# Patient Record
Sex: FEMALE | Race: BLACK OR AFRICAN AMERICAN | Employment: FULL TIME | URBAN - METROPOLITAN AREA
[De-identification: names, ages, dates, MRNs, and addresses within clinical notes are randomized per-mention and may not be internally consistent; named-entity substitution may affect disease eponyms.]

---

## 2017-06-15 ENCOUNTER — OFFICE VISIT (OUTPATIENT)
Dept: OBGYN CLINIC | Facility: CLINIC | Age: 27
End: 2017-06-15

## 2017-06-15 VITALS
BODY MASS INDEX: 42.65 KG/M2 | WEIGHT: 249.81 LBS | DIASTOLIC BLOOD PRESSURE: 69 MMHG | HEART RATE: 79 BPM | SYSTOLIC BLOOD PRESSURE: 104 MMHG | HEIGHT: 64 IN

## 2017-06-15 DIAGNOSIS — Z01.419 ENCOUNTER FOR GYNECOLOGICAL EXAMINATION WITHOUT ABNORMAL FINDING: Primary | ICD-10-CM

## 2017-06-15 DIAGNOSIS — N89.8 VAGINAL ITCHING: ICD-10-CM

## 2017-06-15 PROCEDURE — 99395 PREV VISIT EST AGE 18-39: CPT | Performed by: OBSTETRICS & GYNECOLOGY

## 2017-06-15 PROCEDURE — 99212 OFFICE O/P EST SF 10 MIN: CPT | Performed by: OBSTETRICS & GYNECOLOGY

## 2017-06-15 NOTE — PROGRESS NOTES
David Haas is a 32year old female  Patient's last menstrual period was 2017 (exact date).   Patient presents with:  Gyn Exam: annual exam   Gyn Problem: vaginal irritation & vaginal itching x7 days    patient is here today for her annual e Comment 2007  at [address], [address] [facility] [address]; W/BIOPSY(S), CERVIX      Comment colpo - 2013         SOCIAL HISTORY:    Social History   Marital Status: Single  Spouse Name: N/A    Years of Education: N/A  Number of Child discharge, nipple retraction or skin changes  Respiratory:  lungs clear to auscultation bilaterally  Cardiovascular: regular rate and rhythm, no significant murmur  Abdomen:  soft, nontender, nondistended, no masses  Skin/Hair: no unusual rashes or bruises

## 2017-07-12 ENCOUNTER — OFFICE VISIT (OUTPATIENT)
Dept: INTERNAL MEDICINE CLINIC | Facility: CLINIC | Age: 27
End: 2017-07-12

## 2017-07-12 VITALS
RESPIRATION RATE: 17 BRPM | OXYGEN SATURATION: 98 % | TEMPERATURE: 98 F | DIASTOLIC BLOOD PRESSURE: 60 MMHG | HEIGHT: 64 IN | SYSTOLIC BLOOD PRESSURE: 120 MMHG | HEART RATE: 68 BPM | WEIGHT: 148 LBS | BODY MASS INDEX: 25.27 KG/M2

## 2017-07-12 DIAGNOSIS — B34.9 VIREMIA: Primary | ICD-10-CM

## 2017-07-12 DIAGNOSIS — G47.33 OSA (OBSTRUCTIVE SLEEP APNEA): ICD-10-CM

## 2017-07-12 DIAGNOSIS — J06.9 URI, ACUTE: ICD-10-CM

## 2017-07-12 PROBLEM — R60.0 EDEMA OF LOWER EXTREMITY: Status: ACTIVE | Noted: 2017-07-12

## 2017-07-12 PROBLEM — E66.01 MORBID OBESITY (HCC): Status: ACTIVE | Noted: 2017-07-12

## 2017-07-12 PROBLEM — R42 DIZZINESS: Status: ACTIVE | Noted: 2017-07-12

## 2017-07-12 PROCEDURE — 99213 OFFICE O/P EST LOW 20 MIN: CPT | Performed by: INTERNAL MEDICINE

## 2017-07-12 NOTE — PROGRESS NOTES
HPI:    Patient ID: Rey Rayo is a 32year old female. HPI     Patient  Got sick after taking care of son who has Flu like symptoms. She started to have earache, nasal congestion, body ache, poor appetite, nausea.  She had forced herself to go to wo Lymphadenopathy:     She has no cervical adenopathy. Neurological: She is alert and oriented to person, place, and time. Psychiatric: Judgment normal.   Nursing note and vitals reviewed.              ASSESSMENT/PLAN:   Viremia  (primary encounter diag

## 2017-08-16 ENCOUNTER — OFFICE VISIT (OUTPATIENT)
Dept: INTERNAL MEDICINE CLINIC | Facility: CLINIC | Age: 27
End: 2017-08-16

## 2017-08-16 VITALS
OXYGEN SATURATION: 98 % | BODY MASS INDEX: 42.34 KG/M2 | HEART RATE: 90 BPM | TEMPERATURE: 98 F | WEIGHT: 248 LBS | SYSTOLIC BLOOD PRESSURE: 126 MMHG | DIASTOLIC BLOOD PRESSURE: 62 MMHG | RESPIRATION RATE: 16 BRPM | HEIGHT: 64 IN

## 2017-08-16 DIAGNOSIS — G44.209 TENSION HEADACHE: Primary | ICD-10-CM

## 2017-08-16 PROCEDURE — 99213 OFFICE O/P EST LOW 20 MIN: CPT | Performed by: INTERNAL MEDICINE

## 2017-08-16 NOTE — PROGRESS NOTES
HPI:    Patient ID: Rey Rayo is a 32year old female. HPI     Patient has  tension headache and \" migraine'. BP had been normal.She had irregular work schedule. Worked as .  Sleep had been disturbed due to frequent change of work

## 2017-08-17 PROBLEM — R60.0 EDEMA OF LOWER EXTREMITY: Status: RESOLVED | Noted: 2017-07-12 | Resolved: 2017-08-17

## 2017-09-20 ENCOUNTER — OFFICE VISIT (OUTPATIENT)
Dept: OBGYN CLINIC | Facility: CLINIC | Age: 27
End: 2017-09-20

## 2017-09-20 ENCOUNTER — APPOINTMENT (OUTPATIENT)
Dept: LAB | Facility: HOSPITAL | Age: 27
End: 2017-09-20
Attending: OBSTETRICS & GYNECOLOGY
Payer: COMMERCIAL

## 2017-09-20 VITALS
BODY MASS INDEX: 42 KG/M2 | HEART RATE: 88 BPM | SYSTOLIC BLOOD PRESSURE: 119 MMHG | WEIGHT: 243 LBS | DIASTOLIC BLOOD PRESSURE: 76 MMHG

## 2017-09-20 DIAGNOSIS — N76.6 VULVAR ULCERATION: ICD-10-CM

## 2017-09-20 DIAGNOSIS — N76.6 VULVAR ULCERATION: Primary | ICD-10-CM

## 2017-09-20 PROCEDURE — 99213 OFFICE O/P EST LOW 20 MIN: CPT | Performed by: OBSTETRICS & GYNECOLOGY

## 2017-09-20 PROCEDURE — 87389 HIV-1 AG W/HIV-1&-2 AB AG IA: CPT

## 2017-09-20 PROCEDURE — 86780 TREPONEMA PALLIDUM: CPT

## 2017-09-20 PROCEDURE — 36415 COLL VENOUS BLD VENIPUNCTURE: CPT

## 2017-09-20 RX ORDER — ACYCLOVIR 50 MG/G
1 OINTMENT TOPICAL
Qty: 1 TUBE | Refills: 1 | Status: SHIPPED | OUTPATIENT
Start: 2017-09-20 | End: 2017-10-04 | Stop reason: ALTCHOICE

## 2017-09-20 NOTE — PROGRESS NOTES
[address] [facility] 1990       Patient presents with:  Prenatal Care: vaginal irritation/burning  c/o 3 day h/o right inner vulvar irritation. Symptoms worsen when she urinates. She has only 1 partner and does not use condoms.       Past Medical H encouraged, pt informed that I am suspicious of herpes and counseled on treatment and abstinence with outbreaks

## 2017-09-21 ENCOUNTER — TELEPHONE (OUTPATIENT)
Dept: SURGERY | Facility: CLINIC | Age: 27
End: 2017-09-21

## 2017-09-21 LAB
CANDIDA SCREEN: NEGATIVE
GENITAL VAGINOSIS SCREEN: NEGATIVE
HIV1+2 AB SERPL QL IA: NONREACTIVE
TRICHOMONAS SCREEN: NEGATIVE

## 2017-09-21 NOTE — TELEPHONE ENCOUNTER
9/21/17 @ 5:10pm Spoke to pt. She is seeing PCP on 9/25/17 and will ask for 6 visits for Dr. Neo Lara at that time.

## 2017-09-22 ENCOUNTER — TELEPHONE (OUTPATIENT)
Dept: OBGYN CLINIC | Facility: CLINIC | Age: 27
End: 2017-09-22

## 2017-09-22 LAB
C TRACH DNA SPEC QL NAA+PROBE: NEGATIVE
HSV1 DNA SPEC QL NAA+PROBE: NEGATIVE
HSV2 DNA SPEC QL NAA+PROBE: POSITIVE
N GONORRHOEA DNA SPEC QL NAA+PROBE: NEGATIVE
T PALLIDUM AB SER QL: NEGATIVE
T VAGINALIS RRNA SPEC QL NAA+PROBE: NEGATIVE

## 2017-09-22 NOTE — TELEPHONE ENCOUNTER
Pt informed that HIV and vaginal culture were negative/normal. Pt informed that all other labs are still in process and Lor Maria from lab stated that they should be resulted by later this afternoon.  Pt informed she can call back later or tomorrow morning for

## 2017-09-23 ENCOUNTER — TELEPHONE (OUTPATIENT)
Dept: OBGYN CLINIC | Facility: CLINIC | Age: 27
End: 2017-09-23

## 2017-09-23 NOTE — TELEPHONE ENCOUNTER
Pt advised HSV is positive. Pt was counseled by CAP at [facility]. Discussed condom use and to avoid intercourse with outbreaks. Partner to consult his doctor for advice, should be seen if experiences outbreak.  Pt has been using acyclovir and states it is helpin

## 2017-09-25 RX ORDER — VALACYCLOVIR HYDROCHLORIDE 500 MG/1
500 TABLET, FILM COATED ORAL 2 TIMES DAILY
Qty: 6 TABLET | Refills: 0 | Status: SHIPPED | OUTPATIENT
Start: 2017-09-25 | End: 2017-10-04 | Stop reason: ALTCHOICE

## 2017-09-25 NOTE — TELEPHONE ENCOUNTER
Please also send erx for valtrex 500mg po bid x 3 days as well as her using the acyclovir oint that she already has

## 2017-09-25 NOTE — TELEPHONE ENCOUNTER
Pt informed of CAPs recs and verbalized understanding. Valtrex 500mg BID x 3 days sent to pts pharmacy. Pt instructed to call for further outbreaks as well. Pt verbalized understanding.

## 2017-10-04 ENCOUNTER — OFFICE VISIT (OUTPATIENT)
Dept: INTERNAL MEDICINE CLINIC | Facility: CLINIC | Age: 27
End: 2017-10-04

## 2017-10-04 VITALS
TEMPERATURE: 98 F | HEART RATE: 82 BPM | OXYGEN SATURATION: 99 % | SYSTOLIC BLOOD PRESSURE: 118 MMHG | WEIGHT: 246 LBS | BODY MASS INDEX: 42 KG/M2 | DIASTOLIC BLOOD PRESSURE: 62 MMHG | RESPIRATION RATE: 17 BRPM | HEIGHT: 64 IN

## 2017-10-04 DIAGNOSIS — E66.01 MORBID OBESITY (HCC): ICD-10-CM

## 2017-10-04 DIAGNOSIS — Z86.19 H/O HERPES GENITALIS: ICD-10-CM

## 2017-10-04 DIAGNOSIS — G47.33 OSA (OBSTRUCTIVE SLEEP APNEA): ICD-10-CM

## 2017-10-04 DIAGNOSIS — G43.C0 PERIODIC HEADACHE SYNDROME, NOT INTRACTABLE: Primary | ICD-10-CM

## 2017-10-04 PROCEDURE — 99214 OFFICE O/P EST MOD 30 MIN: CPT | Performed by: INTERNAL MEDICINE

## 2017-10-04 RX ORDER — VALACYCLOVIR HYDROCHLORIDE 500 MG/1
500 TABLET, FILM COATED ORAL 2 TIMES DAILY
Qty: 6 TABLET | Refills: 0 | Status: SHIPPED | OUTPATIENT
Start: 2017-10-04 | End: 2017-10-26 | Stop reason: ALTCHOICE

## 2017-10-04 NOTE — PROGRESS NOTES
HPI:    Patient ID: Paula Crespo is a 32year old female. HPI  Patient is here for update of her FMLA form. H/o Migraine and tension headaches. Unable to concentrate and stay focus during exacerbation. Works as .  We have requested e Nursing note and vitals reviewed.              ASSESSMENT/PLAN:   Periodic headache syndrome, not intractable  (primary encounter diagnosis)  Morbid obesity (hcc)  H/o herpes genitalis  Isiah (obstructive sleep apnea)      Periodic headache due to migraine,

## 2017-10-05 ENCOUNTER — LAB ENCOUNTER (OUTPATIENT)
Dept: LAB | Facility: HOSPITAL | Age: 27
End: 2017-10-05
Attending: INTERNAL MEDICINE
Payer: COMMERCIAL

## 2017-10-05 DIAGNOSIS — E66.01 MORBID OBESITY (HCC): ICD-10-CM

## 2017-10-05 PROCEDURE — 82607 VITAMIN B-12: CPT

## 2017-10-05 PROCEDURE — 80061 LIPID PANEL: CPT

## 2017-10-05 PROCEDURE — 84443 ASSAY THYROID STIM HORMONE: CPT

## 2017-10-05 PROCEDURE — 36415 COLL VENOUS BLD VENIPUNCTURE: CPT

## 2017-10-05 PROCEDURE — 80053 COMPREHEN METABOLIC PANEL: CPT

## 2017-10-05 PROCEDURE — 85025 COMPLETE CBC W/AUTO DIFF WBC: CPT

## 2017-10-26 ENCOUNTER — OFFICE VISIT (OUTPATIENT)
Dept: SURGERY | Facility: CLINIC | Age: 27
End: 2017-10-26

## 2017-10-26 ENCOUNTER — LAB ENCOUNTER (OUTPATIENT)
Dept: LAB | Facility: HOSPITAL | Age: 27
End: 2017-10-26
Attending: NURSE PRACTITIONER
Payer: COMMERCIAL

## 2017-10-26 VITALS
BODY MASS INDEX: 41.16 KG/M2 | SYSTOLIC BLOOD PRESSURE: 132 MMHG | DIASTOLIC BLOOD PRESSURE: 77 MMHG | HEIGHT: 64 IN | WEIGHT: 241.13 LBS | OXYGEN SATURATION: 100 % | TEMPERATURE: 97 F | HEART RATE: 92 BPM | RESPIRATION RATE: 17 BRPM

## 2017-10-26 DIAGNOSIS — E66.01 MORBID OBESITY WITH BMI OF 40.0-44.9, ADULT (HCC): ICD-10-CM

## 2017-10-26 DIAGNOSIS — R60.0 LOWER EXTREMITY EDEMA: ICD-10-CM

## 2017-10-26 DIAGNOSIS — G47.33 OSA ON CPAP: Primary | ICD-10-CM

## 2017-10-26 DIAGNOSIS — Z99.89 OSA ON CPAP: Primary | ICD-10-CM

## 2017-10-26 DIAGNOSIS — M17.12 PRIMARY OSTEOARTHRITIS OF LEFT KNEE: ICD-10-CM

## 2017-10-26 DIAGNOSIS — R06.83 SNORING: ICD-10-CM

## 2017-10-26 PROCEDURE — 93010 ELECTROCARDIOGRAM REPORT: CPT | Performed by: INTERNAL MEDICINE

## 2017-10-26 PROCEDURE — 93005 ELECTROCARDIOGRAM TRACING: CPT

## 2017-10-26 PROCEDURE — 99214 OFFICE O/P EST MOD 30 MIN: CPT | Performed by: INTERNAL MEDICINE

## 2017-10-26 RX ORDER — PHENTERMINE HYDROCHLORIDE 15 MG/1
15 CAPSULE ORAL EVERY MORNING
Qty: 30 CAPSULE | Refills: 0 | Status: SHIPPED | OUTPATIENT
Start: 2017-10-26 | End: 2017-11-16

## 2017-10-26 RX ORDER — HYDROCHLOROTHIAZIDE 12.5 MG/1
12.5 CAPSULE, GELATIN COATED ORAL DAILY
Qty: 30 CAPSULE | Refills: 1 | Status: SHIPPED | OUTPATIENT
Start: 2017-10-26 | End: 2017-12-13

## 2017-10-26 NOTE — PROGRESS NOTES
The [facility] Consultation Note       Date of Consult:  10/26/2017    Patient:  Tricia Lopez  :      1990  MRN:      [id number]    Referring Provider: Dr. Cynthia Gardner       Chief Complaint:  Patient presents with:  Consult: pre surgica ev Number of children: N/A     Occupational History  None on file     Social History Main Topics   Smoking status: Never Smoker    Smokeless tobacco: Never Used    Alcohol use Yes  0.0 oz/week     Comment: SOCIALLY    Drug use: No    Sexual activity: Yes 30 minutes to complete each meal      ROS:    Review of Systems   Constitutional: Negative. Negative for activity change, appetite change and fatigue. HENT: Negative. Respiratory: Negative. Negative for cough, shortness of breath and wheezing.     Ca beverages per day. No fruit juices or regular soda. 3. Increase activity-upper body exercises, walk 10 minutes per day. 4. Increase fruit and vegetable servings to 5-6 per day.       OBSTRUCTIVE SLEEP APNEA: The patient states her sleep apnea has been sta

## 2017-10-27 ENCOUNTER — TELEPHONE (OUTPATIENT)
Dept: SURGERY | Facility: CLINIC | Age: 27
End: 2017-10-27

## 2017-10-27 NOTE — TELEPHONE ENCOUNTER
Per the request of KELLI Jackson, I called patient to review HMO bariatric insurance benefits as she is interested in Bariatric Surgery.  Per consent on file, I left detailed message letting her know she did need to meet the 6 month weight management

## 2017-11-08 ENCOUNTER — OFFICE VISIT (OUTPATIENT)
Dept: INTERNAL MEDICINE CLINIC | Facility: CLINIC | Age: 27
End: 2017-11-08

## 2017-11-08 VITALS
HEIGHT: 64 IN | OXYGEN SATURATION: 99 % | HEART RATE: 98 BPM | WEIGHT: 239 LBS | BODY MASS INDEX: 40.8 KG/M2 | SYSTOLIC BLOOD PRESSURE: 118 MMHG | TEMPERATURE: 98 F | DIASTOLIC BLOOD PRESSURE: 70 MMHG | RESPIRATION RATE: 17 BRPM

## 2017-11-08 DIAGNOSIS — E66.01 MORBID OBESITY (HCC): ICD-10-CM

## 2017-11-08 DIAGNOSIS — Z99.89 OSA ON CPAP: ICD-10-CM

## 2017-11-08 DIAGNOSIS — G44.209 TENSION HEADACHE: Primary | ICD-10-CM

## 2017-11-08 DIAGNOSIS — G47.33 OSA ON CPAP: ICD-10-CM

## 2017-11-08 PROCEDURE — 99213 OFFICE O/P EST LOW 20 MIN: CPT | Performed by: INTERNAL MEDICINE

## 2017-11-10 NOTE — PROGRESS NOTES
HPI:    Patient ID: Fredis Beverly is a 32year old female. 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 Due to irregular work schedule, she gets exacerbation of tension headache due to irregular sleep pattern  .  Requesting excuse from tardiness mass. There is no tenderness. Musculoskeletal: She exhibits no edema. Neurological: She is alert and oriented to person, place, and time. She has normal reflexes. No cranial nerve deficit. Coordination normal.   Skin: Skin is warm and dry.    Psychiatri

## 2017-11-16 ENCOUNTER — OFFICE VISIT (OUTPATIENT)
Dept: SURGERY | Facility: CLINIC | Age: 27
End: 2017-11-16

## 2017-11-16 ENCOUNTER — TELEPHONE (OUTPATIENT)
Dept: INTERNAL MEDICINE CLINIC | Facility: CLINIC | Age: 27
End: 2017-11-16

## 2017-11-16 VITALS
RESPIRATION RATE: 18 BRPM | HEART RATE: 89 BPM | OXYGEN SATURATION: 99 % | TEMPERATURE: 97 F | SYSTOLIC BLOOD PRESSURE: 121 MMHG | BODY MASS INDEX: 41.21 KG/M2 | DIASTOLIC BLOOD PRESSURE: 75 MMHG | WEIGHT: 241.38 LBS | HEIGHT: 64 IN

## 2017-11-16 DIAGNOSIS — Z99.89 OSA ON CPAP: Primary | ICD-10-CM

## 2017-11-16 DIAGNOSIS — G47.33 OSA ON CPAP: Primary | ICD-10-CM

## 2017-11-16 DIAGNOSIS — R60.0 LOWER EXTREMITY EDEMA: ICD-10-CM

## 2017-11-16 DIAGNOSIS — E66.01 MORBID OBESITY WITH BMI OF 40.0-44.9, ADULT (HCC): ICD-10-CM

## 2017-11-16 DIAGNOSIS — Z51.81 ENCOUNTER FOR THERAPEUTIC DRUG MONITORING: ICD-10-CM

## 2017-11-16 PROCEDURE — 99214 OFFICE O/P EST MOD 30 MIN: CPT | Performed by: INTERNAL MEDICINE

## 2017-11-16 RX ORDER — PHENTERMINE HYDROCHLORIDE 15 MG/1
15 CAPSULE ORAL EVERY MORNING
Qty: 30 CAPSULE | Refills: 0 | Status: SHIPPED | OUTPATIENT
Start: 2017-11-16 | End: 2017-12-13

## 2017-11-16 NOTE — PROGRESS NOTES
[address], [address] [address]  Dept: [phone]     Date:   2017    Patient:  David Haas  :      1990  MRN:      [id number]    Chief Complaint: Topics   Smoking status: Never Smoker    Smokeless tobacco: Never Used    Alcohol use Yes  0.0 oz/week     Comment: SOCIALLY    Drug use: No    Sexual activity: Yes    Partners: Male    Birth control/ protection: IUD, Paragard     Other Topics Concern    C Constitutional: Negative. Negative for activity change, appetite change, chills and fatigue. HENT: Negative. Respiratory: Negative. Negative for cough, shortness of breath and wheezing. Cardiovascular: Negative.   Negative for chest pain, palpit servings to 5-6 per day.       OBSTRUCTIVE SLEEP APNEA: The patient states her sleep apnea has been stable since the last clinic visit. There has not been any increase in hyper-somnolence.  Doesn't use CPAP every night     MALA: continue HCTZ for MALA    EKG

## 2017-11-27 ENCOUNTER — HOSPITAL ENCOUNTER (EMERGENCY)
Facility: HOSPITAL | Age: 27
Discharge: HOME | End: 2017-11-27
Attending: EMERGENCY MEDICINE
Payer: COMMERCIAL

## 2017-11-27 VITALS
TEMPERATURE: 99 F | OXYGEN SATURATION: 98 % | RESPIRATION RATE: 16 BRPM | SYSTOLIC BLOOD PRESSURE: 126 MMHG | HEART RATE: 85 BPM | DIASTOLIC BLOOD PRESSURE: 73 MMHG

## 2017-11-27 DIAGNOSIS — S41.112A ARM LACERATION, LEFT, INITIAL ENCOUNTER: Primary | ICD-10-CM

## 2017-11-27 PROCEDURE — 12001 RPR S/N/AX/GEN/TRNK 2.5CM/<: CPT

## 2017-11-27 PROCEDURE — 99283 EMERGENCY DEPT VISIT LOW MDM: CPT
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
## 2017-11-30 DIAGNOSIS — E66.01 MORBID OBESITY (HCC): Primary | ICD-10-CM

## 2017-12-06 ENCOUNTER — OFFICE VISIT (OUTPATIENT)
Dept: INTERNAL MEDICINE CLINIC | Facility: CLINIC | Age: 27
End: 2017-12-06

## 2017-12-06 VITALS
RESPIRATION RATE: 17 BRPM | WEIGHT: 242 LBS | BODY MASS INDEX: 41.32 KG/M2 | OXYGEN SATURATION: 99 % | HEIGHT: 64 IN | TEMPERATURE: 98 F | SYSTOLIC BLOOD PRESSURE: 130 MMHG | DIASTOLIC BLOOD PRESSURE: 78 MMHG | HEART RATE: 76 BPM

## 2017-12-06 DIAGNOSIS — T14.8XXA STAB WOUND: Primary | ICD-10-CM

## 2017-12-06 PROCEDURE — 99213 OFFICE O/P EST LOW 20 MIN: CPT | Performed by: INTERNAL MEDICINE
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
## 2017-12-13 ENCOUNTER — OFFICE VISIT (OUTPATIENT)
Dept: SURGERY | Facility: CLINIC | Age: 27
End: 2017-12-13

## 2017-12-13 VITALS
DIASTOLIC BLOOD PRESSURE: 72 MMHG | BODY MASS INDEX: 40.39 KG/M2 | OXYGEN SATURATION: 99 % | SYSTOLIC BLOOD PRESSURE: 113 MMHG | HEART RATE: 78 BPM | RESPIRATION RATE: 16 BRPM | WEIGHT: 236.56 LBS | HEIGHT: 64 IN

## 2017-12-13 DIAGNOSIS — R60.0 LOWER EXTREMITY EDEMA: ICD-10-CM

## 2017-12-13 DIAGNOSIS — E66.01 MORBID OBESITY WITH BMI OF 40.0-44.9, ADULT (HCC): ICD-10-CM

## 2017-12-13 DIAGNOSIS — M17.12 PRIMARY OSTEOARTHRITIS OF LEFT KNEE: ICD-10-CM

## 2017-12-13 DIAGNOSIS — Z99.89 OSA ON CPAP: Primary | ICD-10-CM

## 2017-12-13 DIAGNOSIS — Z51.81 ENCOUNTER FOR THERAPEUTIC DRUG MONITORING: ICD-10-CM

## 2017-12-13 DIAGNOSIS — G47.33 OSA ON CPAP: Primary | ICD-10-CM

## 2017-12-13 PROCEDURE — 99214 OFFICE O/P EST MOD 30 MIN: CPT | Performed by: INTERNAL MEDICINE

## 2017-12-13 RX ORDER — HYDROCHLOROTHIAZIDE 12.5 MG/1
12.5 CAPSULE, GELATIN COATED ORAL DAILY
Qty: 30 CAPSULE | Refills: 1 | Status: SHIPPED | OUTPATIENT
Start: 2017-12-13 | End: 2017-12-27

## 2017-12-13 RX ORDER — PHENTERMINE HYDROCHLORIDE 15 MG/1
15 CAPSULE ORAL EVERY MORNING
Qty: 30 CAPSULE | Refills: 1 | Status: SHIPPED | OUTPATIENT
Start: 2017-12-13 | End: 2018-01-10

## 2017-12-13 NOTE — PROGRESS NOTES
[address], [facility]  [address]  [facility] [address] [facility] [address]  Dept: [phone]     Date:   2017    Patient:  Shanika Mcghee  :      1990  MRN:      [id number]    Chief Complaint: Alcohol use Yes  0.0 oz/week     Comment: SOCIALLY    Drug use: No    Sexual activity: Yes    Partners: Male    Birth control/ protection: IUD, Paragard     Other Topics Concern    Caffeine Concern No    Exercise Yes    Special Diet No    Weight Concern Bird Wheeler Negative. Respiratory: Negative. Negative for cough, shortness of breath and wheezing. Cardiovascular: Negative. Negative for chest pain, palpitations and leg swelling. Gastrointestinal: Negative.   Negative for abdominal distention, abdominal patrice states her sleep apnea has been stable since the last clinic visit. There has not been any increase in hyper-somnolence.  Doesn't use CPAP every night     MALA: continue HCTZ for MALA    EKG done        PHENTERMINE: tolerating well  Will refill at 15 mg     R

## 2017-12-26 ENCOUNTER — TELEPHONE (OUTPATIENT)
Dept: SURGERY | Facility: CLINIC | Age: 27
End: 2017-12-26

## 2017-12-26 NOTE — TELEPHONE ENCOUNTER
Patient would like Hydrochlorothiazide sent to [facility] pharmacy.     [facility] will no longer fill prescriptin

## 2017-12-27 ENCOUNTER — TELEPHONE (OUTPATIENT)
Dept: SURGERY | Facility: CLINIC | Age: 27
End: 2017-12-27

## 2018-01-08 ENCOUNTER — TELEPHONE (OUTPATIENT)
Dept: OBGYN CLINIC | Facility: CLINIC | Age: 28
End: 2018-01-08

## 2018-01-08 NOTE — TELEPHONE ENCOUNTER
Lmtcb. Please assist pt in scheduling with CAP. If she only wants to make an appt this does not need to be sent to triage nurse, thanks.

## 2018-01-10 ENCOUNTER — OFFICE VISIT (OUTPATIENT)
Dept: SURGERY | Facility: CLINIC | Age: 28
End: 2018-01-10

## 2018-01-10 VITALS
BODY MASS INDEX: 40.15 KG/M2 | OXYGEN SATURATION: 98 % | HEIGHT: 64 IN | HEART RATE: 79 BPM | RESPIRATION RATE: 16 BRPM | DIASTOLIC BLOOD PRESSURE: 78 MMHG | SYSTOLIC BLOOD PRESSURE: 120 MMHG | WEIGHT: 235.19 LBS

## 2018-01-10 DIAGNOSIS — R60.0 LOWER EXTREMITY EDEMA: ICD-10-CM

## 2018-01-10 DIAGNOSIS — Z51.81 ENCOUNTER FOR THERAPEUTIC DRUG MONITORING: ICD-10-CM

## 2018-01-10 DIAGNOSIS — G47.33 OSA ON CPAP: Primary | ICD-10-CM

## 2018-01-10 DIAGNOSIS — Z99.89 OSA ON CPAP: Primary | ICD-10-CM

## 2018-01-10 DIAGNOSIS — M17.12 PRIMARY OSTEOARTHRITIS OF LEFT KNEE: ICD-10-CM

## 2018-01-10 DIAGNOSIS — E66.01 MORBID OBESITY WITH BMI OF 40.0-44.9, ADULT (HCC): ICD-10-CM

## 2018-01-10 PROCEDURE — 99214 OFFICE O/P EST MOD 30 MIN: CPT | Performed by: INTERNAL MEDICINE

## 2018-01-10 RX ORDER — PHENTERMINE HYDROCHLORIDE 15 MG/1
15 CAPSULE ORAL EVERY MORNING
Qty: 30 CAPSULE | Refills: 1 | Status: SHIPPED | OUTPATIENT
Start: 2018-01-10 | End: 2018-02-15

## 2018-01-10 NOTE — PROGRESS NOTES
[address] [place] [address]  Dept: [phone]     Date:   2017    Patient:  Chaz Garcia  :      1990  MRN:      [id number]    Chief Complaint: Alcohol use Yes  0.0 oz/week     Comment: SOCIALLY    Drug use: No    Sexual activity: Yes    Partners: Male    Birth control/ protection: IUD, Paragard     Other Topics Concern    Caffeine Concern No    Exercise Yes    Special Diet No    Weight Concern Amandeep Camarena Negative. Respiratory: Negative. Negative for cough, shortness of breath and wheezing. Cardiovascular: Negative. Negative for chest pain, palpitations and leg swelling. Gastrointestinal: Negative.   Negative for abdominal distention, abdominal patrice states her sleep apnea has been stable since the last clinic visit. There has not been any increase in hyper-somnolence.  Doesn't use CPAP every night     MALA: continue HCTZ for MALA    EKG done        PHENTERMINE: tolerating well  Will refill at 15 mg     R

## 2018-01-31 ENCOUNTER — TELEPHONE (OUTPATIENT)
Dept: SURGERY | Facility: CLINIC | Age: 28
End: 2018-01-31

## 2018-01-31 ENCOUNTER — TELEPHONE (OUTPATIENT)
Dept: INTERNAL MEDICINE CLINIC | Facility: CLINIC | Age: 28
End: 2018-01-31

## 2018-01-31 DIAGNOSIS — E66.09 CLASS 2 OBESITY DUE TO EXCESS CALORIES WITHOUT SERIOUS COMORBIDITY WITH BODY MASS INDEX (BMI) OF 39.0 TO 39.9 IN ADULT: Primary | ICD-10-CM

## 2018-02-15 ENCOUNTER — OFFICE VISIT (OUTPATIENT)
Dept: SURGERY | Facility: CLINIC | Age: 28
End: 2018-02-15

## 2018-02-15 VITALS
DIASTOLIC BLOOD PRESSURE: 74 MMHG | HEART RATE: 70 BPM | OXYGEN SATURATION: 97 % | TEMPERATURE: 98 F | RESPIRATION RATE: 18 BRPM | WEIGHT: 237.69 LBS | HEIGHT: 64 IN | BODY MASS INDEX: 40.58 KG/M2 | SYSTOLIC BLOOD PRESSURE: 123 MMHG

## 2018-02-15 DIAGNOSIS — Z51.81 ENCOUNTER FOR THERAPEUTIC DRUG MONITORING: ICD-10-CM

## 2018-02-15 DIAGNOSIS — G47.33 OSA ON CPAP: Primary | ICD-10-CM

## 2018-02-15 DIAGNOSIS — Z99.89 OSA ON CPAP: Primary | ICD-10-CM

## 2018-02-15 DIAGNOSIS — R06.83 SNORING: ICD-10-CM

## 2018-02-15 DIAGNOSIS — R60.0 LOWER EXTREMITY EDEMA: ICD-10-CM

## 2018-02-15 DIAGNOSIS — E66.01 MORBID OBESITY WITH BMI OF 40.0-44.9, ADULT (HCC): ICD-10-CM

## 2018-02-15 PROCEDURE — 99214 OFFICE O/P EST MOD 30 MIN: CPT | Performed by: INTERNAL MEDICINE

## 2018-02-15 RX ORDER — PHENTERMINE HYDROCHLORIDE 15 MG/1
15 CAPSULE ORAL EVERY MORNING
Qty: 30 CAPSULE | Refills: 1 | Status: SHIPPED | OUTPATIENT
Start: 2018-02-15 | End: 2018-03-14

## 2018-02-15 NOTE — PROGRESS NOTES
[address], [facility]  [address] [facility] [address] [facility] [address]  Dept: [phone]     Date:   2017    Patient:  Micaela Rajput  :      1990  MRN:      [id number]    Chief Complaint: History Main Topics   Smoking status: Never Smoker    Smokeless tobacco: Never Used    Alcohol use Yes  0.0 oz/week     Comment: SOCIALLY    Drug use: No    Sexual activity: Yes    Partners: Male    Birth control/ protection: IUD, Paragard     Other Topics Negative. Negative for activity change, appetite change, chills and fatigue. HENT: Negative. Respiratory: Negative. Negative for cough, shortness of breath and wheezing. Cardiovascular: Negative.   Negative for chest pain, palpitations and leg swe 5-6 per day.       OBSTRUCTIVE SLEEP APNEA: The patient states her sleep apnea has been stable since the last clinic visit. There has not been any increase in hyper-somnolence.  Doesn't use CPAP every night     MALA: continue HCTZ for MALA    EKG done

## 2018-02-16 ENCOUNTER — APPOINTMENT (OUTPATIENT)
Dept: GENERAL RADIOLOGY | Age: 28
End: 2018-02-16
Attending: FAMILY MEDICINE
Payer: COMMERCIAL

## 2018-02-16 ENCOUNTER — HOSPITAL ENCOUNTER (OUTPATIENT)
Age: 28
Discharge: HOME | End: 2018-02-16
Attending: FAMILY MEDICINE
Payer: COMMERCIAL

## 2018-02-16 VITALS
HEIGHT: 64 IN | RESPIRATION RATE: 18 BRPM | BODY MASS INDEX: 41.32 KG/M2 | DIASTOLIC BLOOD PRESSURE: 75 MMHG | OXYGEN SATURATION: 100 % | TEMPERATURE: 98 F | HEART RATE: 85 BPM | WEIGHT: 242 LBS | SYSTOLIC BLOOD PRESSURE: 125 MMHG

## 2018-02-16 DIAGNOSIS — S46.912A STRAIN OF LEFT SHOULDER, INITIAL ENCOUNTER: Primary | ICD-10-CM

## 2018-02-16 PROCEDURE — 99213 OFFICE O/P EST LOW 20 MIN: CPT

## 2018-02-16 PROCEDURE — 99214 OFFICE O/P EST MOD 30 MIN: CPT

## 2018-02-16 PROCEDURE — 73030 X-RAY EXAM OF SHOULDER: CPT | Performed by: FAMILY MEDICINE

## 2018-02-16 RX ORDER — IBUPROFEN 800 MG/1
800 TABLET ORAL EVERY 8 HOURS PRN
Qty: 30 TABLET | Refills: 0 | Status: SHIPPED | OUTPATIENT
Start: 2018-02-16 | End: 2018-02-23

## 2018-02-16 RX ORDER — CYCLOBENZAPRINE HCL 5 MG
5 TABLET ORAL 3 TIMES DAILY PRN
Qty: 15 TABLET | Refills: 0 | Status: SHIPPED | OUTPATIENT
Start: 2018-02-16 | End: 2018-03-15 | Stop reason: ALTCHOICE
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
## 2018-02-16 NOTE — ED PROVIDER NOTES
Patient Seen in: [address]    History   Patient presents with:  Musculoskeletal Problem    Stated Complaint: left arm injury    HPI  25-year-old female with a past medical history significant for obesity with hypertensi 18  Temp: 98.2 °F (36.8 °C)  Temp src: Oral  SpO2: 100 %  O2 Device: None (Room air)    Current:/75   Pulse 85   Temp 98.2 °F (36.8 °C) (Oral)   Resp 18   Ht 162.6 cm (5' 4\")   Wt 109.8 kg   LMP 02/08/2018   SpO2 100%   BMI 41.54 kg/m²         Physi swelling.          Dictated by (CST): Petra Camacho MD on 2/16/2018 at 15:39       Approved by (CST): Petra Camacho MD on 2/16/2018 at 15:39               X-rays are negative for any acute fractures or dislocations.   Will treat conservatively with

## 2018-02-21 ENCOUNTER — OFFICE VISIT (OUTPATIENT)
Dept: INTERNAL MEDICINE CLINIC | Facility: CLINIC | Age: 28
End: 2018-02-21

## 2018-02-21 VITALS
HEART RATE: 93 BPM | TEMPERATURE: 98 F | DIASTOLIC BLOOD PRESSURE: 60 MMHG | BODY MASS INDEX: 40.46 KG/M2 | HEIGHT: 64 IN | WEIGHT: 237 LBS | OXYGEN SATURATION: 99 % | SYSTOLIC BLOOD PRESSURE: 132 MMHG | RESPIRATION RATE: 20 BRPM

## 2018-02-21 DIAGNOSIS — J06.9 VIRAL UPPER RESPIRATORY TRACT INFECTION: ICD-10-CM

## 2018-02-21 DIAGNOSIS — S46.912A MUSCLE STRAIN, SHOULDER REGION, LEFT, INITIAL ENCOUNTER: Primary | ICD-10-CM

## 2018-02-21 PROCEDURE — 99213 OFFICE O/P EST LOW 20 MIN: CPT | Performed by: INTERNAL MEDICINE
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
## 2018-02-26 ENCOUNTER — APPOINTMENT (OUTPATIENT)
Dept: PHYSICAL THERAPY | Facility: HOSPITAL | Age: 28
End: 2018-02-26
Attending: INTERNAL MEDICINE
Payer: COMMERCIAL

## 2018-02-28 ENCOUNTER — TELEPHONE (OUTPATIENT)
Dept: INTERNAL MEDICINE CLINIC | Facility: CLINIC | Age: 28
End: 2018-02-28

## 2018-02-28 ENCOUNTER — OFFICE VISIT (OUTPATIENT)
Dept: SURGERY | Facility: CLINIC | Age: 28
End: 2018-02-28

## 2018-02-28 VITALS — HEIGHT: 64 IN | WEIGHT: 237.5 LBS | BODY MASS INDEX: 40.55 KG/M2

## 2018-02-28 DIAGNOSIS — E66.01 MORBID OBESITY WITH BMI OF 40.0-44.9, ADULT (HCC): Primary | ICD-10-CM

## 2018-02-28 PROCEDURE — 97803 MED NUTRITION INDIV SUBSEQ: CPT | Performed by: DIETITIAN, REGISTERED

## 2018-02-28 NOTE — PROGRESS NOTES
[address] [place] [address] AND WEIGHT LOSS CLINIC  [address] [place] [address]  Dept: [phone]  Loc: [phone]    02/28/18    Bariatric Initial Nutrition Assessment    Chela Souza is a 25year old female.     Referr support from: Family, mother is primary support system    Patient acknowledges binge eating behavior: Eats until uncomfortably full. and Eats large amounts of food when not physically hungry.     Patient engages in binge-purge behavior: no    Patient uses l Results  Component Value Date   B12 1,057 (H) 10/05/2017     No results found for: VITD, QVITD, VITD25, TDRZ34CU  No results found for: THIAMINE   No results found for: VITB1  No results found for: FOLIC    Relevant Meds:      Current Outpatient Prescripti protein sources; discussed sustainability of adopting lifestyle behavior change  vs. quick fix diet for susutainable weight loss. Provided 1200 david menu and discussed goal of preparing more foods at home.  Patient with little activity while not currently w quiz  Patient ready for Liquid Protein Education?  No    Shasta Mack, MS, RD, LDN  Face-to-face time spent with pt: 90 minutes

## 2018-02-28 NOTE — TELEPHONE ENCOUNTER
Pt needs a Work status report for her job. It should state:  Return with restrictions or total off from duties. / Pt made an appt for tomorrow.

## 2018-03-01 ENCOUNTER — OFFICE VISIT (OUTPATIENT)
Dept: INTERNAL MEDICINE CLINIC | Facility: CLINIC | Age: 28
End: 2018-03-01

## 2018-03-01 ENCOUNTER — OFFICE VISIT (OUTPATIENT)
Dept: PHYSICAL THERAPY | Facility: HOSPITAL | Age: 28
End: 2018-03-01
Attending: INTERNAL MEDICINE
Payer: COMMERCIAL

## 2018-03-01 VITALS
RESPIRATION RATE: 17 BRPM | OXYGEN SATURATION: 99 % | SYSTOLIC BLOOD PRESSURE: 110 MMHG | WEIGHT: 237 LBS | BODY MASS INDEX: 40.46 KG/M2 | TEMPERATURE: 98 F | HEART RATE: 110 BPM | DIASTOLIC BLOOD PRESSURE: 60 MMHG | HEIGHT: 64 IN

## 2018-03-01 DIAGNOSIS — S46.912A MUSCLE STRAIN, SHOULDER REGION, LEFT, INITIAL ENCOUNTER: ICD-10-CM

## 2018-03-01 DIAGNOSIS — S46.912S: Primary | ICD-10-CM

## 2018-03-01 DIAGNOSIS — L98.9 FACIAL SKIN LESION: ICD-10-CM

## 2018-03-01 PROCEDURE — 99213 OFFICE O/P EST LOW 20 MIN: CPT | Performed by: INTERNAL MEDICINE

## 2018-03-01 PROCEDURE — 97110 THERAPEUTIC EXERCISES: CPT

## 2018-03-01 PROCEDURE — 97161 PT EVAL LOW COMPLEX 20 MIN: CPT

## 2018-03-01 NOTE — PROGRESS NOTES
PT EVALUATION:   Referring Physician: Devan Pardo MD  Date of Onset:  2/16/2018 Date of Service: 3/1/2018   Diagnosis: L shoulder pain   SUBJECTIVE:   Ck Yanez is a 25year old y/o female who presents to therapy today with complaints of L should the above impairments to improve ROM and reduce pain in order to facilitate full return to work and achieve below set functional goals.      Precautions: None       OBJECTIVE:   Observation/Posture: Poor, slumped posture    Cervical AROM:  Pain (+/-)   Flex demonstrate ability to reach for an item off a shelf above eye level without pain. Frequency/Duration: Patient will be seen for 2x/week or a total of 8 visits over a 90 day period.  Treatment will include: Manual Therapy, Neuromuscular Re-education, The
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
## 2018-03-06 ENCOUNTER — OFFICE VISIT (OUTPATIENT)
Dept: PHYSICAL THERAPY | Facility: HOSPITAL | Age: 28
End: 2018-03-06
Attending: INTERNAL MEDICINE
Payer: COMMERCIAL

## 2018-03-06 DIAGNOSIS — S46.912A MUSCLE STRAIN, SHOULDER REGION, LEFT, INITIAL ENCOUNTER: ICD-10-CM

## 2018-03-06 PROCEDURE — 97140 MANUAL THERAPY 1/> REGIONS: CPT

## 2018-03-06 PROCEDURE — 97110 THERAPEUTIC EXERCISES: CPT

## 2018-03-06 NOTE — PROGRESS NOTES
Diagnosis: L shoulder pain  Authorized # of Visits:  2/8 [facility])         Next MD visit: 3/15/18  Fall Risk: standard         Precautions: n/a           Medication Changes since last visit?: No  Subjective: Pt states that her shoulder seems to be getting bett

## 2018-03-08 ENCOUNTER — OFFICE VISIT (OUTPATIENT)
Dept: PHYSICAL THERAPY | Facility: HOSPITAL | Age: 28
End: 2018-03-08
Attending: INTERNAL MEDICINE
Payer: COMMERCIAL

## 2018-03-08 DIAGNOSIS — S46.912A MUSCLE STRAIN, SHOULDER REGION, LEFT, INITIAL ENCOUNTER: ICD-10-CM

## 2018-03-08 PROCEDURE — 97110 THERAPEUTIC EXERCISES: CPT

## 2018-03-08 NOTE — PROGRESS NOTES
Diagnosis: L shoulder pain  Authorized # of Visits:  3/8 [facility])         Next MD visit: 3/15/18  Fall Risk: standard         Precautions: n/a           Medication Changes since last visit?: No  Subjective: Pt states that her shoulder is good and denies pain.

## 2018-03-13 ENCOUNTER — OFFICE VISIT (OUTPATIENT)
Dept: PHYSICAL THERAPY | Facility: HOSPITAL | Age: 28
End: 2018-03-13
Attending: INTERNAL MEDICINE
Payer: COMMERCIAL

## 2018-03-13 DIAGNOSIS — S46.912A MUSCLE STRAIN, SHOULDER REGION, LEFT, INITIAL ENCOUNTER: ICD-10-CM

## 2018-03-13 PROCEDURE — 97110 THERAPEUTIC EXERCISES: CPT

## 2018-03-13 NOTE — PROGRESS NOTES
Diagnosis: L shoulder pain  Authorized # of Visits:  4/8 [facility])         Next MD visit: 3/15/18  Fall Risk: standard         Precautions: n/a           Medication Changes since last visit?: No  Subjective: Pt states that her shoulder is feeling good and donna

## 2018-03-14 ENCOUNTER — OFFICE VISIT (OUTPATIENT)
Dept: SURGERY | Facility: CLINIC | Age: 28
End: 2018-03-14

## 2018-03-14 VITALS
BODY MASS INDEX: 39.95 KG/M2 | HEIGHT: 64 IN | HEART RATE: 92 BPM | SYSTOLIC BLOOD PRESSURE: 135 MMHG | OXYGEN SATURATION: 98 % | RESPIRATION RATE: 16 BRPM | DIASTOLIC BLOOD PRESSURE: 81 MMHG | WEIGHT: 234 LBS

## 2018-03-14 DIAGNOSIS — G47.33 OSA ON CPAP: Primary | ICD-10-CM

## 2018-03-14 DIAGNOSIS — M17.12 PRIMARY OSTEOARTHRITIS OF LEFT KNEE: ICD-10-CM

## 2018-03-14 DIAGNOSIS — Z51.81 ENCOUNTER FOR THERAPEUTIC DRUG MONITORING: ICD-10-CM

## 2018-03-14 DIAGNOSIS — Z99.89 OSA ON CPAP: Primary | ICD-10-CM

## 2018-03-14 DIAGNOSIS — R60.0 LOWER EXTREMITY EDEMA: ICD-10-CM

## 2018-03-14 DIAGNOSIS — E66.01 MORBID OBESITY WITH BMI OF 40.0-44.9, ADULT (HCC): ICD-10-CM

## 2018-03-14 PROCEDURE — 99214 OFFICE O/P EST MOD 30 MIN: CPT | Performed by: INTERNAL MEDICINE

## 2018-03-14 RX ORDER — HYDROCHLOROTHIAZIDE 12.5 MG/1
12.5 CAPSULE, GELATIN COATED ORAL DAILY
Qty: 30 CAPSULE | Refills: 1 | Status: SHIPPED | OUTPATIENT
Start: 2018-03-14 | End: 2018-03-14

## 2018-03-14 RX ORDER — HYDROCHLOROTHIAZIDE 12.5 MG/1
12.5 CAPSULE, GELATIN COATED ORAL DAILY
Qty: 90 CAPSULE | Refills: 0 | Status: SHIPPED | OUTPATIENT
Start: 2018-03-14 | End: 2018-03-26

## 2018-03-14 RX ORDER — PHENTERMINE HYDROCHLORIDE 15 MG/1
15 CAPSULE ORAL EVERY MORNING
Qty: 30 CAPSULE | Refills: 1 | Status: SHIPPED | OUTPATIENT
Start: 2018-03-14 | End: 2018-05-09

## 2018-03-14 NOTE — PROGRESS NOTES
[address], [facility]  [address] [facility] [address] [facility] [address]  Dept: [phone]     Date:   2017    Patient:  Tiago Moody  :      1990  MRN:      [id number]    Chief Complaint: History  None on file     Social History Main Topics   Smoking status: Never Smoker    Smokeless tobacco: Never Used    Alcohol use Yes  0.0 oz/week     Comment: SOCIALLY    Drug use: No    Sexual activity: Yes    Partners: Male    Birth control/ protectio Review of Systems   Constitutional: Negative. Negative for activity change, appetite change, chills and fatigue. HENT: Negative. Respiratory: Negative. Negative for cough, shortness of breath and wheezing. Cardiovascular: Negative.   Negative fo 10 minutes per day. 4. Increase fruit and vegetable servings to 5-6 per day.       OBSTRUCTIVE SLEEP APNEA: The patient states her sleep apnea has been stable since the last clinic visit. There has not been any increase in hyper-somnolence.  Doesn't use CP

## 2018-03-15 ENCOUNTER — OFFICE VISIT (OUTPATIENT)
Dept: PHYSICAL THERAPY | Facility: HOSPITAL | Age: 28
End: 2018-03-15
Attending: INTERNAL MEDICINE
Payer: COMMERCIAL

## 2018-03-15 ENCOUNTER — OFFICE VISIT (OUTPATIENT)
Dept: INTERNAL MEDICINE CLINIC | Facility: CLINIC | Age: 28
End: 2018-03-15

## 2018-03-15 VITALS
TEMPERATURE: 98 F | SYSTOLIC BLOOD PRESSURE: 120 MMHG | DIASTOLIC BLOOD PRESSURE: 60 MMHG | WEIGHT: 235.63 LBS | HEIGHT: 64 IN | RESPIRATION RATE: 17 BRPM | BODY MASS INDEX: 40.23 KG/M2 | HEART RATE: 70 BPM | OXYGEN SATURATION: 96 %

## 2018-03-15 DIAGNOSIS — E66.01 MORBID OBESITY (HCC): Primary | ICD-10-CM

## 2018-03-15 DIAGNOSIS — S46.912A MUSCLE STRAIN, SHOULDER REGION, LEFT, INITIAL ENCOUNTER: ICD-10-CM

## 2018-03-15 DIAGNOSIS — S46.912S: Primary | ICD-10-CM

## 2018-03-15 PROCEDURE — 97140 MANUAL THERAPY 1/> REGIONS: CPT

## 2018-03-15 PROCEDURE — 97110 THERAPEUTIC EXERCISES: CPT

## 2018-03-15 PROCEDURE — 99213 OFFICE O/P EST LOW 20 MIN: CPT | Performed by: INTERNAL MEDICINE

## 2018-03-15 NOTE — PROGRESS NOTES
HPI:    Patient ID: Fredis Beverly is a 25year old female. HPI  Patient is here for f/u of left  Shoulder muscular strain. She is feeling verall better. No longer have  pain. Able to abduct fully. No difficulty fastening her bra, or racking her back poc ASSESSMENT/PLAN:   Muscle strain, shoulder region, left, sequela  (primary encounter diagnosis)    Very much improved. No longer has pain with DROM. May return to work with full duty on 3/23/2018. Continue home exercise.

## 2018-03-15 NOTE — PROGRESS NOTES
Diagnosis: L shoulder pain  Authorized # of Visits:  5/8 [facility])         Next MD visit: 3/15/18  Fall Risk: standard         Precautions: n/a           Medication Changes since last visit?: No  Subjective: Pt states that she feels good and is not having any

## 2018-03-20 ENCOUNTER — OFFICE VISIT (OUTPATIENT)
Dept: PHYSICAL THERAPY | Facility: HOSPITAL | Age: 28
End: 2018-03-20
Attending: INTERNAL MEDICINE
Payer: COMMERCIAL

## 2018-03-20 DIAGNOSIS — S46.912A MUSCLE STRAIN, SHOULDER REGION, LEFT, INITIAL ENCOUNTER: ICD-10-CM

## 2018-03-20 PROCEDURE — 97110 THERAPEUTIC EXERCISES: CPT

## 2018-03-20 NOTE — PROGRESS NOTES
Diagnosis: L shoulder pain  Authorized # of Visits:  6/8 [facility])         Next MD visit: 3/15/18  Fall Risk: standard         Precautions: n/a           Medication Changes since last visit?: No  Subjective: Pt states that her shoulder feels good.   Pt states t for pain control. Charges:  TherEx 3      Total Timed Treatment: 45 min  Total Treatment Time: 48 min

## 2018-03-22 ENCOUNTER — APPOINTMENT (OUTPATIENT)
Dept: PHYSICAL THERAPY | Facility: HOSPITAL | Age: 28
End: 2018-03-22
Attending: INTERNAL MEDICINE
Payer: COMMERCIAL

## 2018-03-22 ENCOUNTER — TELEPHONE (OUTPATIENT)
Dept: PHYSICAL THERAPY | Facility: HOSPITAL | Age: 28
End: 2018-03-22

## 2018-03-22 NOTE — PROGRESS NOTES
Patient Name: Mami Cherry, : 1990, MRN: [id number]   Date:  3/22/2018  Referring Physician: Vane Fernandez    Diagnosis:  L shoulder pain    Discharge Summary    Pt has attended 6, cancelled 1, and no shown 0 visits in Physical Therapy.      Pr

## 2018-03-23 ENCOUNTER — TELEPHONE (OUTPATIENT)
Dept: INTERNAL MEDICINE CLINIC | Facility: CLINIC | Age: 28
End: 2018-03-23

## 2018-03-23 DIAGNOSIS — E66.01 MORBID OBESITY WITH BMI OF 40.0-44.9, ADULT (HCC): Primary | ICD-10-CM

## 2018-03-23 DIAGNOSIS — E66.01 MORBID OBESITY (HCC): Primary | ICD-10-CM

## 2018-03-23 NOTE — TELEPHONE ENCOUNTER
Pt called needs new referral to Dr. Luis [facility]).  Referral needs to have new Drs name

## 2018-03-26 DIAGNOSIS — R60.0 LOWER EXTREMITY EDEMA: ICD-10-CM

## 2018-03-26 RX ORDER — HYDROCHLOROTHIAZIDE 12.5 MG/1
CAPSULE, GELATIN COATED ORAL
Qty: 90 CAPSULE | Refills: 0 | Status: SHIPPED | OUTPATIENT
Start: 2018-03-26 | End: 2018-05-09

## 2018-03-28 ENCOUNTER — TELEPHONE (OUTPATIENT)
Dept: SURGERY | Facility: CLINIC | Age: 28
End: 2018-03-28

## 2018-03-28 ENCOUNTER — OFFICE VISIT (OUTPATIENT)
Dept: SURGERY | Facility: CLINIC | Age: 28
End: 2018-03-28

## 2018-03-28 VITALS — HEIGHT: 64 IN | WEIGHT: 234 LBS | BODY MASS INDEX: 39.95 KG/M2

## 2018-03-28 VITALS — HEIGHT: 64 IN | BODY MASS INDEX: 39.94 KG/M2 | WEIGHT: 233.94 LBS

## 2018-03-28 DIAGNOSIS — E66.01 MORBID OBESITY WITH BMI OF 40.0-44.9, ADULT (HCC): ICD-10-CM

## 2018-03-28 DIAGNOSIS — R60.0 LOWER EXTREMITY EDEMA: ICD-10-CM

## 2018-03-28 DIAGNOSIS — E55.9 VITAMIN D DEFICIENCY: ICD-10-CM

## 2018-03-28 DIAGNOSIS — G47.33 OSA ON CPAP: Primary | ICD-10-CM

## 2018-03-28 DIAGNOSIS — M17.12 PRIMARY OSTEOARTHRITIS OF LEFT KNEE: ICD-10-CM

## 2018-03-28 DIAGNOSIS — Z99.89 OSA ON CPAP: Primary | ICD-10-CM

## 2018-03-28 DIAGNOSIS — R06.83 SNORING: ICD-10-CM

## 2018-03-28 DIAGNOSIS — Z51.81 ENCOUNTER FOR THERAPEUTIC DRUG MONITORING: ICD-10-CM

## 2018-03-28 DIAGNOSIS — E66.01 MORBID OBESITY WITH BMI OF 40.0-44.9, ADULT (HCC): Primary | ICD-10-CM

## 2018-03-28 PROCEDURE — 0358T BIA WHOLE BODY: CPT | Performed by: DIETITIAN, REGISTERED

## 2018-03-28 PROCEDURE — 97803 MED NUTRITION INDIV SUBSEQ: CPT | Performed by: DIETITIAN, REGISTERED

## 2018-03-28 NOTE — TELEPHONE ENCOUNTER
Oriented patient to the bariatric program. Provided/reviewed bariatric information packet, referrals, timeline, etc. Pt with good understanding. Pt attended the seminar on November 13, 2017.

## 2018-03-28 NOTE — H&P
[address], [address] [address]  Dept: [phone]    3/28/2018  Bariatric New Patient Evaluation    Chief Complaint:  Morbid obesity     History of Present Illness: Number of children:               Social History Main Topics    Smoking status: Never Smoker                                                                Smokeless tobacco: Never Used                        Alcohol use: Yes           0.0 oz/week female with chronic morbid obesity who would benefit from significant and sustained weight loss    Plan: The patient appears to be an excellent candidate for bariatric surgery.   She has an HMO so we will request the patient be referred for routine pre-oper

## 2018-03-28 NOTE — PROGRESS NOTES
[address] [facility]  [address] [place] [address]  Dept: [phone]  Loc: [phone]    03/28/18      Bariatric Follow-up Nutrition Session    Salvador Martinez is a 25year old female.      [facility] Prescriptions:   •  HYDROCHLOROTHIAZIDE 12.5 MG Oral Cap, TAKE ONE CAPSULE BY MOUTH EVERY DAY, Disp: 90 capsule, Rfl: 0  •  Phentermine HCl 15 MG Oral Cap, Take 1 capsule (15 mg total) by mouth every morning., Disp: 30 capsule, Rfl: 1    Height: 1041-1565 for tolerable wt loss. Per food record, excess calorie intake from refined carbs from cereal and EtOH. Discussed ways to improve diet quality adding fresh f&v in addition to calorie reduction.  Would benefit with additional RD visit prior to [facility]

## 2018-03-29 ENCOUNTER — TELEPHONE (OUTPATIENT)
Dept: OBGYN CLINIC | Facility: CLINIC | Age: 28
End: 2018-03-29

## 2018-03-29 ENCOUNTER — OFFICE VISIT (OUTPATIENT)
Dept: INTERNAL MEDICINE CLINIC | Facility: CLINIC | Age: 28
End: 2018-03-29

## 2018-03-29 ENCOUNTER — OFFICE VISIT (OUTPATIENT)
Dept: OBGYN CLINIC | Facility: CLINIC | Age: 28
End: 2018-03-29

## 2018-03-29 VITALS
BODY MASS INDEX: 39.78 KG/M2 | OXYGEN SATURATION: 100 % | HEIGHT: 64 IN | TEMPERATURE: 98 F | DIASTOLIC BLOOD PRESSURE: 70 MMHG | SYSTOLIC BLOOD PRESSURE: 116 MMHG | RESPIRATION RATE: 19 BRPM | HEART RATE: 95 BPM | WEIGHT: 233 LBS

## 2018-03-29 VITALS
SYSTOLIC BLOOD PRESSURE: 114 MMHG | DIASTOLIC BLOOD PRESSURE: 78 MMHG | WEIGHT: 232 LBS | BODY MASS INDEX: 40 KG/M2 | HEART RATE: 88 BPM

## 2018-03-29 DIAGNOSIS — G44.209 TENSION HEADACHE: Primary | ICD-10-CM

## 2018-03-29 DIAGNOSIS — G47.33 OSA ON CPAP: ICD-10-CM

## 2018-03-29 DIAGNOSIS — L70.0 CLOSED FOLLICLE COMEDO: Primary | ICD-10-CM

## 2018-03-29 DIAGNOSIS — E66.01 MORBID OBESITY (HCC): ICD-10-CM

## 2018-03-29 DIAGNOSIS — Z99.89 OSA ON CPAP: ICD-10-CM

## 2018-03-29 PROCEDURE — 99213 OFFICE O/P EST LOW 20 MIN: CPT | Performed by: INTERNAL MEDICINE

## 2018-03-29 PROCEDURE — 99213 OFFICE O/P EST LOW 20 MIN: CPT | Performed by: OBSTETRICS & GYNECOLOGY

## 2018-03-29 NOTE — PROGRESS NOTES
HPI:    Patient ID: Shanika Mcghee is a 25year old female. HPI   Patient is here to update FMLA. History of frequent tension headache due to poor sleep pattern. Work schedule irregular. Mixed of day and night shift. She also has DANIA requiring CPAP.  Boone Garcia Musculoskeletal: She exhibits no edema. Lymphadenopathy:     She has no cervical adenopathy. Neurological: She is alert and oriented to person, place, and time. She has normal reflexes. Skin: No erythema.    Psychiatric: Judgment normal.   Vitals re

## 2018-03-30 NOTE — TELEPHONE ENCOUNTER
I think this was sent in error as the pt is not pregnant. The pt seen after her yesterday was a PN, and notes indicate she forgot to bring her log. Will f/u with that pt.

## 2018-04-09 NOTE — H&P
HPI:  The patient is a 1year-old female complaining of palpable labial bump for 1 month. Painful. No changes in size. No drainage. Patient has history of herpes and is concerned that she had a herpetic outbreak and wanted to be examined.         Revi HYDROCHLOROTHIAZIDE 12.5 MG Oral Cap, TAKE ONE CAPSULE BY MOUTH EVERY DAY, Disp: 90 capsule, Rfl: 0  •  Phentermine HCl 15 MG Oral Cap, Take 1 capsule (15 mg total) by mouth every morning., Disp: 30 capsule, Rfl: 1    ALLERGIES:    Betadine [Povidone *

## 2018-04-11 ENCOUNTER — TELEPHONE (OUTPATIENT)
Dept: SURGERY | Facility: CLINIC | Age: 28
End: 2018-04-11

## 2018-04-18 ENCOUNTER — APPOINTMENT (OUTPATIENT)
Dept: LAB | Facility: HOSPITAL | Age: 28
End: 2018-04-18
Attending: NURSE PRACTITIONER
Payer: COMMERCIAL

## 2018-04-18 DIAGNOSIS — R60.0 LOWER EXTREMITY EDEMA: ICD-10-CM

## 2018-04-18 DIAGNOSIS — E55.9 VITAMIN D DEFICIENCY: ICD-10-CM

## 2018-04-18 DIAGNOSIS — E66.01 MORBID OBESITY WITH BMI OF 40.0-44.9, ADULT (HCC): ICD-10-CM

## 2018-04-18 DIAGNOSIS — Z51.81 ENCOUNTER FOR THERAPEUTIC DRUG MONITORING: ICD-10-CM

## 2018-04-18 DIAGNOSIS — E66.01 MORBID OBESITY (HCC): ICD-10-CM

## 2018-04-18 DIAGNOSIS — M17.12 PRIMARY OSTEOARTHRITIS OF LEFT KNEE: ICD-10-CM

## 2018-04-18 DIAGNOSIS — Z99.89 OSA ON CPAP: ICD-10-CM

## 2018-04-18 DIAGNOSIS — R06.83 SNORING: ICD-10-CM

## 2018-04-18 DIAGNOSIS — G47.33 OSA ON CPAP: ICD-10-CM

## 2018-04-18 PROCEDURE — 84466 ASSAY OF TRANSFERRIN: CPT

## 2018-04-18 PROCEDURE — 82746 ASSAY OF FOLIC ACID SERUM: CPT

## 2018-04-18 PROCEDURE — 83735 ASSAY OF MAGNESIUM: CPT

## 2018-04-18 PROCEDURE — 83540 ASSAY OF IRON: CPT

## 2018-04-18 PROCEDURE — 84100 ASSAY OF PHOSPHORUS: CPT

## 2018-04-18 PROCEDURE — 82728 ASSAY OF FERRITIN: CPT

## 2018-04-18 PROCEDURE — 83036 HEMOGLOBIN GLYCOSYLATED A1C: CPT

## 2018-04-18 PROCEDURE — 36415 COLL VENOUS BLD VENIPUNCTURE: CPT

## 2018-04-18 PROCEDURE — 82306 VITAMIN D 25 HYDROXY: CPT

## 2018-04-18 PROCEDURE — 84425 ASSAY OF VITAMIN B-1: CPT

## 2018-04-23 ENCOUNTER — TELEPHONE (OUTPATIENT)
Dept: SURGERY | Facility: CLINIC | Age: 28
End: 2018-04-23

## 2018-04-23 NOTE — TELEPHONE ENCOUNTER
Called patient and left message for her to call me back in regards to her results. Patient has a hmo and needs to get a referral to get her thiamine injection.

## 2018-04-23 NOTE — TELEPHONE ENCOUNTER
Dr Jackson Vaughn texted Kimberly Villegas to let her know patient Reji Sierra is low on her thiamine. Per Dr Jackson Vaughn call patient and scheduled patient for a B1 injection.

## 2018-04-25 ENCOUNTER — OFFICE VISIT (OUTPATIENT)
Dept: DERMATOLOGY CLINIC | Facility: CLINIC | Age: 28
End: 2018-04-25

## 2018-04-25 DIAGNOSIS — L70.0 ACNE VULGARIS: Primary | ICD-10-CM

## 2018-04-25 PROCEDURE — 99202 OFFICE O/P NEW SF 15 MIN: CPT | Performed by: DERMATOLOGY

## 2018-04-25 PROCEDURE — 99212 OFFICE O/P EST SF 10 MIN: CPT | Performed by: DERMATOLOGY

## 2018-04-25 RX ORDER — DOXYCYCLINE HYCLATE 100 MG/1
100 CAPSULE ORAL 2 TIMES DAILY
Qty: 60 CAPSULE | Refills: 6 | Status: SHIPPED | OUTPATIENT
Start: 2018-04-25 | End: 2018-05-25

## 2018-04-25 RX ORDER — METRONIDAZOLE 7.5 MG/G
GEL TOPICAL
Qty: 45 G | Refills: 1 | Status: SHIPPED | OUTPATIENT
Start: 2018-04-25 | End: 2018-05-09 | Stop reason: SINTOL

## 2018-04-25 NOTE — TELEPHONE ENCOUNTER
Called and spoke with [place]. Explained that her thiamine level is low and she is need of B1 injection monthly. Patient is hmo and will contact her Md for a referral to have them done at our office.   I asked her to please call us and let us know if she can

## 2018-04-26 ENCOUNTER — OFFICE VISIT (OUTPATIENT)
Dept: PULMONOLOGY | Facility: CLINIC | Age: 28
End: 2018-04-26

## 2018-04-26 VITALS
HEART RATE: 91 BPM | SYSTOLIC BLOOD PRESSURE: 106 MMHG | RESPIRATION RATE: 18 BRPM | BODY MASS INDEX: 39.55 KG/M2 | HEIGHT: 64 IN | WEIGHT: 231.63 LBS | DIASTOLIC BLOOD PRESSURE: 67 MMHG | OXYGEN SATURATION: 99 %

## 2018-04-26 DIAGNOSIS — Z01.818 PREOPERATIVE CLEARANCE: Primary | ICD-10-CM

## 2018-04-26 DIAGNOSIS — G47.33 OSA (OBSTRUCTIVE SLEEP APNEA): ICD-10-CM

## 2018-04-26 DIAGNOSIS — E51.9 THIAMIN DEFICIENCY: Primary | ICD-10-CM

## 2018-04-26 PROCEDURE — 99212 OFFICE O/P EST SF 10 MIN: CPT | Performed by: INTERNAL MEDICINE

## 2018-04-26 PROCEDURE — 99244 OFF/OP CNSLTJ NEW/EST MOD 40: CPT | Performed by: INTERNAL MEDICINE

## 2018-04-26 RX ORDER — THIAMINE HYDROCHLORIDE 100 MG/ML
100 INJECTION, SOLUTION INTRAMUSCULAR; INTRAVENOUS DAILY
Qty: 1 VIAL | Refills: 3 | Status: SHIPPED | OUTPATIENT
Start: 2018-04-26 | End: 2018-09-19

## 2018-04-26 NOTE — H&P
Referring Physician  Jadon Gonzáles MD    Chief Complaint  Preoperative pulmonary clearance    History of Present Illness  It is a 26-year-old -American female who presents today for preoperative pulmonary clearance for upcoming bariatric weight loss No current facility-administered medications on file prior to visit.      Allergies    Betadine [Povidone *    Rash    Physical Exam  Constitutional: no acute distress  HEENT: PERRL  Cardio: RRR, S1 S2  Respiratory: clear to auscultation bilaterally, no

## 2018-04-27 ENCOUNTER — TELEPHONE (OUTPATIENT)
Dept: INTERNAL MEDICINE CLINIC | Facility: CLINIC | Age: 28
End: 2018-04-27

## 2018-04-27 NOTE — TELEPHONE ENCOUNTER
Patient's pharmacy called stating that the order of b1 that was sent yesterday was wrong that they wanted b-12  Patient had called me yesterday stating that the doctor wanted her to get thiamine which is B-1  Will not re submit any thing until I talk to th

## 2018-05-02 ENCOUNTER — OFFICE VISIT (OUTPATIENT)
Dept: SURGERY | Facility: CLINIC | Age: 28
End: 2018-05-02

## 2018-05-02 ENCOUNTER — TELEPHONE (OUTPATIENT)
Dept: SURGERY | Facility: CLINIC | Age: 28
End: 2018-05-02

## 2018-05-02 VITALS — HEIGHT: 64 IN | WEIGHT: 229.31 LBS | BODY MASS INDEX: 39.15 KG/M2

## 2018-05-02 DIAGNOSIS — E66.9 OBESITY (BMI 30-39.9): ICD-10-CM

## 2018-05-02 PROCEDURE — 97803 MED NUTRITION INDIV SUBSEQ: CPT | Performed by: DIETITIAN, REGISTERED

## 2018-05-02 NOTE — PROGRESS NOTES
[facility]  [address] [place] [id number]  Dept: [phone]  Loc: [phone]    05/02/18      Bariatric Follow-up Nutrition Session    Manafaby Ventura is a 25year old female.      Tamara [id number] 19.0 04/18/2018       Lab Results  Component Value Date/Time   THIAMINE 62 (L) 04/18/2018 09:18 AM      No results found for: VITB1    Lab Results  Component Value Date/Time   FOLIC 88.2 65/44/5846 73:40 AM        Meds:     Current Outpatient Pr to diet: yes, choosing to lower fat dairy, now avoids bread, working on reducing frequency of fast food, smaller portions from earlier satiety with the help of phentermine. Food intolerances:  dairy  Vitamin/mineral supplements:  Other:  To start B1 inject visits required to review concepts? Yes  Patient understands protein requirements? Re-reviewed, will cont review/reinforce  Patient understand fluid requirements (amount and method of intake)? Yes  Patient understands post-operative diet?  Yes, though needs

## 2018-05-02 NOTE — TELEPHONE ENCOUNTER
Called patient and let her know Carrol Pelayo called in a RX for drisdol 50,000 units one tablet weekly for 12 weeks. Patient acknowledged and understood.

## 2018-05-06 NOTE — PROGRESS NOTES
Micaela Rajput is a 25year old female. Patient presents with:  Acne: New pt. Pt presents with facial acne for x 2-4 months. Pt believes she started breaking out after being on hydrochlorothiazide. Pt using [place] products without relief. face Disp: 45 g Rfl: 1   Tretinoin 0.05 % External Cream Apply to the face as directed at bedtime.  Disp: 20 g Rfl: 3   HYDROCHLOROTHIAZIDE 12.5 MG Oral Cap TAKE ONE CAPSULE BY MOUTH EVERY DAY Disp: 90 capsule Rfl: 0   Phentermine HCl 15 MG Oral Cap Take 1 history. HPI :    Patient presents with:  Acne: New pt. Pt presents with facial acne for x 2-4 months. Pt believes she started breaking out after being on hydrochlorothiazide. Pt using [place] products without relief.        Seen for acne the next several weeks. Await clinical response to above therapy. Some areas more perioral dermatitis-like    Discussed use of topical retinoids. Mild cleanser, moisturizer underneath medication.   Use sparingly not more than pea size amount for the enti

## 2018-05-07 ENCOUNTER — TELEPHONE (OUTPATIENT)
Dept: INTERNAL MEDICINE CLINIC | Facility: CLINIC | Age: 28
End: 2018-05-07

## 2018-05-07 DIAGNOSIS — Z01.818 PREOPERATIVE TESTING: Primary | ICD-10-CM

## 2018-05-09 ENCOUNTER — TELEPHONE (OUTPATIENT)
Dept: SURGERY | Facility: CLINIC | Age: 28
End: 2018-05-09

## 2018-05-09 ENCOUNTER — TELEPHONE (OUTPATIENT)
Dept: INTERNAL MEDICINE CLINIC | Facility: CLINIC | Age: 28
End: 2018-05-09

## 2018-05-09 ENCOUNTER — OFFICE VISIT (OUTPATIENT)
Dept: SURGERY | Facility: CLINIC | Age: 28
End: 2018-05-09

## 2018-05-09 ENCOUNTER — OFFICE VISIT (OUTPATIENT)
Dept: NUTRITION/OBESITY MEDICINE | Facility: HOSPITAL | Age: 28
End: 2018-05-09
Attending: SINGLE SPECIALTY
Payer: COMMERCIAL

## 2018-05-09 VITALS
WEIGHT: 231.19 LBS | RESPIRATION RATE: 16 BRPM | HEART RATE: 71 BPM | BODY MASS INDEX: 39.47 KG/M2 | SYSTOLIC BLOOD PRESSURE: 115 MMHG | HEIGHT: 64 IN | DIASTOLIC BLOOD PRESSURE: 69 MMHG | OXYGEN SATURATION: 99 %

## 2018-05-09 DIAGNOSIS — E66.01 MORBID OBESITY WITH BMI OF 40.0-44.9, ADULT (HCC): Primary | ICD-10-CM

## 2018-05-09 DIAGNOSIS — Z51.81 ENCOUNTER FOR THERAPEUTIC DRUG MONITORING: ICD-10-CM

## 2018-05-09 DIAGNOSIS — E66.01 MORBID OBESITY WITH BMI OF 40.0-44.9, ADULT (HCC): ICD-10-CM

## 2018-05-09 DIAGNOSIS — R60.0 LOWER EXTREMITY EDEMA: ICD-10-CM

## 2018-05-09 DIAGNOSIS — Z99.89 OSA ON CPAP: ICD-10-CM

## 2018-05-09 DIAGNOSIS — E66.9 OBESITY (BMI 30-39.9): ICD-10-CM

## 2018-05-09 DIAGNOSIS — E66.01 MORBID OBESITY DUE TO EXCESS CALORIES (HCC): Primary | ICD-10-CM

## 2018-05-09 DIAGNOSIS — G47.33 OSA ON CPAP: ICD-10-CM

## 2018-05-09 DIAGNOSIS — Z99.89 OSA ON CPAP: Primary | ICD-10-CM

## 2018-05-09 DIAGNOSIS — M17.12 PRIMARY OSTEOARTHRITIS OF LEFT KNEE: ICD-10-CM

## 2018-05-09 DIAGNOSIS — G47.33 OSA ON CPAP: Primary | ICD-10-CM

## 2018-05-09 PROCEDURE — 99214 OFFICE O/P EST MOD 30 MIN: CPT | Performed by: INTERNAL MEDICINE

## 2018-05-09 PROCEDURE — 96101 PSYCL TSTG PR HR F2F TIME W/PT: CPT | Performed by: OTHER

## 2018-05-09 RX ORDER — PHENTERMINE HYDROCHLORIDE 15 MG/1
15 CAPSULE ORAL EVERY MORNING
Qty: 30 CAPSULE | Refills: 1 | Status: SHIPPED | OUTPATIENT
Start: 2018-05-09 | End: 2018-05-09

## 2018-05-09 NOTE — PROGRESS NOTES
[address], [address] [facility] [address]  Dept: [phone]     Date:   2017    Patient:  Rey Rayo  :      1990  MRN:      [id number]    Chief Complaint: Oral Cap TAKE ONE CAPSULE BY MOUTH EVERY DAY Disp: 90 capsule Rfl: 0   Phentermine HCl 15 MG Oral Cap Take 1 capsule (15 mg total) by mouth every morning.  Disp: 30 capsule Rfl: 1     Allergies:  Betadine [Povidone Iodine]     Social History:      Social Hi Modifications Reviewed and Discussed  Eat breakfast, Eat 3 meals per day, Plan meals in advance, Read nutrition labels, Drink 64 oz of water per day, Maintain a daily food journal, No drinking 30 minutes before or after meals, Utlize portion control strate edema  Primary osteoarthritis of left knee  Encounter for therapeutic drug monitoring  Obesity (bmi 30-39. 9)    PLAN   Discussed with patient the risks and benefits of VSG.  The patient is interested in bariatric surgery and will begin our presurgical proce

## 2018-05-09 NOTE — PROGRESS NOTES
Psychological Evaluation    Patient Name: Yaakov Wilkins  Visit Date:  2018  :   1990    Reason for Referral:    Ashvin Mejia is a 25year old single  female who was referred for a psychological evaluation prior to being scheduled for that her maternal grandmother suffers from diabetes. She also reported an extensive history of lung cancer in her family. [facility] denied a history, as well as familial history of psychological and/or substance abuse issues.     Knowledge of Procedure a 15 minutes late to the assessment. There appeared to be no abnormalities in her sensorium or mental capacity. Her overall affect fell within the normal range and her mood was euthymic. Also, her speech and thought processes fell within the normal range. chicken, turkey, or fish per day, uses regular salad dressing, eats regular sweets and ice cream, eats high sodium processed foods, and adds salt to food.   Furthermore, [place] endorsed a strong willingness to further refine her eating habits in an effort t with [facility], please feel free to contact me if you have any additional questions or concerns.     Puma Benson PsyD  5/9/2018  8:25 AM

## 2018-05-09 NOTE — TELEPHONE ENCOUNTER
5/9/18 @ 1:41pm Left voicemail on Referral line at Dr. Tamra Nelson office. Dr. Tamra Nelson staff faxed over the 4/11/18 order/referral for Dr. Bubba Rob however we are in need of the [facility] authorization to cover today's visit.    Patient used the following Referral/Auths:  10

## 2018-05-17 ENCOUNTER — OFFICE VISIT (OUTPATIENT)
Dept: SLEEP CENTER | Age: 28
End: 2018-05-17
Attending: INTERNAL MEDICINE
Payer: COMMERCIAL

## 2018-05-17 DIAGNOSIS — Z76.89 SLEEP CONCERN: Primary | ICD-10-CM

## 2018-05-17 PROCEDURE — 95810 POLYSOM 6/> YRS 4/> PARAM: CPT

## 2018-05-24 NOTE — PROCEDURES
[facility]  Accredited by the [place] of [employer] (AASM)    PATIENT'S NAME: Paula Palumbo   ATTENDING PHYSICIAN: Luz Thakkar. Toshia Moses DO   REFERRING PHYSICIAN: Luz Thakkar.  Daniel Tang ACCOUNT #: [de-identified] LOCATION: 104 minutes in the right side position. The respiratory event related arousal index is 2.8 events per hour and the spontaneous arousal index is 3.6 events per hour, for a combined arousal index of 9.0 events per hour.   There were 55 periodic limb movement

## 2018-05-25 ENCOUNTER — TELEPHONE (OUTPATIENT)
Dept: PULMONOLOGY | Facility: CLINIC | Age: 28
End: 2018-05-25

## 2018-05-25 DIAGNOSIS — G47.33 OSA (OBSTRUCTIVE SLEEP APNEA): Primary | ICD-10-CM

## 2018-05-25 NOTE — TELEPHONE ENCOUNTER
----- Message from Ely Loera DO sent at 5/25/2018  4:05 PM CDT -----  You may let the patient know that I reviewed her sleep study results. She has evidence of mild obstructive sleep apnea which becomes moderate during REM sleep.   If she is agree

## 2018-05-25 NOTE — TELEPHONE ENCOUNTER
Pt stts she is agreeable with auto cpap machine. Pt aware order will be faxed to [facility] [phone]. Pt aware she need to follow up 6 to 8 weeks for cpap compliance after she is set up.

## 2018-05-30 NOTE — TELEPHONE ENCOUNTER
Spoke to pt. Pt notified of below. Pt aware that she needs to follow up with Dr. Petr Mendieta 30 days after she is set up with cpap machine. Pt aware that the order for cpap machine will be faxed to [facility] [phone].  Pt informed if she doesn't hear from them to

## 2018-06-10 DIAGNOSIS — R60.0 LOWER EXTREMITY EDEMA: ICD-10-CM

## 2018-06-11 RX ORDER — HYDROCHLOROTHIAZIDE 12.5 MG/1
12.5 CAPSULE, GELATIN COATED ORAL DAILY
Qty: 90 CAPSULE | Refills: 0 | Status: SHIPPED | OUTPATIENT
Start: 2018-06-11 | End: 2018-07-05

## 2018-06-25 ENCOUNTER — OFFICE VISIT (OUTPATIENT)
Dept: INTERNAL MEDICINE CLINIC | Facility: CLINIC | Age: 28
End: 2018-06-25

## 2018-06-25 VITALS
WEIGHT: 228 LBS | HEIGHT: 64 IN | HEART RATE: 84 BPM | TEMPERATURE: 98 F | RESPIRATION RATE: 21 BRPM | SYSTOLIC BLOOD PRESSURE: 130 MMHG | DIASTOLIC BLOOD PRESSURE: 70 MMHG | BODY MASS INDEX: 38.93 KG/M2 | OXYGEN SATURATION: 99 %

## 2018-06-25 DIAGNOSIS — Z00.00 GENERAL MEDICAL EXAM: Primary | ICD-10-CM

## 2018-06-25 DIAGNOSIS — G43.C0 PERIODIC HEADACHE SYNDROME, NOT INTRACTABLE: ICD-10-CM

## 2018-06-25 DIAGNOSIS — J02.9 ACUTE PHARYNGITIS, UNSPECIFIED ETIOLOGY: ICD-10-CM

## 2018-06-25 DIAGNOSIS — E66.9 OBESITY (BMI 30-39.9): ICD-10-CM

## 2018-06-25 PROCEDURE — 99395 PREV VISIT EST AGE 18-39: CPT | Performed by: INTERNAL MEDICINE

## 2018-06-25 RX ORDER — AZITHROMYCIN 250 MG/1
TABLET, FILM COATED ORAL
Qty: 6 TABLET | Refills: 0 | Status: SHIPPED | OUTPATIENT
Start: 2018-06-25 | End: 2018-07-23

## 2018-06-25 RX ORDER — METRONIDAZOLE 7.5 MG/G
GEL TOPICAL
Refills: 1 | COMMUNITY
Start: 2018-04-25 | End: 2018-07-23

## 2018-06-25 RX ORDER — PHENTERMINE HYDROCHLORIDE 15 MG/1
CAPSULE ORAL
Refills: 1 | COMMUNITY
Start: 2018-05-09 | End: 2018-07-05

## 2018-06-25 NOTE — PROGRESS NOTES
Christianne Wade is a 25year old female who presents for a complete physical exam. Symptoms: denies discharge, itching, burning or dysuria. LMP 6/14/2018. Sexually active. No menstrual disorder. Vacationed in [place].  Developed soreness of left throat a was treated. • Heart murmur     Had a heart murmur when she was born until 10 years. Does not take any medicine. • Human papilloma virus infection     HPV and had a colpo-8/2013.    • Hypertension     Had pre clampsia during pregnancy and pt was induce EOMI,conjunctiva are clear  NECK: supple,no adenopathy,no bruits  CHEST: no chest tenderness  BREAST: no dominant or suspicious mass  LUNGS: clear to auscultation  CARDIO: RRR without murmur  GI: good BS's,no masses, HSM or tendernes  MUSCULOSKELETAL: back

## 2018-06-26 PROBLEM — G43.C0 PERIODIC HEADACHE SYNDROME, NOT INTRACTABLE: Status: ACTIVE | Noted: 2018-06-26

## 2018-06-26 PROBLEM — Z86.69 HISTORY OF MIGRAINE HEADACHES: Status: ACTIVE | Noted: 2018-06-26

## 2018-07-02 ENCOUNTER — TELEPHONE (OUTPATIENT)
Dept: SURGERY | Facility: CLINIC | Age: 28
End: 2018-07-02

## 2018-07-02 DIAGNOSIS — R60.0 LOWER EXTREMITY EDEMA: ICD-10-CM

## 2018-07-02 NOTE — TELEPHONE ENCOUNTER
Patient took the last of her medication today. She is in need of a refill as soon as possible for both the Phentermine and her water pill. Please call patient with any questions or to let her know when this has been filled. [phone].    Patient has appt

## 2018-07-03 NOTE — TELEPHONE ENCOUNTER
Called and left message for patient to let her know Dr Parish Gloria is out of town until Thursday and I will send him the message and get back to her on Thursday.

## 2018-07-05 ENCOUNTER — TELEPHONE (OUTPATIENT)
Dept: SURGERY | Facility: CLINIC | Age: 28
End: 2018-07-05

## 2018-07-05 RX ORDER — PHENTERMINE HYDROCHLORIDE 15 MG/1
CAPSULE ORAL
Qty: 30 CAPSULE | Refills: 1 | Status: SHIPPED | OUTPATIENT
Start: 2018-07-05 | End: 2018-07-25 | Stop reason: DRUGHIGH

## 2018-07-05 RX ORDER — HYDROCHLOROTHIAZIDE 12.5 MG/1
12.5 CAPSULE, GELATIN COATED ORAL DAILY
Qty: 90 CAPSULE | Refills: 0 | Status: SHIPPED | OUTPATIENT
Start: 2018-07-05 | End: 2018-07-25

## 2018-07-05 NOTE — TELEPHONE ENCOUNTER
Per dr Sofya Chun me rx) called [facility] and [facility] for Phentermine 15mg one tablet daily #30 with one refill. Called patient and left message to let her know her [facility] in [place].

## 2018-07-13 ENCOUNTER — TELEPHONE (OUTPATIENT)
Dept: PEDIATRICS CLINIC | Facility: CLINIC | Age: 28
End: 2018-07-13

## 2018-07-13 NOTE — TELEPHONE ENCOUNTER
Pt requesting appt for IUD removal, denies having any problems with the IUD. (Paragard IUD placed 01/2015 by Giuseppe Wilson).  Informed pt that she will need to come in for annual exam first then have a separate appt for IUD removal. Informed pt her last annual was 6/

## 2018-07-19 ENCOUNTER — OFFICE VISIT (OUTPATIENT)
Dept: PULMONOLOGY | Facility: CLINIC | Age: 28
End: 2018-07-19

## 2018-07-19 ENCOUNTER — HOSPITAL ENCOUNTER (OUTPATIENT)
Dept: GENERAL RADIOLOGY | Facility: HOSPITAL | Age: 28
Discharge: HOME | End: 2018-07-19
Attending: INTERNAL MEDICINE
Payer: COMMERCIAL

## 2018-07-19 VITALS
OXYGEN SATURATION: 99 % | DIASTOLIC BLOOD PRESSURE: 69 MMHG | HEART RATE: 82 BPM | SYSTOLIC BLOOD PRESSURE: 108 MMHG | RESPIRATION RATE: 19 BRPM | WEIGHT: 233.81 LBS | BODY MASS INDEX: 39.91 KG/M2 | HEIGHT: 64 IN

## 2018-07-19 DIAGNOSIS — G47.33 OSA (OBSTRUCTIVE SLEEP APNEA): Primary | ICD-10-CM

## 2018-07-19 DIAGNOSIS — Z01.818 PREOPERATIVE CLEARANCE: ICD-10-CM

## 2018-07-19 PROCEDURE — 99213 OFFICE O/P EST LOW 20 MIN: CPT | Performed by: INTERNAL MEDICINE

## 2018-07-19 PROCEDURE — 71046 X-RAY EXAM CHEST 2 VIEWS: CPT | Performed by: INTERNAL MEDICINE

## 2018-07-19 PROCEDURE — 99212 OFFICE O/P EST SF 10 MIN: CPT | Performed by: INTERNAL MEDICINE

## 2018-07-19 NOTE — PROGRESS NOTES
Referring Physician  Jadon Gonzáles MD    History of Present Illness  Patient is a 25-year-old female who presents to pulmonary clinic for follow-up visit. She had recent polysomnography with evidence of mild obstructive sleep apnea.   She does occasionally I encouraged the patient to use her device more frequently. She may attempt CPAP desensitization throughout the day.   I encouraged her to use the device in the postoperative setting.  -I will obtain PA lateral chest x-ray and if unremarkable patient clear

## 2018-07-23 ENCOUNTER — OFFICE VISIT (OUTPATIENT)
Dept: OBGYN CLINIC | Facility: CLINIC | Age: 28
End: 2018-07-23

## 2018-07-23 VITALS
DIASTOLIC BLOOD PRESSURE: 74 MMHG | SYSTOLIC BLOOD PRESSURE: 109 MMHG | HEART RATE: 64 BPM | BODY MASS INDEX: 39 KG/M2 | WEIGHT: 227.38 LBS

## 2018-07-23 DIAGNOSIS — Z01.419 ENCOUNTER FOR GYNECOLOGICAL EXAMINATION WITHOUT ABNORMAL FINDING: Primary | ICD-10-CM

## 2018-07-23 PROCEDURE — 99395 PREV VISIT EST AGE 18-39: CPT | Performed by: OBSTETRICS & GYNECOLOGY

## 2018-07-23 NOTE — H&P
HPI:  The patient is a 24 yo F here for WWE. NO complaints. Monthly menses with paraguard for contraception. +IC with 1 partner of 11 years.       LPS:  12/16/16-neg/neg  Reviewed medical and surgical history below       OBSTETRICS HISTORY:  Obstetric Prescriptions:   •  Phentermine HCl 15 MG Oral Cap, TAKE ONE CAPSULE BY MOUTH EVERY MORNING, Disp: 30 capsule, Rfl: 1  •  hydrochlorothiazide 12.5 MG Oral Cap, Take 1 capsule (12.5 mg total) by mouth daily. , Disp: 90 capsule, Rfl: 0  •  Thiamine HCl 100 MG position, mobility, without tenderness  Adnexa: normal without masses or tenderness  Perineum: normal    Assessment/Plan:  Zaid Little was seen today for gyn exam.    Diagnoses and all orders for this visit:    Encounter for gynecological examination without ab

## 2018-07-25 ENCOUNTER — OFFICE VISIT (OUTPATIENT)
Dept: SURGERY | Facility: CLINIC | Age: 28
End: 2018-07-25

## 2018-07-25 VITALS
SYSTOLIC BLOOD PRESSURE: 125 MMHG | HEIGHT: 64 IN | DIASTOLIC BLOOD PRESSURE: 77 MMHG | BODY MASS INDEX: 39.61 KG/M2 | OXYGEN SATURATION: 98 % | HEART RATE: 80 BPM | RESPIRATION RATE: 18 BRPM | WEIGHT: 232 LBS

## 2018-07-25 DIAGNOSIS — R60.0 LOWER EXTREMITY EDEMA: ICD-10-CM

## 2018-07-25 DIAGNOSIS — G47.33 OSA ON CPAP: Primary | ICD-10-CM

## 2018-07-25 DIAGNOSIS — E66.9 OBESITY (BMI 30-39.9): ICD-10-CM

## 2018-07-25 DIAGNOSIS — Z99.89 OSA ON CPAP: Primary | ICD-10-CM

## 2018-07-25 DIAGNOSIS — Z51.81 ENCOUNTER FOR THERAPEUTIC DRUG MONITORING: ICD-10-CM

## 2018-07-25 DIAGNOSIS — M17.12 PRIMARY OSTEOARTHRITIS OF LEFT KNEE: ICD-10-CM

## 2018-07-25 PROCEDURE — 99213 OFFICE O/P EST LOW 20 MIN: CPT | Performed by: INTERNAL MEDICINE

## 2018-07-25 RX ORDER — HYDROCHLOROTHIAZIDE 12.5 MG/1
12.5 CAPSULE, GELATIN COATED ORAL DAILY
Qty: 90 CAPSULE | Refills: 0 | Status: SHIPPED | OUTPATIENT
Start: 2018-07-25 | End: 2018-09-19

## 2018-07-25 RX ORDER — PHENTERMINE HYDROCHLORIDE 30 MG/1
30 CAPSULE ORAL EVERY MORNING
Qty: 30 CAPSULE | Refills: 1 | Status: SHIPPED | OUTPATIENT
Start: 2018-07-25 | End: 2018-10-10

## 2018-07-25 NOTE — PROGRESS NOTES
[address], [address] 91 [facility] [address]  Dept: [phone]     Date:   2017    Patient:  Salvador Martinez  :      1990  MRN:      [id number]    Chief Complaint: Social History Main Topics   Smoking status: Never Smoker    Smokeless tobacco: Never Used    Alcohol use Yes  0.0 oz/week     Comment: SOCIALLY    Drug use: No    Sexual activity: Yes    Partners: Male    Birth control/ protection: IUD, Paragard     O Reviewed and Discussed    Walk for  30 Minutes    ROS:    Review of Systems   Constitutional: Negative. Negative for activity change, appetite change, chills and fatigue. HENT: Negative. Respiratory: Negative.   Negative for cough, shortness of breath activity-upper body exercises, walk 10 minutes per day. 4. Increase fruit and vegetable servings to 5-6 per day.       OBSTRUCTIVE SLEEP APNEA: The patient states her sleep apnea has been stable since the last clinic visit.  There has not been any increase

## 2018-07-26 ENCOUNTER — TELEPHONE (OUTPATIENT)
Dept: SURGERY | Facility: CLINIC | Age: 28
End: 2018-07-26

## 2018-07-26 RX ORDER — HYDROCHLOROTHIAZIDE 12.5 MG/1
12.5 TABLET ORAL DAILY
Qty: 90 TABLET | Refills: 3 | Status: SHIPPED | OUTPATIENT
Start: 2018-07-26 | End: 2018-10-10

## 2018-07-29 DIAGNOSIS — E55.9 VITAMIN D DEFICIENCY: ICD-10-CM

## 2018-07-30 RX ORDER — ERGOCALCIFEROL 1.25 MG/1
CAPSULE ORAL
Qty: 12 CAPSULE | Refills: 0 | Status: SHIPPED | OUTPATIENT
Start: 2018-07-30 | End: 2018-10-21

## 2018-09-19 ENCOUNTER — OFFICE VISIT (OUTPATIENT)
Dept: OBGYN CLINIC | Facility: CLINIC | Age: 28
End: 2018-09-19

## 2018-09-19 ENCOUNTER — APPOINTMENT (OUTPATIENT)
Dept: LAB | Facility: HOSPITAL | Age: 28
End: 2018-09-19
Attending: OBSTETRICS & GYNECOLOGY
Payer: COMMERCIAL

## 2018-09-19 ENCOUNTER — TELEPHONE (OUTPATIENT)
Dept: OBGYN CLINIC | Facility: CLINIC | Age: 28
End: 2018-09-19

## 2018-09-19 VITALS
BODY MASS INDEX: 38 KG/M2 | SYSTOLIC BLOOD PRESSURE: 118 MMHG | WEIGHT: 220 LBS | HEART RATE: 89 BPM | DIASTOLIC BLOOD PRESSURE: 81 MMHG

## 2018-09-19 DIAGNOSIS — Z11.3 SCREEN FOR STD (SEXUALLY TRANSMITTED DISEASE): ICD-10-CM

## 2018-09-19 DIAGNOSIS — N94.89 LABIAL SWELLING: Primary | ICD-10-CM

## 2018-09-19 LAB
HAV IGM SERPL QL IA: NONREACTIVE
HBV CORE IGM SERPL QL IA: NONREACTIVE
HBV SURFACE AG SERPL QL IA: NONREACTIVE
HCV AB SERPL QL IA: NONREACTIVE
HIV1+2 AB SERPL QL IA: NONREACTIVE

## 2018-09-19 PROCEDURE — 86780 TREPONEMA PALLIDUM: CPT

## 2018-09-19 PROCEDURE — 36415 COLL VENOUS BLD VENIPUNCTURE: CPT

## 2018-09-19 PROCEDURE — 80074 ACUTE HEPATITIS PANEL: CPT

## 2018-09-19 PROCEDURE — 99213 OFFICE O/P EST LOW 20 MIN: CPT | Performed by: OBSTETRICS & GYNECOLOGY

## 2018-09-19 PROCEDURE — 87389 HIV-1 AG W/HIV-1&-2 AB AG IA: CPT

## 2018-09-19 NOTE — H&P
HPI:  The patient is a 26 yo F c/o 2 days left labial swelling. H/o herpes and concerned for possible outbreak. No pain or itching. No discharge. Also wants full STD screen before pulling IUD as she had her s/o want to try and conceive.       Reviewed m tobacco: Never Used    Substance and Sexual Activity      Alcohol use:  Yes        Alcohol/week: 0.0 oz        Comment: SOCIALLY      Drug use: No      Sexual activity: Yes        Partners: Male        Birth control/protection: IUD, Paragard    Other Topics lesions  Vagina:  Normal appearance without lesions, no abnormal discharge  Cervix:  Normal appearing; strings present  Perineum: normal    Assessment/Plan:    Zaid Little was seen today for gyn problem.     Diagnoses and all orders for this visit:    Labial swe

## 2018-09-19 NOTE — TELEPHONE ENCOUNTER
REILLY CALLING FROM [facility] REGISTRATION / REQUESTING REQUESTING AN ORDER / THE ORDER NOT IN SYSTEM YET / PT WAITING / PLS ADV

## 2018-09-20 ENCOUNTER — TELEPHONE (OUTPATIENT)
Dept: OBGYN CLINIC | Facility: CLINIC | Age: 28
End: 2018-09-20

## 2018-09-20 ENCOUNTER — OFFICE VISIT (OUTPATIENT)
Dept: INTERNAL MEDICINE CLINIC | Facility: CLINIC | Age: 28
End: 2018-09-20

## 2018-09-20 VITALS
RESPIRATION RATE: 18 BRPM | HEART RATE: 86 BPM | BODY MASS INDEX: 38 KG/M2 | DIASTOLIC BLOOD PRESSURE: 82 MMHG | WEIGHT: 220.81 LBS | SYSTOLIC BLOOD PRESSURE: 120 MMHG | OXYGEN SATURATION: 98 %

## 2018-09-20 DIAGNOSIS — N94.89 LABIAL SWELLING: ICD-10-CM

## 2018-09-20 DIAGNOSIS — A54.9 GONORRHEA: Primary | ICD-10-CM

## 2018-09-20 LAB
C TRACH DNA SPEC QL NAA+PROBE: NEGATIVE
N GONORRHOEA DNA SPEC QL NAA+PROBE: POSITIVE

## 2018-09-20 PROCEDURE — 99213 OFFICE O/P EST LOW 20 MIN: CPT | Performed by: INTERNAL MEDICINE

## 2018-09-20 NOTE — TELEPHONE ENCOUNTER
Amada Rainey from Dr. Charlotte Lawrence office states pt is positive for gonorrhea and Dr. Emmett Garcia is wondering if this office will treat pt.  Pt is there now for an appt

## 2018-09-20 NOTE — PROGRESS NOTES
HPI:    Patient ID: Giuseppe Garcia is a 25year old female. HPI   Patient just returned from vacation in [facility][note 3]. Noticed swelling and irritation of labia. Denies vaginal discharge. She was sexuallay active 1 mos ago.  Skinny Has in motorcycle for several ho Genitourinary:   Genitourinary Comments: Mild swelling of right labia. NO blister, nor ulcer . Right inguinal lymph node palpable. Musculoskeletal: Normal range of motion. Neurological: She is alert and oriented to person, place, and time.    Psychia

## 2018-09-20 NOTE — TELEPHONE ENCOUNTER
Informed Felecia from [address] [place] [address] office (PCP) that ob/gyne office will address since [address] was the ordering MD and we will contact pt to set up appt for Rocephin injection once we receive order from MD. Star Cobb verbalized understanding.

## 2018-09-21 ENCOUNTER — NURSE ONLY (OUTPATIENT)
Dept: OBGYN CLINIC | Facility: CLINIC | Age: 28
End: 2018-09-21

## 2018-09-21 ENCOUNTER — TELEPHONE (OUTPATIENT)
Dept: INTERNAL MEDICINE CLINIC | Facility: CLINIC | Age: 28
End: 2018-09-21

## 2018-09-21 VITALS — DIASTOLIC BLOOD PRESSURE: 74 MMHG | HEART RATE: 91 BPM | SYSTOLIC BLOOD PRESSURE: 106 MMHG

## 2018-09-21 DIAGNOSIS — A64 STD (FEMALE): Primary | ICD-10-CM

## 2018-09-21 LAB
T PALLIDUM AB SER QL: NEGATIVE
T VAGINALIS RRNA SPEC QL NAA+PROBE: NEGATIVE

## 2018-09-21 PROCEDURE — 96372 THER/PROPH/DIAG INJ SC/IM: CPT | Performed by: OBSTETRICS & GYNECOLOGY

## 2018-09-21 RX ORDER — CEFTRIAXONE SODIUM 250 MG/1
250 INJECTION, POWDER, FOR SOLUTION INTRAMUSCULAR; INTRAVENOUS ONCE
Status: COMPLETED | OUTPATIENT
Start: 2018-09-21 | End: 2018-09-21

## 2018-09-21 RX ORDER — AZITHROMYCIN 500 MG/1
TABLET, FILM COATED ORAL
Qty: 2 TABLET | Refills: 0 | Status: SHIPPED | OUTPATIENT
Start: 2018-09-21 | End: 2018-10-04 | Stop reason: ALTCHOICE

## 2018-09-21 RX ADMIN — CEFTRIAXONE SODIUM 250 MG: 250 INJECTION, POWDER, FOR SOLUTION INTRAMUSCULAR; INTRAVENOUS at 09:53:00

## 2018-09-21 NOTE — TELEPHONE ENCOUNTER
Received v/o from [address] [place] [address] for pt to come in for Rocephin 250mg IM and Azithro 1gm PO. Pt informed of positive gonorrhea and the need to inform partner and for partner to be treated. Assisted pt with scheduling Rocephin injection for today at 9am at [facility].  Pt emmett

## 2018-09-21 NOTE — PROGRESS NOTES
Patient in for her Rocephin injection per [facility]. Rocephin given on left upper outer gluteus, pt tolerated injection well, no further questions.

## 2018-09-25 ENCOUNTER — TELEPHONE (OUTPATIENT)
Dept: INTERNAL MEDICINE CLINIC | Facility: CLINIC | Age: 28
End: 2018-09-25

## 2018-09-28 ENCOUNTER — TELEPHONE (OUTPATIENT)
Dept: OBGYN CLINIC | Facility: CLINIC | Age: 28
End: 2018-09-28

## 2018-10-01 NOTE — TELEPHONE ENCOUNTER
Informed pt that her  Test for Trichomonas, HIV, Hep Panel and T Pallidum was negative from 9/19/18.

## 2018-10-04 ENCOUNTER — OFFICE VISIT (OUTPATIENT)
Dept: INTERNAL MEDICINE CLINIC | Facility: CLINIC | Age: 28
End: 2018-10-04

## 2018-10-04 VITALS
BODY MASS INDEX: 34.53 KG/M2 | OXYGEN SATURATION: 96 % | HEIGHT: 67 IN | RESPIRATION RATE: 19 BRPM | TEMPERATURE: 98 F | HEART RATE: 76 BPM | DIASTOLIC BLOOD PRESSURE: 82 MMHG | SYSTOLIC BLOOD PRESSURE: 130 MMHG | WEIGHT: 220 LBS

## 2018-10-04 DIAGNOSIS — G47.33 OSA ON CPAP: ICD-10-CM

## 2018-10-04 DIAGNOSIS — E66.01 MORBID OBESITY (HCC): ICD-10-CM

## 2018-10-04 DIAGNOSIS — G44.049 CHRONIC PAROXYSMAL HEMICRANIA, NOT INTRACTABLE: Primary | ICD-10-CM

## 2018-10-04 DIAGNOSIS — A54.9 GONORRHEA: ICD-10-CM

## 2018-10-04 DIAGNOSIS — Z99.89 OSA ON CPAP: ICD-10-CM

## 2018-10-04 PROCEDURE — 99214 OFFICE O/P EST MOD 30 MIN: CPT | Performed by: INTERNAL MEDICINE

## 2018-10-04 NOTE — PROGRESS NOTES
HPI:    Patient ID: Nicole President is a 25year old female. Headache    This is a chronic problem. Episode onset: > 2 years. The problem occurs intermittently (averaging 2 x each week). The pain is located in the temporal region.  The quality of the pain capsule Rfl: 1     Allergies:  Betadine [Povidone *    RASH   PHYSICAL EXAM:   Physical Exam   Nursing note and vitals reviewed. Constitutional: She is oriented to person, place, and time. She appears well-developed. No distress.    HENT:   Head: Normocep

## 2018-10-08 ENCOUNTER — TELEPHONE (OUTPATIENT)
Dept: SURGERY | Facility: CLINIC | Age: 28
End: 2018-10-08

## 2018-10-08 NOTE — TELEPHONE ENCOUNTER
I returned patient's voicemail requesting a call back to schedule appt with Dr. Daniella Castano. Patient was unavailable and I left a voicemail for her to call back at her convenience.

## 2018-10-10 ENCOUNTER — OFFICE VISIT (OUTPATIENT)
Dept: SURGERY | Facility: CLINIC | Age: 28
End: 2018-10-10

## 2018-10-10 VITALS
OXYGEN SATURATION: 100 % | DIASTOLIC BLOOD PRESSURE: 89 MMHG | SYSTOLIC BLOOD PRESSURE: 135 MMHG | HEART RATE: 87 BPM | WEIGHT: 223 LBS | RESPIRATION RATE: 18 BRPM | BODY MASS INDEX: 35 KG/M2 | HEIGHT: 67 IN

## 2018-10-10 DIAGNOSIS — E66.9 OBESITY (BMI 30-39.9): ICD-10-CM

## 2018-10-10 DIAGNOSIS — I10 ESSENTIAL HYPERTENSION: Primary | ICD-10-CM

## 2018-10-10 DIAGNOSIS — Z99.89 OSA ON CPAP: ICD-10-CM

## 2018-10-10 DIAGNOSIS — F43.9 STRESS: ICD-10-CM

## 2018-10-10 DIAGNOSIS — G47.33 OSA ON CPAP: ICD-10-CM

## 2018-10-10 DIAGNOSIS — R60.0 LOWER EXTREMITY EDEMA: ICD-10-CM

## 2018-10-10 DIAGNOSIS — Z51.81 ENCOUNTER FOR THERAPEUTIC DRUG MONITORING: ICD-10-CM

## 2018-10-10 PROCEDURE — 99214 OFFICE O/P EST MOD 30 MIN: CPT | Performed by: INTERNAL MEDICINE

## 2018-10-10 RX ORDER — HYDROCHLOROTHIAZIDE 12.5 MG/1
12.5 TABLET ORAL DAILY
Qty: 90 TABLET | Refills: 3 | Status: SHIPPED | OUTPATIENT
Start: 2018-10-10 | End: 2019-07-11

## 2018-10-10 RX ORDER — PHENTERMINE HYDROCHLORIDE 30 MG/1
30 CAPSULE ORAL EVERY MORNING
Qty: 30 CAPSULE | Refills: 2 | Status: SHIPPED | OUTPATIENT
Start: 2018-10-10 | End: 2019-01-10 | Stop reason: DRUGHIGH

## 2018-10-10 NOTE — PROGRESS NOTES
[address], [facility]  [facility] 91 [facility] [address]  Dept: [phone]     Date:   2017    Patient:  Kirsten Vallejo  :      1990  MRN:      [id number]    Chief Complaint: Years of education: Not on file      Highest education level: Not on file    Social Needs      Financial resource strain: Not on file      Food insecurity - worry: Not on file      Food insecurity - inability: Not on file      Transportation needs - medica 48oz  · Drinking between meals only:  yes  · Toughest challenge:  Exercise, cutting out carbs, ice cream cravings    Nutritional Goals  Limit carbohydrates to 100 gms per day, Eat 100-200 calories within 1 hour of waking  and Eat 3-4 cups of fresh fruits o Vitals reviewed. ASSESSMENT     OBSTRUCTIVE SLEEP APNEA: The patient states her sleep apnea has been stable since the last clinic visit. There has not been any increase in hyper-somnolence.      Encounter Diagnosis(ses):   Essential hypertension  (prim

## 2018-10-18 ENCOUNTER — OFFICE VISIT (OUTPATIENT)
Dept: OBGYN CLINIC | Facility: CLINIC | Age: 28
End: 2018-10-18

## 2018-10-18 VITALS — HEART RATE: 92 BPM | SYSTOLIC BLOOD PRESSURE: 107 MMHG | DIASTOLIC BLOOD PRESSURE: 72 MMHG

## 2018-10-18 DIAGNOSIS — Z20.2 EXPOSURE TO GONORRHEA: ICD-10-CM

## 2018-10-18 DIAGNOSIS — A64 STI (SEXUALLY TRANSMITTED INFECTION): Primary | ICD-10-CM

## 2018-10-18 PROCEDURE — 99213 OFFICE O/P EST LOW 20 MIN: CPT | Performed by: OBSTETRICS & GYNECOLOGY

## 2018-10-19 ENCOUNTER — TELEPHONE (OUTPATIENT)
Dept: OBGYN CLINIC | Facility: CLINIC | Age: 28
End: 2018-10-19

## 2018-10-19 NOTE — H&P
HPI:  The patient is a 17-year-old female who presents for repeat STD screen after testing positive for gonorrhea. She was treated with azithromycin and Rocephin. Her partner was tested and treated through the ER.   They have had sex since treatment witho Substance and Sexual Activity      Alcohol use:  Yes        Alcohol/week: 0.0 oz        Comment: SOCIALLY      Drug use: No      Sexual activity: Yes        Partners: Male        Birth control/protection: IUD, Paragard    Other Topics      Concerns: appearance, hair distribution, and no lesions  Urethral Meatus:  normal in size, location, without lesions and prolapse  Bladder:  No fullness, masses or tenderness  Vagina:  Normal appearance without lesions, no abnormal discharge  Cervix:  Normal without

## 2018-10-19 NOTE — TELEPHONE ENCOUNTER
----- Message from [place] April, DO sent at 10/19/2018 12:35 PM CDT -----  pls notify of results.   S/O needs XIANG before unprotected IC

## 2018-10-21 DIAGNOSIS — E55.9 VITAMIN D DEFICIENCY: ICD-10-CM

## 2018-10-22 RX ORDER — ERGOCALCIFEROL 1.25 MG/1
CAPSULE ORAL
Qty: 12 CAPSULE | Refills: 0 | Status: SHIPPED | OUTPATIENT
Start: 2018-10-22 | End: 2019-01-10

## 2018-12-17 ENCOUNTER — TELEPHONE (OUTPATIENT)
Dept: OBGYN CLINIC | Facility: CLINIC | Age: 28
End: 2018-12-17

## 2018-12-17 NOTE — TELEPHONE ENCOUNTER
Pt had IUD put in by Dr ortiz but has been seeing Dr Aaron Yanez , [address] states he told her to call when she wanted it taken out and he would , Please advise is it okay to book.

## 2018-12-18 NOTE — TELEPHONE ENCOUNTER
PT WANTS IUD REMOVED SO SHE CAN ATTEMPT CONCEPTION ONE MORE TIME. BOOKED APPT WITH [facility] FOR PARAGARD IUD REMOVAL ON 1-10-19.

## 2019-01-10 ENCOUNTER — OFFICE VISIT (OUTPATIENT)
Dept: SURGERY | Facility: CLINIC | Age: 29
End: 2019-01-10
Payer: COMMERCIAL

## 2019-01-10 ENCOUNTER — OFFICE VISIT (OUTPATIENT)
Dept: OBGYN CLINIC | Facility: CLINIC | Age: 29
End: 2019-01-10
Payer: COMMERCIAL

## 2019-01-10 VITALS
HEART RATE: 76 BPM | SYSTOLIC BLOOD PRESSURE: 137 MMHG | BODY MASS INDEX: 34.84 KG/M2 | RESPIRATION RATE: 16 BRPM | WEIGHT: 222 LBS | HEIGHT: 67 IN | OXYGEN SATURATION: 100 % | DIASTOLIC BLOOD PRESSURE: 91 MMHG

## 2019-01-10 VITALS
HEART RATE: 91 BPM | SYSTOLIC BLOOD PRESSURE: 128 MMHG | WEIGHT: 223 LBS | BODY MASS INDEX: 35 KG/M2 | DIASTOLIC BLOOD PRESSURE: 83 MMHG

## 2019-01-10 DIAGNOSIS — E66.9 OBESITY (BMI 30-39.9): ICD-10-CM

## 2019-01-10 DIAGNOSIS — Z51.81 ENCOUNTER FOR THERAPEUTIC DRUG MONITORING: ICD-10-CM

## 2019-01-10 DIAGNOSIS — Z30.432 ENCOUNTER FOR IUD REMOVAL: Primary | ICD-10-CM

## 2019-01-10 DIAGNOSIS — Z99.89 OSA ON CPAP: ICD-10-CM

## 2019-01-10 DIAGNOSIS — M17.12 PRIMARY OSTEOARTHRITIS OF LEFT KNEE: ICD-10-CM

## 2019-01-10 DIAGNOSIS — I10 ESSENTIAL HYPERTENSION: Primary | ICD-10-CM

## 2019-01-10 DIAGNOSIS — R60.0 LOWER EXTREMITY EDEMA: ICD-10-CM

## 2019-01-10 DIAGNOSIS — G47.33 OSA ON CPAP: ICD-10-CM

## 2019-01-10 DIAGNOSIS — E55.9 VITAMIN D DEFICIENCY: ICD-10-CM

## 2019-01-10 PROBLEM — E66.01 MORBID OBESITY (HCC): Status: RESOLVED | Noted: 2017-07-12 | Resolved: 2019-01-10

## 2019-01-10 PROCEDURE — 58301 REMOVE INTRAUTERINE DEVICE: CPT | Performed by: OBSTETRICS & GYNECOLOGY

## 2019-01-10 PROCEDURE — 99214 OFFICE O/P EST MOD 30 MIN: CPT | Performed by: INTERNAL MEDICINE

## 2019-01-10 RX ORDER — ERGOCALCIFEROL 1.25 MG/1
50000 CAPSULE ORAL WEEKLY
Qty: 12 CAPSULE | Refills: 2 | Status: SHIPPED | OUTPATIENT
Start: 2019-01-10 | End: 2020-09-10

## 2019-01-10 RX ORDER — PHENTERMINE HYDROCHLORIDE 37.5 MG/1
37.5 TABLET ORAL
Qty: 30 TABLET | Refills: 2 | Status: SHIPPED | OUTPATIENT
Start: 2019-01-10 | End: 2019-07-11

## 2019-01-10 NOTE — PROGRESS NOTES
[address], [facility]  [address] [facility] 91 [facility] [address]  Dept: [phone]     Date:   2017    Patient:  Janeth San  :      1990  MRN:      [id number]    Chief Complaint: Highest education level: Not on file    Social Needs      Financial resource strain: Not on file      Food insecurity - worry: Not on file      Food insecurity - inability: Not on file      Transportation needs - medical: Not on file      Transportation ne yes  · Toughest challenge:  Exercise, cutting out carbs, ice cream cravings    Nutritional Goals  Limit carbohydrates to 100 gms per day, Eat 100-200 calories within 1 hour of waking  and Eat 3-4 cups of fresh fruits or vegetables daily    Behavior Modific OBSTRUCTIVE SLEEP APNEA: The patient states her sleep apnea has been stable since the last clinic visit. There has not been any increase in hyper-somnolence.      Encounter Diagnosis(ses):   Essential hypertension  (primary encounter diagnosis)  Isiah on

## 2019-01-10 NOTE — PROCEDURES
IUD Removal     Consent signed. Procedure discussed with the patient in detail including indication, risks, benefits, alternatives and complications.     Pelvic Exam Findings:  Lesion description:  IUD strings seen from cervix    Procedure:  Speculum renu

## 2019-05-28 NOTE — TELEPHONE ENCOUNTER
Pt calling to report LMP 4/23 and states cycles are every 28 days. Pt stated she has missed a period but hasn't taken a pregnancy test yet. Pt advised to take UPT and call if positive so we can start [facility]. Pt verbalized understanding.

## 2019-07-11 ENCOUNTER — OFFICE VISIT (OUTPATIENT)
Dept: SURGERY | Facility: CLINIC | Age: 29
End: 2019-07-11
Payer: COMMERCIAL

## 2019-07-11 VITALS
HEART RATE: 100 BPM | DIASTOLIC BLOOD PRESSURE: 79 MMHG | WEIGHT: 241 LBS | BODY MASS INDEX: 37.83 KG/M2 | HEIGHT: 67 IN | SYSTOLIC BLOOD PRESSURE: 121 MMHG

## 2019-07-11 DIAGNOSIS — R60.0 LOWER EXTREMITY EDEMA: ICD-10-CM

## 2019-07-11 DIAGNOSIS — E66.9 OBESITY (BMI 30-39.9): ICD-10-CM

## 2019-07-11 DIAGNOSIS — R63.5 WEIGHT GAIN: ICD-10-CM

## 2019-07-11 DIAGNOSIS — Z99.89 OSA ON CPAP: Primary | ICD-10-CM

## 2019-07-11 DIAGNOSIS — G47.33 OSA ON CPAP: Primary | ICD-10-CM

## 2019-07-11 DIAGNOSIS — I10 ESSENTIAL HYPERTENSION: ICD-10-CM

## 2019-07-11 DIAGNOSIS — Z51.81 ENCOUNTER FOR THERAPEUTIC DRUG MONITORING: ICD-10-CM

## 2019-07-11 PROCEDURE — 99214 OFFICE O/P EST MOD 30 MIN: CPT | Performed by: INTERNAL MEDICINE

## 2019-07-11 RX ORDER — PHENTERMINE HYDROCHLORIDE 37.5 MG/1
37.5 TABLET ORAL
Qty: 30 TABLET | Refills: 2 | Status: SHIPPED | OUTPATIENT
Start: 2019-07-11 | End: 2019-10-09

## 2019-07-11 RX ORDER — HYDROCHLOROTHIAZIDE 12.5 MG/1
12.5 TABLET ORAL DAILY
Qty: 90 TABLET | Refills: 3 | Status: SHIPPED | OUTPATIENT
Start: 2019-07-11 | End: 2019-10-09

## 2019-07-11 NOTE — PROGRESS NOTES
[address], [facility]  [address] [facility] 91 [facility] [address]  Dept: [phone]     Date:   2017    Patient:  Eduardo Severs  :      1990  MRN:      [id number]    Chief Complaint: Highest education level: Not on file    Occupational History      Not on file    Social Needs      Financial resource strain: Not on file      Food insecurity:        Worry: Not on file        Inability: Not on file      Transportation needs:        Medi 8/2013     Family History:  History reviewed. No pertinent family history. Food Journal  · Reviewed and Discussed:       · Patient has a Food Journal?: no   · Patient is reading nutrition labels?   yes  · Average Caloric Intake:   Unknown  · Average CHO Negative. Neurological: Negative. Psychiatric/Behavioral: Negative. Physical Exam:   Physical Exam   Constitutional: She is oriented to person, place, and time. She appears well-developed and well-nourished.    HENT:   Head: Normocephalic and a

## 2019-07-24 ENCOUNTER — OFFICE VISIT (OUTPATIENT)
Dept: OBGYN CLINIC | Facility: CLINIC | Age: 29
End: 2019-07-24
Payer: COMMERCIAL

## 2019-07-24 ENCOUNTER — APPOINTMENT (OUTPATIENT)
Dept: LAB | Facility: HOSPITAL | Age: 29
End: 2019-07-24
Attending: INTERNAL MEDICINE
Payer: COMMERCIAL

## 2019-07-24 VITALS
BODY MASS INDEX: 38 KG/M2 | HEART RATE: 80 BPM | DIASTOLIC BLOOD PRESSURE: 76 MMHG | WEIGHT: 243 LBS | SYSTOLIC BLOOD PRESSURE: 110 MMHG

## 2019-07-24 DIAGNOSIS — I10 ESSENTIAL HYPERTENSION: ICD-10-CM

## 2019-07-24 DIAGNOSIS — E66.9 OBESITY (BMI 30-39.9): ICD-10-CM

## 2019-07-24 DIAGNOSIS — Z31.69 PRE-CONCEPTION COUNSELING: ICD-10-CM

## 2019-07-24 DIAGNOSIS — R63.5 WEIGHT GAIN: ICD-10-CM

## 2019-07-24 DIAGNOSIS — G47.33 OSA ON CPAP: ICD-10-CM

## 2019-07-24 DIAGNOSIS — Z01.419 WELL WOMAN EXAM: Primary | ICD-10-CM

## 2019-07-24 DIAGNOSIS — Z51.81 ENCOUNTER FOR THERAPEUTIC DRUG MONITORING: ICD-10-CM

## 2019-07-24 DIAGNOSIS — R60.0 LOWER EXTREMITY EDEMA: ICD-10-CM

## 2019-07-24 DIAGNOSIS — Z99.89 OSA ON CPAP: ICD-10-CM

## 2019-07-24 PROCEDURE — 93010 ELECTROCARDIOGRAM REPORT: CPT | Performed by: INTERNAL MEDICINE

## 2019-07-24 PROCEDURE — 99395 PREV VISIT EST AGE 18-39: CPT | Performed by: OBSTETRICS & GYNECOLOGY

## 2019-07-24 PROCEDURE — 93005 ELECTROCARDIOGRAM TRACING: CPT

## 2019-07-24 NOTE — H&P
HPI:  The patient is a 29 yo sexually active F here for WWE. NO GYN c/o. Monthly cycles since paragaurd removed. Did have 1 cycle 10 days late. Trying to conceive. Using menstrual hans to time cycles and IC.   Hasn't used OPKs and isn't taking PNV as Activity      Alcohol use:  Yes        Alcohol/week: 0.0 standard drinks        Comment: SOCIALLY      Drug use: No      Sexual activity: Yes        Partners: Male        Birth control/protection: IUD, Paragard    Lifestyle      Physical activity:        Da Cardiovascular:  denies chest pain or palpitations  Respiratory:  denies shortness of breath  Gastrointestinal:  denies heartburn, abdominal pain, diarrhea or constipation  Genitourinary:  denies dysuria, incontinence, abnormal vaginal discharge, vaginal discuss with bariatrician as these meds aren't encouraged with pregnancy. 3. Breast Health:     1. Reviewed monthly self breast exams with the patient   4. Discussed diet, exercise, VitD/Ca for Bone Health  5.  Follow up in 1 year for annual exam

## 2019-08-14 ENCOUNTER — APPOINTMENT (OUTPATIENT)
Dept: LAB | Facility: HOSPITAL | Age: 29
End: 2019-08-14
Attending: OBSTETRICS & GYNECOLOGY
Payer: COMMERCIAL

## 2019-08-14 ENCOUNTER — OFFICE VISIT (OUTPATIENT)
Dept: OBGYN CLINIC | Facility: CLINIC | Age: 29
End: 2019-08-14
Payer: COMMERCIAL

## 2019-08-14 ENCOUNTER — TELEPHONE (OUTPATIENT)
Dept: OBGYN CLINIC | Facility: CLINIC | Age: 29
End: 2019-08-14

## 2019-08-14 VITALS
SYSTOLIC BLOOD PRESSURE: 123 MMHG | BODY MASS INDEX: 39 KG/M2 | DIASTOLIC BLOOD PRESSURE: 80 MMHG | HEART RATE: 96 BPM | WEIGHT: 252 LBS

## 2019-08-14 DIAGNOSIS — N92.6 MISSED MENSES: Primary | ICD-10-CM

## 2019-08-14 DIAGNOSIS — N92.6 MISSED MENSES: ICD-10-CM

## 2019-08-14 LAB — HCG SERPL QL: NEGATIVE

## 2019-08-14 PROCEDURE — 36415 COLL VENOUS BLD VENIPUNCTURE: CPT

## 2019-08-14 PROCEDURE — 84703 CHORIONIC GONADOTROPIN ASSAY: CPT

## 2019-08-14 PROCEDURE — 99212 OFFICE O/P EST SF 10 MIN: CPT | Performed by: OBSTETRICS & GYNECOLOGY

## 2019-08-18 NOTE — H&P
HPI:  The patient is a 12-year-old sexually active female who presents for evaluation of missed menses. Last menstrual period 6/20/2018. Is taken multiple urine pregnancy tests that have been negative. Actively trying to conceive.   Had physical exam wit Activity      Alcohol use:  Yes        Alcohol/week: 0.0 standard drinks        Comment: SOCIALLY      Drug use: No      Sexual activity: Yes        Partners: Male        Birth control/protection: IUD, Paragard    Lifestyle      Physical activity:        Da Cardiovascular:  denies chest pain or palpitations  Respiratory:  denies shortness of breath  Gastrointestinal:  denies heartburn, abdominal pain, diarrhea or constipation  Genitourinary:  denies dysuria, incontinence, abnormal vaginal discharge, vaginal

## 2019-10-09 ENCOUNTER — OFFICE VISIT (OUTPATIENT)
Dept: SURGERY | Facility: CLINIC | Age: 29
End: 2019-10-09
Payer: COMMERCIAL

## 2019-10-09 ENCOUNTER — APPOINTMENT (OUTPATIENT)
Dept: LAB | Facility: HOSPITAL | Age: 29
End: 2019-10-09
Attending: INTERNAL MEDICINE
Payer: COMMERCIAL

## 2019-10-09 VITALS
HEART RATE: 99 BPM | DIASTOLIC BLOOD PRESSURE: 88 MMHG | BODY MASS INDEX: 41.44 KG/M2 | OXYGEN SATURATION: 97 % | SYSTOLIC BLOOD PRESSURE: 152 MMHG | HEIGHT: 67 IN | WEIGHT: 264 LBS

## 2019-10-09 DIAGNOSIS — R60.0 LOWER EXTREMITY EDEMA: ICD-10-CM

## 2019-10-09 DIAGNOSIS — E55.9 VITAMIN D DEFICIENCY: ICD-10-CM

## 2019-10-09 DIAGNOSIS — Z51.81 ENCOUNTER FOR THERAPEUTIC DRUG MONITORING: ICD-10-CM

## 2019-10-09 DIAGNOSIS — Z99.89 OSA ON CPAP: ICD-10-CM

## 2019-10-09 DIAGNOSIS — M17.12 PRIMARY OSTEOARTHRITIS OF LEFT KNEE: ICD-10-CM

## 2019-10-09 DIAGNOSIS — G47.33 OSA ON CPAP: ICD-10-CM

## 2019-10-09 DIAGNOSIS — N92.6 IRREGULAR PERIODS: ICD-10-CM

## 2019-10-09 DIAGNOSIS — E66.01 MORBID OBESITY WITH BMI OF 40.0-44.9, ADULT (HCC): ICD-10-CM

## 2019-10-09 DIAGNOSIS — R73.09 ABNORMAL BLOOD SUGAR: ICD-10-CM

## 2019-10-09 DIAGNOSIS — I10 ESSENTIAL HYPERTENSION: Primary | ICD-10-CM

## 2019-10-09 DIAGNOSIS — I10 ESSENTIAL HYPERTENSION: ICD-10-CM

## 2019-10-09 PROCEDURE — 36415 COLL VENOUS BLD VENIPUNCTURE: CPT

## 2019-10-09 PROCEDURE — 85027 COMPLETE CBC AUTOMATED: CPT

## 2019-10-09 PROCEDURE — 82746 ASSAY OF FOLIC ACID SERUM: CPT

## 2019-10-09 PROCEDURE — 99214 OFFICE O/P EST MOD 30 MIN: CPT | Performed by: INTERNAL MEDICINE

## 2019-10-09 PROCEDURE — 84466 ASSAY OF TRANSFERRIN: CPT

## 2019-10-09 PROCEDURE — 83036 HEMOGLOBIN GLYCOSYLATED A1C: CPT

## 2019-10-09 PROCEDURE — 81025 URINE PREGNANCY TEST: CPT

## 2019-10-09 PROCEDURE — 80053 COMPREHEN METABOLIC PANEL: CPT

## 2019-10-09 PROCEDURE — 84443 ASSAY THYROID STIM HORMONE: CPT

## 2019-10-09 PROCEDURE — 84425 ASSAY OF VITAMIN B-1: CPT

## 2019-10-09 PROCEDURE — 83540 ASSAY OF IRON: CPT

## 2019-10-09 PROCEDURE — 82728 ASSAY OF FERRITIN: CPT

## 2019-10-09 PROCEDURE — 82607 VITAMIN B-12: CPT

## 2019-10-09 RX ORDER — PHENTERMINE HYDROCHLORIDE 37.5 MG/1
37.5 TABLET ORAL
Qty: 30 TABLET | Refills: 2 | Status: SHIPPED | OUTPATIENT
Start: 2019-10-09 | End: 2020-04-09

## 2019-10-09 RX ORDER — HYDROCHLOROTHIAZIDE 12.5 MG/1
12.5 TABLET ORAL DAILY
Qty: 90 TABLET | Refills: 3 | Status: SHIPPED | OUTPATIENT
Start: 2019-10-09 | End: 2020-10-03

## 2019-10-09 NOTE — PROGRESS NOTES
[address] 91 [facility] [address]  Dept: [phone]     Date:   2017    Patient:  Hira Males  :      1990  MRN:      [id number]    Chief Complaint: file      Years of education: Not on file      Highest education level: Not on file    Occupational History      Not on file    Social Needs      Financial resource strain: Not on file      Food insecurity:        Worry: Not on file        Inability: Not o W/UPPER ADJACENT VAGINA; W/BIOPSY(S), CERVIX      colpo - 8/2013     Family History:  History reviewed. No pertinent family history. Food Journal  · Reviewed and Discussed:       · Patient has a Food Journal?: no   · Patient is reading nutrition labels? for arthralgias, back pain and gait problem. Skin: Negative. Neurological: Negative. Psychiatric/Behavioral: Negative. Physical Exam:   Physical Exam   Constitutional: She is oriented to person, place, and time.  She appears well-developed an work    No mensus since Aug 26,2019  Will check urine HCG    Aware she should not start phentermine until pregnancy test negative    Still family planning.  Aware to stop phentermine if pregnancy occurs    Orders Placed This Encounter      Pregnancy Test, U

## 2019-10-10 ENCOUNTER — OFFICE VISIT (OUTPATIENT)
Dept: INTERNAL MEDICINE CLINIC | Facility: CLINIC | Age: 29
End: 2019-10-10
Payer: COMMERCIAL

## 2019-10-10 VITALS
BODY MASS INDEX: 40.02 KG/M2 | WEIGHT: 255 LBS | HEART RATE: 88 BPM | SYSTOLIC BLOOD PRESSURE: 130 MMHG | OXYGEN SATURATION: 98 % | DIASTOLIC BLOOD PRESSURE: 70 MMHG | RESPIRATION RATE: 16 BRPM | HEIGHT: 67 IN

## 2019-10-10 DIAGNOSIS — G43.C0 PERIODIC HEADACHE SYNDROME, NOT INTRACTABLE: Primary | ICD-10-CM

## 2019-10-10 DIAGNOSIS — E61.1 IRON DEFICIENCY: ICD-10-CM

## 2019-10-10 DIAGNOSIS — N92.6 MENSTRUAL DISORDER: ICD-10-CM

## 2019-10-10 DIAGNOSIS — Z99.89 OSA ON CPAP: ICD-10-CM

## 2019-10-10 DIAGNOSIS — I10 ESSENTIAL HYPERTENSION: ICD-10-CM

## 2019-10-10 DIAGNOSIS — G47.33 OSA ON CPAP: ICD-10-CM

## 2019-10-10 DIAGNOSIS — E66.9 OBESITY (BMI 30-39.9): ICD-10-CM

## 2019-10-10 PROCEDURE — 99214 OFFICE O/P EST MOD 30 MIN: CPT | Performed by: INTERNAL MEDICINE

## 2019-10-10 RX ORDER — DOXYCYCLINE HYCLATE 100 MG/1
CAPSULE ORAL
COMMUNITY
End: 2019-10-11 | Stop reason: ALTCHOICE

## 2019-10-10 NOTE — PROGRESS NOTES
HPI:    Patient ID: Eduardo Severs is a 30year old female. HPI  Patient is here for completion of FMLA form . She has migraine headache intermittently. Headache is not intractable  . Unable to stay in focus during flare. Rare nausea, vomiting.   She wor AVERAGE GLUCOSE      68 - 126 mg/dL 108   HCG URINE QUALITATIVE      Negative Negative   Vitamin B12      193 - 986 pg/mL 1,111 (H)   FOLATE (FOLIC ACID), SERUM      >=8.7 ng/mL 18.0   FERRITIN      12.0 - 160.0 ng/mL 78.7   TSH      0.358 - 3.740 mIU/mL 0 normal. No respiratory distress. Abdominal: Soft. Bowel sounds are normal. There is no hepatosplenomegaly. There is no tenderness. Musculoskeletal: Normal range of motion. Neurological: She is alert and oriented to person, place, and time.  She displa

## 2019-10-11 RX ORDER — FERROUS SULFATE 325(65) MG
325 TABLET ORAL
Qty: 90 TABLET | Refills: 0 | Status: SHIPPED | OUTPATIENT
Start: 2019-10-11 | End: 2020-10-09

## 2019-11-06 ENCOUNTER — OFFICE VISIT (OUTPATIENT)
Dept: SURGERY | Facility: CLINIC | Age: 29
End: 2019-11-06
Payer: COMMERCIAL

## 2019-11-06 VITALS — HEIGHT: 67 IN | WEIGHT: 250.88 LBS | BODY MASS INDEX: 39.38 KG/M2

## 2019-11-06 DIAGNOSIS — E66.09 CLASS 2 OBESITY DUE TO EXCESS CALORIES WITH BODY MASS INDEX (BMI) OF 39.0 TO 39.9 IN ADULT, UNSPECIFIED WHETHER SERIOUS COMORBIDITY PRESENT: Primary | ICD-10-CM

## 2019-11-06 DIAGNOSIS — E66.9 OBESITY (BMI 30-39.9): Primary | ICD-10-CM

## 2019-11-06 PROCEDURE — 97803 MED NUTRITION INDIV SUBSEQ: CPT | Performed by: HEALTH EDUCATOR

## 2019-11-06 NOTE — PROGRESS NOTES
[facility]  [address] [place] [address]  Dept: [phone]  Loc: [phone]    11/06/19      Bariatric Follow-up Nutrition Session    Rey Rayo is a 30year old female.      Tamara Date    B12 1,111 (H) 10/09/2019     Lab Results   Component Value Date    IZTT38YJ 19.0 04/18/2018     Lab Results   Component Value Date/Time    THIAMINE 145 10/09/2019 02:56 PM      No results found for: VITB1  Lab Results   Component Value Date/Time walks    Other:  Pt here for f/u after last RD visit 5/2/18. Pt had success with weight loss last year as a result of healthier food choices and regular exercise with  3x/week.  Says she was motivated to exercise when single but lost motivat

## 2019-11-14 RX ORDER — HYDROCHLOROTHIAZIDE 12.5 MG/1
TABLET ORAL
Qty: 90 TABLET | Refills: 3 | Status: SHIPPED | OUTPATIENT
Start: 2019-11-14 | End: 2020-01-09

## 2020-01-09 ENCOUNTER — OFFICE VISIT (OUTPATIENT)
Dept: OBGYN CLINIC | Facility: CLINIC | Age: 30
End: 2020-01-09
Payer: COMMERCIAL

## 2020-01-09 VITALS
DIASTOLIC BLOOD PRESSURE: 80 MMHG | HEART RATE: 88 BPM | WEIGHT: 261.63 LBS | SYSTOLIC BLOOD PRESSURE: 123 MMHG | BODY MASS INDEX: 41 KG/M2

## 2020-01-09 DIAGNOSIS — N90.89 LABIAL LESION: ICD-10-CM

## 2020-01-09 DIAGNOSIS — L70.0 COMEDONE: Primary | ICD-10-CM

## 2020-01-09 PROCEDURE — 99213 OFFICE O/P EST LOW 20 MIN: CPT | Performed by: OBSTETRICS & GYNECOLOGY

## 2020-01-09 NOTE — H&P
HPI:  The patient is a 42-year-old sexually active female who presents for evaluation of lesion on her right labia that she is noticed for the last month. Not painful no drainage.       Reviewed medical and surgical history below       OBSTETRICS HISTORY: Sexual activity: Yes        Partners: Male        Birth control/protection: I.U.D., Paragard    Lifestyle      Physical activity:        Days per week: Not on file        Minutes per session: Not on file      Stress: Not on file    Relationships      Soc Systems:  Constitutional:  Denies fevers and chills   Cardiovascular:  denies chest pain or palpitations  Respiratory:  denies shortness of breath  Gastrointestinal:  denies heartburn, abdominal pain, diarrhea or constipation  Genitourinary:  See HPI  Musc

## 2020-01-27 ENCOUNTER — HOSPITAL ENCOUNTER (OUTPATIENT)
Age: 30
Discharge: HOME | End: 2020-01-27
Attending: FAMILY MEDICINE
Payer: COMMERCIAL

## 2020-01-27 ENCOUNTER — APPOINTMENT (OUTPATIENT)
Dept: GENERAL RADIOLOGY | Age: 30
End: 2020-01-27
Attending: FAMILY MEDICINE
Payer: COMMERCIAL

## 2020-01-27 VITALS
DIASTOLIC BLOOD PRESSURE: 76 MMHG | HEIGHT: 64 IN | RESPIRATION RATE: 20 BRPM | OXYGEN SATURATION: 100 % | BODY MASS INDEX: 45 KG/M2 | HEART RATE: 92 BPM | SYSTOLIC BLOOD PRESSURE: 139 MMHG | TEMPERATURE: 99 F

## 2020-01-27 DIAGNOSIS — S92.525A CLOSED NONDISPLACED FRACTURE OF MIDDLE PHALANX OF LESSER TOE OF LEFT FOOT, INITIAL ENCOUNTER: Primary | ICD-10-CM

## 2020-01-27 PROCEDURE — 99213 OFFICE O/P EST LOW 20 MIN: CPT

## 2020-01-27 PROCEDURE — 73630 X-RAY EXAM OF FOOT: CPT | Performed by: FAMILY MEDICINE

## 2020-01-27 PROCEDURE — 28510 TREATMENT OF TOE FRACTURE: CPT

## 2020-01-27 RX ORDER — IBUPROFEN 600 MG/1
600 TABLET ORAL ONCE
Status: COMPLETED | OUTPATIENT
Start: 2020-01-27 | End: 2020-01-27

## 2020-01-28 NOTE — ED PROVIDER NOTES
Patient Seen in: [facility]    History   Patient presents with:  Contusion    Stated Complaint: LT TOE INJURY     HPI    HPI: Isa Gomez is a 30year old female who presents after an injury to the left foot that occur Drug use: No      Review of Systems    Positive for stated complaint: LT TOE INJURY   Other systems are as noted in HPI. Constitutional and vital signs reviewed. All other systems reviewed and negative except as noted above.     PSFH elements reviewe encounter diagnosis)    Disposition:  Discharge    Follow-up:  Shyla Iqbal MD  [facility] 13 [phone]    In 2 days        Medications Prescribed:  Current Discharge Medication List

## 2020-02-03 ENCOUNTER — OFFICE VISIT (OUTPATIENT)
Dept: INTERNAL MEDICINE CLINIC | Facility: CLINIC | Age: 30
End: 2020-02-03
Payer: COMMERCIAL

## 2020-02-03 VITALS
RESPIRATION RATE: 22 BRPM | HEART RATE: 87 BPM | OXYGEN SATURATION: 100 % | WEIGHT: 267 LBS | SYSTOLIC BLOOD PRESSURE: 122 MMHG | BODY MASS INDEX: 45.58 KG/M2 | HEIGHT: 64 IN | DIASTOLIC BLOOD PRESSURE: 70 MMHG

## 2020-02-03 DIAGNOSIS — S92.505A CLOSED NONDISPLACED FRACTURE OF PHALANX OF LESSER TOE OF LEFT FOOT, UNSPECIFIED PHALANX, INITIAL ENCOUNTER: Primary | ICD-10-CM

## 2020-02-03 DIAGNOSIS — E66.01 MORBID OBESITY WITH BMI OF 45.0-49.9, ADULT (HCC): ICD-10-CM

## 2020-02-03 DIAGNOSIS — Z28.21 REFUSED INFLUENZA VACCINE: ICD-10-CM

## 2020-02-03 DIAGNOSIS — L73.2 HIDRADENITIS AXILLARIS: ICD-10-CM

## 2020-02-03 PROCEDURE — 99214 OFFICE O/P EST MOD 30 MIN: CPT | Performed by: INTERNAL MEDICINE

## 2020-02-03 RX ORDER — AMOXICILLIN AND CLAVULANATE POTASSIUM 875; 125 MG/1; MG/1
1 TABLET, FILM COATED ORAL 2 TIMES DAILY
Qty: 20 TABLET | Refills: 0 | Status: SHIPPED | OUTPATIENT
Start: 2020-02-03 | End: 2020-02-13

## 2020-02-03 NOTE — PROGRESS NOTES
HPI:    Patient ID: Shelli Ventura is a 30year old female. HPI     Patient had left foot injury at work on 1/27/20. A metal door closed on the 3rd  and  5th toes. She was seen at  [facility]. Pain is worst with weight bearing.  She had applied ice and took Ibup Psychiatric/Behavioral: Negative for behavioral problems. Current Outpatient Medications   Medication Sig Dispense Refill   • Amoxicillin-Pot Clavulanate 875-125 MG Oral Tab Take 1 tablet by mouth 2 (two) times daily for 10 days.  20 tablet 0 axillaris  Avoid deodorant for now. Clean axilla with soap and water  Augmentin 875 mgs BID x 10 days  Weight loss beneficial  Surgical consult if worsen. Consider laser hair removal in the future.      3. Morbid obesity with BMI of 45.0-49.9, adult ([address]

## 2020-02-18 ENCOUNTER — OFFICE VISIT (OUTPATIENT)
Dept: INTERNAL MEDICINE CLINIC | Facility: CLINIC | Age: 30
End: 2020-02-18
Payer: COMMERCIAL

## 2020-02-18 VITALS
SYSTOLIC BLOOD PRESSURE: 114 MMHG | TEMPERATURE: 99 F | HEIGHT: 64 IN | WEIGHT: 264.38 LBS | BODY MASS INDEX: 45.14 KG/M2 | HEART RATE: 93 BPM | DIASTOLIC BLOOD PRESSURE: 79 MMHG

## 2020-02-18 DIAGNOSIS — J10.1 INFLUENZA A: Primary | ICD-10-CM

## 2020-02-18 LAB
FLUAV + FLUBV RNA SPEC NAA+PROBE: NEGATIVE
FLUAV + FLUBV RNA SPEC NAA+PROBE: NEGATIVE
FLUAV + FLUBV RNA SPEC NAA+PROBE: POSITIVE

## 2020-02-18 PROCEDURE — 87631 RESP VIRUS 3-5 TARGETS: CPT | Performed by: INTERNAL MEDICINE

## 2020-02-18 PROCEDURE — 99213 OFFICE O/P EST LOW 20 MIN: CPT | Performed by: INTERNAL MEDICINE

## 2020-02-18 RX ORDER — GUAIFENESIN 600 MG
1200 TABLET, EXTENDED RELEASE 12 HR ORAL 2 TIMES DAILY
Qty: 14 TABLET | Refills: 0 | Status: SHIPPED | OUTPATIENT
Start: 2020-02-18 | End: 2020-03-19 | Stop reason: ALTCHOICE

## 2020-02-18 RX ORDER — OSELTAMIVIR PHOSPHATE 75 MG/1
75 CAPSULE ORAL 2 TIMES DAILY
Qty: 10 CAPSULE | Refills: 0 | Status: SHIPPED | OUTPATIENT
Start: 2020-02-18 | End: 2020-03-19 | Stop reason: ALTCHOICE

## 2020-02-18 NOTE — PROGRESS NOTES
HPI:    Patient ID: Hira Males is a 26year old female. HPI   Patient had returned from [place]. C/o nasal congestion, productive cough of thick white phlegm, generalized malaise, , body ache, poor appetite, low grade fever for 2-3 days.   Multiple sic Left Ear: Tympanic membrane normal.   Mild pharyngeal erythema    Eyes: Pupils are equal, round, and reactive to light. Conjunctivae and EOM are normal.   Neck: Normal range of motion. Neck supple. No JVD present.    Cardiovascular: Normal rate, regular r

## 2020-02-26 ENCOUNTER — OFFICE VISIT (OUTPATIENT)
Dept: OBGYN CLINIC | Facility: CLINIC | Age: 30
End: 2020-02-26
Payer: COMMERCIAL

## 2020-02-26 ENCOUNTER — APPOINTMENT (OUTPATIENT)
Dept: LAB | Facility: HOSPITAL | Age: 30
End: 2020-02-26
Attending: OBSTETRICS & GYNECOLOGY
Payer: COMMERCIAL

## 2020-02-26 VITALS
WEIGHT: 263.38 LBS | SYSTOLIC BLOOD PRESSURE: 113 MMHG | BODY MASS INDEX: 45 KG/M2 | DIASTOLIC BLOOD PRESSURE: 76 MMHG | HEART RATE: 90 BPM

## 2020-02-26 DIAGNOSIS — N97.9 FEMALE INFERTILITY: ICD-10-CM

## 2020-02-26 DIAGNOSIS — N92.6 IRREGULAR MENSES: Primary | ICD-10-CM

## 2020-02-26 DIAGNOSIS — N92.6 IRREGULAR MENSES: ICD-10-CM

## 2020-02-26 LAB
CONTROL LINE PRESENT WITH A CLEAR BACKGROUND (YES/NO): YES YES/NO
DHEA-S SERPL-MCNC: 269.7 UG/DL
ESTRADIOL SERPL-MCNC: 45.7 PG/ML
FSH SERPL-ACNC: 7.2 MIU/ML
PROLACTIN SERPL-MCNC: 6.2 NG/ML
TSI SER-ACNC: 0.86 MIU/ML (ref 0.36–3.74)

## 2020-02-26 PROCEDURE — 83498 ASY HYDROXYPROGESTERONE 17-D: CPT

## 2020-02-26 PROCEDURE — 36415 COLL VENOUS BLD VENIPUNCTURE: CPT

## 2020-02-26 PROCEDURE — 83001 ASSAY OF GONADOTROPIN (FSH): CPT

## 2020-02-26 PROCEDURE — 84146 ASSAY OF PROLACTIN: CPT

## 2020-02-26 PROCEDURE — 82627 DEHYDROEPIANDROSTERONE: CPT

## 2020-02-26 PROCEDURE — 99213 OFFICE O/P EST LOW 20 MIN: CPT | Performed by: OBSTETRICS & GYNECOLOGY

## 2020-02-26 PROCEDURE — 84443 ASSAY THYROID STIM HORMONE: CPT

## 2020-02-26 PROCEDURE — 82670 ASSAY OF TOTAL ESTRADIOL: CPT

## 2020-02-26 PROCEDURE — 84403 ASSAY OF TOTAL TESTOSTERONE: CPT

## 2020-02-26 PROCEDURE — 84402 ASSAY OF FREE TESTOSTERONE: CPT

## 2020-02-26 PROCEDURE — 81025 URINE PREGNANCY TEST: CPT | Performed by: OBSTETRICS & GYNECOLOGY

## 2020-02-26 NOTE — H&P
HPI:  The patient is a 41-year-old -1-1-2 who presents to discuss infertility. Patient and her  have been trying to conceive for the last 14 months without success. They have been having unprotected vaginal intercourse.   Patient states that h file    Tobacco Use      Smoking status: Never Smoker      Smokeless tobacco: Never Used    Substance and Sexual Activity      Alcohol use:  Yes        Alcohol/week: 0.0 standard drinks        Comment: SOCIALLY      Drug use: No      Sexual activity: Yes total) by mouth daily with breakfast., Disp: 90 tablet, Rfl: 0  •  Phentermine HCl 37.5 MG Oral Tab, Take 1 tablet (37.5 mg total) by mouth every morning before breakfast., Disp: 30 tablet, Rfl: 2  •  hydrochlorothiazide 12.5 MG Oral Tab, Take 1 tablet (12 needs hormone testing to evaluate first prior to fertility work-up. She is taking prenatal vitamins and I have encouraged her to lose weight as she is obese.   We did explain that the obesity can be contributing to her lack of menses and also affect her ab

## 2020-02-28 LAB
17-HYDROPROGESTERONE QNT: 33.73 NG/DL
TESTOSTERONE, FREE, S: 0.81 NG/DL
TESTOSTERONE, TOTAL, S: 31 NG/DL

## 2020-03-02 ENCOUNTER — TELEPHONE (OUTPATIENT)
Dept: OBGYN CLINIC | Facility: CLINIC | Age: 30
End: 2020-03-02

## 2020-03-02 ENCOUNTER — OFFICE VISIT (OUTPATIENT)
Dept: OBGYN CLINIC | Facility: CLINIC | Age: 30
End: 2020-03-02
Payer: COMMERCIAL

## 2020-03-02 VITALS
SYSTOLIC BLOOD PRESSURE: 112 MMHG | WEIGHT: 264 LBS | DIASTOLIC BLOOD PRESSURE: 77 MMHG | HEART RATE: 88 BPM | BODY MASS INDEX: 45 KG/M2

## 2020-03-02 DIAGNOSIS — N92.6 IRREGULAR MENSES: ICD-10-CM

## 2020-03-02 DIAGNOSIS — Z32.00 PREGNANCY EXAMINATION OR TEST, PREGNANCY UNCONFIRMED: ICD-10-CM

## 2020-03-02 DIAGNOSIS — N97.9 FEMALE FERTILITY PROBLEM: ICD-10-CM

## 2020-03-02 DIAGNOSIS — Z71.2 ENCOUNTER TO DISCUSS TEST RESULTS: Primary | ICD-10-CM

## 2020-03-02 LAB
CONTROL LINE PRESENT WITH A CLEAR BACKGROUND (YES/NO): YES YES/NO
PREGNANCY TEST, URINE: NEGATIVE

## 2020-03-02 PROCEDURE — 81025 URINE PREGNANCY TEST: CPT | Performed by: OBSTETRICS & GYNECOLOGY

## 2020-03-02 PROCEDURE — 99213 OFFICE O/P EST LOW 20 MIN: CPT | Performed by: OBSTETRICS & GYNECOLOGY

## 2020-03-02 RX ORDER — MEDROXYPROGESTERONE ACETATE 10 MG/1
10 TABLET ORAL DAILY
Qty: 10 TABLET | Refills: 0 | Status: SHIPPED | OUTPATIENT
Start: 2020-03-02 | End: 2020-03-12

## 2020-03-02 NOTE — TELEPHONE ENCOUNTER
Notified pt of Xintu Shuju message below. Pt states her LMP is 1/10/20. Pt confirmed with OK she uses to track cycles. Advised if LMP 1/10 then pt to follow plan as discussed with KEYLA in clinic. Routed to Xintu Shuju for sign off.

## 2020-03-02 NOTE — H&P
HPI:  The patient is a 33-year-old -1-1-2 with LMP 2020 who presents to review PCOS lab work-up. Patient states that last year she only had 5 menstrual cycles. We checked hormone testing to determine if she may have polycystic ovarian syndrome. Smoker      Smokeless tobacco: Never Used    Substance and Sexual Activity      Alcohol use:  Yes        Alcohol/week: 0.0 standard drinks        Comment: SOCIALLY      Drug use: No      Sexual activity: Yes        Partners: Male        Birth control/protec 325 (65 Fe) MG Oral Tab, Take 1 tablet (325 mg total) by mouth daily with breakfast., Disp: 90 tablet, Rfl: 0  •  Phentermine HCl 37.5 MG Oral Tab, Take 1 tablet (37.5 mg total) by mouth every morning before breakfast., Disp: 30 tablet, Rfl: 2  •  hydrochl analysis. I did discuss with her my concerns that her weight may be contributing to her infrequent menses and this may also contribute to her infertility issues. She voices understanding of this. All questions answered and patient voiced understanding.

## 2020-03-02 NOTE — TELEPHONE ENCOUNTER
Please call and let [facility] know I didn't realize LMP was 2/20/20. Needs to wait 1 month from menses to take provera if menses doesn't start spontaneously; she likely wont have much of a withdrawal bleeding due to recent menses.   To call on day 1 of cycle

## 2020-03-19 ENCOUNTER — OFFICE VISIT (OUTPATIENT)
Dept: INTERNAL MEDICINE CLINIC | Facility: CLINIC | Age: 30
End: 2020-03-19
Payer: COMMERCIAL

## 2020-03-19 VITALS
DIASTOLIC BLOOD PRESSURE: 80 MMHG | WEIGHT: 262 LBS | TEMPERATURE: 98 F | HEIGHT: 64 IN | SYSTOLIC BLOOD PRESSURE: 110 MMHG | HEART RATE: 97 BPM | RESPIRATION RATE: 19 BRPM | OXYGEN SATURATION: 99 % | BODY MASS INDEX: 44.73 KG/M2

## 2020-03-19 DIAGNOSIS — I10 ESSENTIAL HYPERTENSION: ICD-10-CM

## 2020-03-19 DIAGNOSIS — S92.525S: ICD-10-CM

## 2020-03-19 DIAGNOSIS — Z99.89 OSA ON CPAP: ICD-10-CM

## 2020-03-19 DIAGNOSIS — G47.33 OSA ON CPAP: ICD-10-CM

## 2020-03-19 DIAGNOSIS — R51.9 HEADACHE DISORDER: Primary | ICD-10-CM

## 2020-03-19 PROBLEM — R06.83 SNORING: Status: RESOLVED | Noted: 2017-10-26 | Resolved: 2020-03-19

## 2020-03-19 PROCEDURE — 99213 OFFICE O/P EST LOW 20 MIN: CPT | Performed by: INTERNAL MEDICINE

## 2020-03-19 NOTE — PROGRESS NOTES
HPI:    Patient ID: Ramy Garcia is a 26year old female. HPI     Patient is her for f/u migraine and tension headache and requesting update of her FMLA , but did not bring the form. Headache for > 3 yrs. . Headache is localized around bitemporal a MG Oral Tab Take 1 tablet (12.5 mg total) by mouth daily. 90 tablet 3   • ergocalciferol 75394 units Oral Cap Take 1 capsule (50,000 Units total) by mouth once a week.  12 capsule 2     Allergies:  Betadine [Povidone *    RASH   PHYSICAL EXAM:   Physical Ex

## 2020-04-02 ENCOUNTER — VIRTUAL PHONE E/M (OUTPATIENT)
Dept: INTERNAL MEDICINE CLINIC | Facility: CLINIC | Age: 30
End: 2020-04-02
Payer: COMMERCIAL

## 2020-04-02 DIAGNOSIS — J30.1 SEASONAL ALLERGIC RHINITIS DUE TO POLLEN: ICD-10-CM

## 2020-04-02 DIAGNOSIS — J45.20 MILD INTERMITTENT REACTIVE AIRWAY DISEASE WITHOUT COMPLICATION: Primary | ICD-10-CM

## 2020-04-02 PROCEDURE — 99212 OFFICE O/P EST SF 10 MIN: CPT | Performed by: INTERNAL MEDICINE

## 2020-04-02 RX ORDER — ALBUTEROL SULFATE 90 UG/1
2 AEROSOL, METERED RESPIRATORY (INHALATION) EVERY 6 HOURS PRN
Qty: 1 INHALER | Refills: 2 | Status: SHIPPED | OUTPATIENT
Start: 2020-04-02 | End: 2020-10-28

## 2020-04-02 RX ORDER — LEVOCETIRIZINE DIHYDROCHLORIDE 5 MG/1
5 TABLET, FILM COATED ORAL EVERY EVENING
Qty: 90 TABLET | Refills: 1 | Status: SHIPPED | OUTPATIENT
Start: 2020-04-02 | End: 2020-10-03

## 2020-04-02 NOTE — PROGRESS NOTES
Virtual/Telephone Check-In    Michael Gardiner verbally consents to a [employer] on 04/02/20. Patient understands and accepts financial responsibility for any deductible, co-insurance and/or co-pays associated with this service.     D

## 2020-04-09 RX ORDER — PHENTERMINE HYDROCHLORIDE 37.5 MG/1
37.5 TABLET ORAL
Qty: 30 TABLET | Refills: 0 | Status: SHIPPED | OUTPATIENT
Start: 2020-04-09 | End: 2020-04-16

## 2020-04-16 ENCOUNTER — VIRTUAL PHONE E/M (OUTPATIENT)
Dept: SURGERY | Facility: CLINIC | Age: 30
End: 2020-04-16
Payer: COMMERCIAL

## 2020-04-16 DIAGNOSIS — E66.9 OBESITY (BMI 30-39.9): ICD-10-CM

## 2020-04-16 DIAGNOSIS — I10 ESSENTIAL HYPERTENSION: ICD-10-CM

## 2020-04-16 DIAGNOSIS — Z51.81 ENCOUNTER FOR THERAPEUTIC DRUG MONITORING: ICD-10-CM

## 2020-04-16 DIAGNOSIS — R63.2 INCREASED APPETITE: Primary | ICD-10-CM

## 2020-04-16 PROCEDURE — 99213 OFFICE O/P EST LOW 20 MIN: CPT | Performed by: INTERNAL MEDICINE

## 2020-04-16 RX ORDER — PHENTERMINE HYDROCHLORIDE 37.5 MG/1
37.5 TABLET ORAL
Qty: 30 TABLET | Refills: 2 | Status: SHIPPED | OUTPATIENT
Start: 2020-04-16 | End: 2020-05-18

## 2020-04-16 NOTE — PROGRESS NOTES
Virtual Telephone Check-In    Ck Yanez verbally consents to a Virtual/Telephone Check-In visit on 04/16/20. Patient understands and accepts financial responsibility for any deductible, co-insurance and/or co-pays associated with this service.     D

## 2020-05-18 ENCOUNTER — VIRTUAL PHONE E/M (OUTPATIENT)
Dept: SURGERY | Facility: CLINIC | Age: 30
End: 2020-05-18
Payer: COMMERCIAL

## 2020-05-18 VITALS — WEIGHT: 266 LBS | BODY MASS INDEX: 45.41 KG/M2 | HEIGHT: 64 IN

## 2020-05-18 DIAGNOSIS — I10 ESSENTIAL HYPERTENSION: Primary | ICD-10-CM

## 2020-05-18 DIAGNOSIS — E66.9 OBESITY (BMI 30-39.9): ICD-10-CM

## 2020-05-18 DIAGNOSIS — Z51.81 ENCOUNTER FOR THERAPEUTIC DRUG MONITORING: ICD-10-CM

## 2020-05-18 DIAGNOSIS — R63.2 INCREASED APPETITE: ICD-10-CM

## 2020-05-18 PROCEDURE — 99213 OFFICE O/P EST LOW 20 MIN: CPT | Performed by: INTERNAL MEDICINE

## 2020-05-18 RX ORDER — PHENTERMINE HYDROCHLORIDE 37.5 MG/1
37.5 TABLET ORAL
Qty: 30 TABLET | Refills: 2 | Status: SHIPPED | OUTPATIENT
Start: 2020-05-18 | End: 2020-09-10

## 2020-05-18 NOTE — PROGRESS NOTES
[address], [place] [address] Francisca Angulo  [facility] [address] [facility] [address]  Dept: [phone]     Virtual Telephone Check-In    Himanshu Yu verbally consents to a Virtual/Telephone Check-In visit on 05/18/2 • Levocetirizine Dihydrochloride 5 MG Oral Tab Take 1 tablet (5 mg total) by mouth every evening. 90 tablet 1   • Ferrous Sulfate 325 (65 Fe) MG Oral Tab Take 1 tablet (325 mg total) by mouth daily with breakfast. 90 tablet 0   • hydrochlorothiazide 12. 5 Not on file    Other Topics      Concerns:        Caffeine Concern: No        Exercise: Yes        Seat Belt: Not Asked        Special Diet: No        Stress Concern: Not Asked        Weight Concern: Yes        Grew up on a farm: Not Asked        History o to complete each meal    Exercise Goals Reviewed and Discussed    Walk for  30 Minutes    ROS:    Review of Systems   Constitutional: Negative. Negative for activity change, appetite change, chills and fatigue. HENT: Negative. Respiratory: Negative. again         No orders of the defined types were placed in this encounter.            Malathi Boggs MD

## 2020-06-25 ENCOUNTER — OFFICE VISIT (OUTPATIENT)
Dept: OBGYN CLINIC | Facility: CLINIC | Age: 30
End: 2020-06-25
Payer: COMMERCIAL

## 2020-06-25 VITALS
DIASTOLIC BLOOD PRESSURE: 82 MMHG | SYSTOLIC BLOOD PRESSURE: 120 MMHG | HEART RATE: 97 BPM | WEIGHT: 266 LBS | BODY MASS INDEX: 46 KG/M2

## 2020-06-25 DIAGNOSIS — N90.89 VULVAR IRRITATION: Primary | ICD-10-CM

## 2020-06-25 PROCEDURE — 99213 OFFICE O/P EST LOW 20 MIN: CPT | Performed by: OBSTETRICS & GYNECOLOGY

## 2020-06-25 NOTE — H&P
HPI:  The patient is a 27 yo F c/o vulvovaginal irritation for last 3 weeks. Itching externally. No dc or odor.         Reviewed medical and surgical history below       OBSTETRICS HISTORY:  OB History     T1    L2    SAB0  TAB0  Ectopic0  Mult control/protection: I[facility]U.D., [facility]    Lifestyle      Physical activity:        Days per week: Not on file        Minutes per session: Not on file      Stress: Not on file    Relationships      Social connections:        Talks on phone: Not on file 3  •  ergocalciferol 54811 units Oral Cap, Take 1 capsule (50,000 Units total) by mouth once a week., Disp: 12 capsule, Rfl: 2  •  metRONIDAZOLE (FLAGYL) 500 MG Oral Tab, Take 1 tablet (500 mg total) by mouth 2 (two) times daily for 7 days. , Disp: 14 table

## 2020-06-26 ENCOUNTER — TELEPHONE (OUTPATIENT)
Dept: OBGYN CLINIC | Facility: CLINIC | Age: 30
End: 2020-06-26

## 2020-06-26 RX ORDER — METRONIDAZOLE 500 MG/1
500 TABLET ORAL 2 TIMES DAILY
Qty: 14 TABLET | Refills: 0 | Status: SHIPPED | OUTPATIENT
Start: 2020-06-26 | End: 2020-07-03

## 2020-06-26 NOTE — TELEPHONE ENCOUNTER
Pt informed of results and recs. Pt chose Flagyl and advised not to consume any etoh during tx. Pt verbalized understanding.

## 2020-06-26 NOTE — TELEPHONE ENCOUNTER
----- Message from Zelda Meredith DO sent at 6/26/2020  7:46 AM CDT -----  +BV.   Needs flagyl or metrogel

## 2020-07-16 ENCOUNTER — TELEPHONE (OUTPATIENT)
Dept: SURGERY | Facility: CLINIC | Age: 30
End: 2020-07-16

## 2020-07-16 NOTE — TELEPHONE ENCOUNTER
Per the request of Dr. Shima Newman. Called and discussed all 2020 insurance information and Bariatric Program. Patient will be registering for August seminar. All info will be sent to surgeon's office for pre-verification and scheduling.

## 2020-07-27 ENCOUNTER — HOSPITAL ENCOUNTER (OUTPATIENT)
Age: 30
Discharge: HOME | End: 2020-07-27
Attending: EMERGENCY MEDICINE
Payer: OTHER MISCELLANEOUS

## 2020-07-27 ENCOUNTER — APPOINTMENT (OUTPATIENT)
Dept: GENERAL RADIOLOGY | Age: 30
End: 2020-07-27
Attending: EMERGENCY MEDICINE
Payer: OTHER MISCELLANEOUS

## 2020-07-27 VITALS
OXYGEN SATURATION: 99 % | DIASTOLIC BLOOD PRESSURE: 64 MMHG | WEIGHT: 270 LBS | TEMPERATURE: 98 F | HEIGHT: 64 IN | HEART RATE: 96 BPM | BODY MASS INDEX: 46.1 KG/M2 | SYSTOLIC BLOOD PRESSURE: 137 MMHG | RESPIRATION RATE: 20 BRPM

## 2020-07-27 DIAGNOSIS — S43.402A SPRAIN OF LEFT SHOULDER, UNSPECIFIED SHOULDER SPRAIN TYPE, INITIAL ENCOUNTER: Primary | ICD-10-CM

## 2020-07-27 PROCEDURE — 99213 OFFICE O/P EST LOW 20 MIN: CPT

## 2020-07-27 PROCEDURE — 73030 X-RAY EXAM OF SHOULDER: CPT | Performed by: EMERGENCY MEDICINE

## 2020-07-27 NOTE — ED PROVIDER NOTES
Patient Seen in: [address] [facility]      History   Patient presents with:  Upper Extremity Injury  Worker's Comp    Stated Complaint: Hurt shoulder    HPI    The patient is a 25-year-old female with past history of preeclampsia w 96   Temp 98.1 °F (36.7 °C) (Temporal)   Resp 20   Ht 162.6 cm (5' 4\")   Wt 122.5 kg   LMP 06/14/2020 (Approximate)   SpO2 99%   BMI 46.35 kg/m²         Physical Exam    Constitutional: Well-developed well-nourished in no acute distress  Head: Normocephal

## 2020-07-27 NOTE — ED INITIAL ASSESSMENT (HPI)
PATIENT REPORTS LEFT SHOULDER INJURY AFTER ATTEMPTING TO BREAK UP A FIGHT AT WORK YESTERDAY. PATIENT REPORTS INCREASED PAIN WITH MOVEMENT. DENIES BRUISING.

## 2020-08-04 ENCOUNTER — APPOINTMENT (OUTPATIENT)
Dept: OTHER | Facility: HOSPITAL | Age: 30
End: 2020-08-04
Attending: ORTHOPAEDIC SURGERY

## 2020-08-10 ENCOUNTER — TELEPHONE (OUTPATIENT)
Dept: PHYSICAL THERAPY | Age: 30
End: 2020-08-10

## 2020-08-10 ENCOUNTER — OFFICE VISIT (OUTPATIENT)
Dept: OTHER | Facility: HOSPITAL | Age: 30
End: 2020-08-10
Attending: EMERGENCY MEDICINE
Payer: OTHER MISCELLANEOUS

## 2020-08-10 DIAGNOSIS — S46.912A STRAIN OF LEFT SHOULDER: Primary | ICD-10-CM

## 2020-08-17 ENCOUNTER — APPOINTMENT (OUTPATIENT)
Dept: OTHER | Facility: HOSPITAL | Age: 30
End: 2020-08-17
Attending: EMERGENCY MEDICINE

## 2020-08-19 ENCOUNTER — TELEPHONE (OUTPATIENT)
Dept: PHYSICAL THERAPY | Age: 30
End: 2020-08-19

## 2020-08-19 ENCOUNTER — APPOINTMENT (OUTPATIENT)
Dept: PHYSICAL THERAPY | Facility: HOSPITAL | Age: 30
End: 2020-08-19
Attending: EMERGENCY MEDICINE
Payer: OTHER MISCELLANEOUS

## 2020-08-21 ENCOUNTER — TELEPHONE (OUTPATIENT)
Dept: PHYSICAL THERAPY | Facility: HOSPITAL | Age: 30
End: 2020-08-21

## 2020-08-21 NOTE — TELEPHONE ENCOUNTER
Called pt to ask if she still has a fever or other symptoms. Asked pt to call back prior to appt on Monday to let us know that way we can determine if safe for pt to come to therapy appt.

## 2020-08-24 ENCOUNTER — OFFICE VISIT (OUTPATIENT)
Dept: PHYSICAL THERAPY | Facility: HOSPITAL | Age: 30
End: 2020-08-24
Attending: INTERNAL MEDICINE
Payer: OTHER MISCELLANEOUS

## 2020-08-24 DIAGNOSIS — S46.912A STRAIN OF LEFT SHOULDER: ICD-10-CM

## 2020-08-24 PROCEDURE — 97140 MANUAL THERAPY 1/> REGIONS: CPT

## 2020-08-24 PROCEDURE — 97161 PT EVAL LOW COMPLEX 20 MIN: CPT

## 2020-08-24 PROCEDURE — 97110 THERAPEUTIC EXERCISES: CPT

## 2020-08-25 NOTE — PROGRESS NOTES
SHOULDER EVALUATION:   Referring Physician: Dr. Beatris Cuevas  Diagnosis: Strain of left shoulder (L69.821U)     Date of Service: 8/24/2020     PATIENT SUMMARY   Jerrell Downing is a 26year old female who presents to therapy today with complaints of L shoulder with [place]. Significant findings include none. Pt denies HTN, diabetes, CA or heart issues. [facility] presents to physical therapy evaluation with primary c/o L shoulder pain.  The results of the objective tests and measures show limited ROM o tests performed this date due to increasing pain level in shoulder     Today’s Treatment and Response:   Pt education was provided on exam findings, treatment diagnosis, treatment plan, expectations, and prognosis.  Pt was also provided recommendations for has agreed to actively participate in planning and for this course of care. Thank you for your referral. Please co-sign or sign and return this letter via fax as soon as possible to [phone].  If you have any questions, please contact me at Dept: [phone]

## 2020-08-26 ENCOUNTER — APPOINTMENT (OUTPATIENT)
Dept: OTHER | Facility: HOSPITAL | Age: 30
End: 2020-08-26
Attending: EMERGENCY MEDICINE

## 2020-08-27 ENCOUNTER — OFFICE VISIT (OUTPATIENT)
Dept: PHYSICAL THERAPY | Facility: HOSPITAL | Age: 30
End: 2020-08-27
Attending: EMERGENCY MEDICINE
Payer: OTHER MISCELLANEOUS

## 2020-08-27 PROCEDURE — 97112 NEUROMUSCULAR REEDUCATION: CPT

## 2020-08-27 PROCEDURE — 97140 MANUAL THERAPY 1/> REGIONS: CPT

## 2020-08-27 PROCEDURE — 97110 THERAPEUTIC EXERCISES: CPT

## 2020-08-27 NOTE — PROGRESS NOTES
Dx: Strain of left shoulder (S46.912A)           Insurance (Authorized # of Visits):   Worker's Comp (12)            Authorizing Physician: Dr. Eileen Fernandez  Next MD visit: next week  Fall Risk: standard         Precautions: n/a             Subjective: Pt reports 10x5\"  Standing shoulder flex with focus on upward rotation of scapuale - 2x10   Prone scap retraction - 10x5\"   Sidelying shoulder flex with tactile cues for upward rotation with manual cues - 10       HEP: AA flex, scap squeezes, pendulums, isometrics

## 2020-09-01 ENCOUNTER — OFFICE VISIT (OUTPATIENT)
Dept: PHYSICAL THERAPY | Facility: HOSPITAL | Age: 30
End: 2020-09-01
Attending: EMERGENCY MEDICINE
Payer: OTHER MISCELLANEOUS

## 2020-09-01 PROCEDURE — 97110 THERAPEUTIC EXERCISES: CPT

## 2020-09-01 PROCEDURE — 97140 MANUAL THERAPY 1/> REGIONS: CPT

## 2020-09-01 PROCEDURE — 97112 NEUROMUSCULAR REEDUCATION: CPT

## 2020-09-01 NOTE — PROGRESS NOTES
Dx: Strain of left shoulder (S46.912A)           Insurance (Authorized # of Visits):   Worker's Comp (12)            Authorizing Physician: Dr. Kelsey Rodriguez  Next MD visit: next week  Fall Risk: standard         Precautions: n/a             Subjective: Pt reports TherEx:  Pulleys into flex to ~100 degrees - 5 mins  Quadruped rocking with focus on upward rotation of B scapulae - 10x5\" TherEx:  Pulleys into flex to ~100 degrees - 5 mins  Quadruped rocking with focus on upward rotation of B scapulae - 10x5\"     Ne

## 2020-09-03 ENCOUNTER — APPOINTMENT (OUTPATIENT)
Dept: OTHER | Facility: HOSPITAL | Age: 30
End: 2020-09-03
Attending: EMERGENCY MEDICINE
Payer: COMMERCIAL

## 2020-09-04 ENCOUNTER — OFFICE VISIT (OUTPATIENT)
Dept: PHYSICAL THERAPY | Facility: HOSPITAL | Age: 30
End: 2020-09-04
Attending: EMERGENCY MEDICINE
Payer: OTHER MISCELLANEOUS

## 2020-09-04 DIAGNOSIS — E66.9 OBESITY (BMI 30-39.9): Primary | ICD-10-CM

## 2020-09-04 PROCEDURE — 97110 THERAPEUTIC EXERCISES: CPT

## 2020-09-04 PROCEDURE — 97140 MANUAL THERAPY 1/> REGIONS: CPT

## 2020-09-04 PROCEDURE — 97112 NEUROMUSCULAR REEDUCATION: CPT

## 2020-09-04 NOTE — PROGRESS NOTES
Dx: Strain of left shoulder (S46.912A)           Insurance (Authorized # of Visits):   Worker's Comp (12)            Authorizing Physician: Dr. Sammie Fraga  Next MD visit: next week  Fall Risk: standard         Precautions: n/a             Subjective: Pt reports on upward rotation of B scapulae - 10x5\" TherEx:  Pulleys into flex to ~100 degrees - 5 mins  Quadruped rocking with focus on upward rotation of B scapulae - 10x5\" TherEx:  Pulleys into flex to ~100 degrees - 5 mins  Quadruped rocking with focus on upwar

## 2020-09-08 ENCOUNTER — APPOINTMENT (OUTPATIENT)
Dept: PHYSICAL THERAPY | Facility: HOSPITAL | Age: 30
End: 2020-09-08
Attending: EMERGENCY MEDICINE
Payer: OTHER MISCELLANEOUS

## 2020-09-08 ENCOUNTER — TELEPHONE (OUTPATIENT)
Dept: PHYSICAL THERAPY | Age: 30
End: 2020-09-08

## 2020-09-09 ENCOUNTER — LAB ENCOUNTER (OUTPATIENT)
Dept: LAB | Facility: HOSPITAL | Age: 30
End: 2020-09-09
Attending: SINGLE SPECIALTY
Payer: COMMERCIAL

## 2020-09-09 ENCOUNTER — OFFICE VISIT (OUTPATIENT)
Dept: SURGERY | Facility: CLINIC | Age: 30
End: 2020-09-09

## 2020-09-09 ENCOUNTER — DOCUMENTATION ONLY (OUTPATIENT)
Dept: SURGERY | Facility: CLINIC | Age: 30
End: 2020-09-09

## 2020-09-09 VITALS
OXYGEN SATURATION: 98 % | HEIGHT: 64 IN | DIASTOLIC BLOOD PRESSURE: 84 MMHG | BODY MASS INDEX: 46.1 KG/M2 | HEART RATE: 73 BPM | WEIGHT: 270 LBS | SYSTOLIC BLOOD PRESSURE: 110 MMHG

## 2020-09-09 DIAGNOSIS — I10 ESSENTIAL HYPERTENSION: ICD-10-CM

## 2020-09-09 DIAGNOSIS — G47.33 OSA ON CPAP: Primary | ICD-10-CM

## 2020-09-09 DIAGNOSIS — R63.5 WEIGHT GAIN: ICD-10-CM

## 2020-09-09 DIAGNOSIS — E66.01 MORBID OBESITY DUE TO EXCESS CALORIES (HCC): ICD-10-CM

## 2020-09-09 DIAGNOSIS — Z99.89 OSA ON CPAP: ICD-10-CM

## 2020-09-09 DIAGNOSIS — Z51.81 ENCOUNTER FOR THERAPEUTIC DRUG MONITORING: ICD-10-CM

## 2020-09-09 DIAGNOSIS — Z99.89 OSA ON CPAP: Primary | ICD-10-CM

## 2020-09-09 DIAGNOSIS — G47.33 OSA ON CPAP: ICD-10-CM

## 2020-09-09 LAB
25(OH)D3 SERPL-MCNC: 14.5 NG/ML (ref 30–100)
ALBUMIN SERPL-MCNC: 3.8 G/DL (ref 3.4–5)
ALBUMIN/GLOB SERPL: 0.8 {RATIO} (ref 1–2)
ALP LIVER SERPL-CCNC: 89 U/L (ref 37–98)
ALT SERPL-CCNC: 20 U/L (ref 13–56)
ANION GAP SERPL CALC-SCNC: 3 MMOL/L (ref 0–18)
AST SERPL-CCNC: 18 U/L (ref 15–37)
BASOPHILS # BLD AUTO: 0.02 X10(3) UL (ref 0–0.2)
BASOPHILS NFR BLD AUTO: 0.4 %
BILIRUB SERPL-MCNC: 0.4 MG/DL (ref 0.1–2)
BUN BLD-MCNC: 10 MG/DL (ref 7–18)
BUN/CREAT SERPL: 11.4 (ref 10–20)
CALCIUM BLD-MCNC: 9.6 MG/DL (ref 8.5–10.1)
CHLORIDE SERPL-SCNC: 108 MMOL/L (ref 98–112)
CHOLEST SMN-MCNC: 198 MG/DL (ref ?–200)
CO2 SERPL-SCNC: 30 MMOL/L (ref 21–32)
CREAT BLD-MCNC: 0.88 MG/DL (ref 0.55–1.02)
DEPRECATED HBV CORE AB SER IA-ACNC: 109.9 NG/ML (ref 12–160)
DEPRECATED RDW RBC AUTO: 42.2 FL (ref 35.1–46.3)
EOSINOPHIL # BLD AUTO: 0.13 X10(3) UL (ref 0–0.7)
EOSINOPHIL NFR BLD AUTO: 2.3 %
ERYTHROCYTE [DISTWIDTH] IN BLOOD BY AUTOMATED COUNT: 13.6 % (ref 11–15)
EST. AVERAGE GLUCOSE BLD GHB EST-MCNC: 114 MG/DL (ref 68–126)
FOLATE SERPL-MCNC: 11.9 NG/ML (ref 8.7–?)
GLOBULIN PLAS-MCNC: 4.5 G/DL (ref 2.8–4.4)
GLUCOSE BLD-MCNC: 89 MG/DL (ref 70–99)
HAV IGM SER QL: 2.3 MG/DL (ref 1.6–2.6)
HBA1C MFR BLD HPLC: 5.6 % (ref ?–5.7)
HCT VFR BLD AUTO: 40.9 % (ref 35–48)
HDLC SERPL-MCNC: 57 MG/DL (ref 40–59)
HGB BLD-MCNC: 13.3 G/DL (ref 12–16)
IMM GRANULOCYTES # BLD AUTO: 0.02 X10(3) UL (ref 0–1)
IMM GRANULOCYTES NFR BLD: 0.4 %
IRON SATURATION: 16 % (ref 15–50)
IRON SERPL-MCNC: 65 UG/DL (ref 50–170)
LDLC SERPL CALC-MCNC: 114 MG/DL (ref ?–100)
LYMPHOCYTES # BLD AUTO: 1.76 X10(3) UL (ref 1–4)
LYMPHOCYTES NFR BLD AUTO: 31.7 %
M PROTEIN MFR SERPL ELPH: 8.3 G/DL (ref 6.4–8.2)
MCH RBC QN AUTO: 27.8 PG (ref 26–34)
MCHC RBC AUTO-ENTMCNC: 32.5 G/DL (ref 31–37)
MCV RBC AUTO: 85.4 FL (ref 80–100)
MONOCYTES # BLD AUTO: 0.4 X10(3) UL (ref 0.1–1)
MONOCYTES NFR BLD AUTO: 7.2 %
NEUTROPHILS # BLD AUTO: 3.22 X10 (3) UL (ref 1.5–7.7)
NEUTROPHILS # BLD AUTO: 3.22 X10(3) UL (ref 1.5–7.7)
NEUTROPHILS NFR BLD AUTO: 58 %
NONHDLC SERPL-MCNC: 141 MG/DL (ref ?–130)
OSMOLALITY SERPL CALC.SUM OF ELEC: 291 MOSM/KG (ref 275–295)
PATIENT FASTING Y/N/NP: YES
PATIENT FASTING Y/N/NP: YES
PHOSPHATE SERPL-MCNC: 2.4 MG/DL (ref 2.5–4.9)
PLATELET # BLD AUTO: 229 10(3)UL (ref 150–450)
POTASSIUM SERPL-SCNC: 4.3 MMOL/L (ref 3.5–5.1)
RBC # BLD AUTO: 4.79 X10(6)UL (ref 3.8–5.3)
SODIUM SERPL-SCNC: 141 MMOL/L (ref 136–145)
TOTAL IRON BINDING CAPACITY: 417 UG/DL (ref 240–450)
TRANSFERRIN SERPL-MCNC: 280 MG/DL (ref 200–360)
TRIGL SERPL-MCNC: 134 MG/DL (ref 30–149)
TSI SER-ACNC: 1.06 MIU/ML (ref 0.36–3.74)
VIT B12 SERPL-MCNC: 724 PG/ML (ref 193–986)
VLDLC SERPL CALC-MCNC: 27 MG/DL (ref 0–30)
WBC # BLD AUTO: 5.6 X10(3) UL (ref 4–11)

## 2020-09-09 PROCEDURE — 84425 ASSAY OF VITAMIN B-1: CPT

## 2020-09-09 PROCEDURE — 80061 LIPID PANEL: CPT

## 2020-09-09 PROCEDURE — 82306 VITAMIN D 25 HYDROXY: CPT

## 2020-09-09 PROCEDURE — 80053 COMPREHEN METABOLIC PANEL: CPT

## 2020-09-09 PROCEDURE — 82728 ASSAY OF FERRITIN: CPT

## 2020-09-09 PROCEDURE — 84100 ASSAY OF PHOSPHORUS: CPT

## 2020-09-09 PROCEDURE — 83735 ASSAY OF MAGNESIUM: CPT

## 2020-09-09 PROCEDURE — 36415 COLL VENOUS BLD VENIPUNCTURE: CPT

## 2020-09-09 PROCEDURE — 83540 ASSAY OF IRON: CPT

## 2020-09-09 PROCEDURE — 82746 ASSAY OF FOLIC ACID SERUM: CPT

## 2020-09-09 PROCEDURE — 84443 ASSAY THYROID STIM HORMONE: CPT

## 2020-09-09 PROCEDURE — 82607 VITAMIN B-12: CPT

## 2020-09-09 PROCEDURE — 85025 COMPLETE CBC W/AUTO DIFF WBC: CPT

## 2020-09-09 PROCEDURE — 83036 HEMOGLOBIN GLYCOSYLATED A1C: CPT

## 2020-09-09 PROCEDURE — 84466 ASSAY OF TRANSFERRIN: CPT

## 2020-09-09 NOTE — PROGRESS NOTES
[address]  Dept: [phone]    9/9/2020   Bariatric Patient Follow-up Evaluation    Chief Complaint:  Morbid obesity     History of Present Ill Socioeconomic History      Marital status: Single      Spouse name: Not on file      Number of children: Not on file      Years of education: Not on file      Highest education level: Not on file    Tobacco Use      Smoking status: Never Smoker      Smokel negative  Respiratory: negative  Cardiovascular: negative  Gastrointestinal: negative  Musculoskeletal: negative  Neurological: negative  Psychological: negative  Endocrine: negative  Remainder of 14 point review of systems is negative.       Physical Exam:

## 2020-09-09 NOTE — PROGRESS NOTES
Provided/reviewed orientation packet; pt is restarting the process; discussed and provided referrals; etc; pt will attend the bariatric webinar on 9/14/2020

## 2020-09-10 ENCOUNTER — OFFICE VISIT (OUTPATIENT)
Dept: SURGERY | Facility: CLINIC | Age: 30
End: 2020-09-10
Payer: COMMERCIAL

## 2020-09-10 ENCOUNTER — APPOINTMENT (OUTPATIENT)
Dept: OTHER | Facility: HOSPITAL | Age: 30
End: 2020-09-10
Attending: EMERGENCY MEDICINE

## 2020-09-10 VITALS
SYSTOLIC BLOOD PRESSURE: 120 MMHG | HEIGHT: 64 IN | BODY MASS INDEX: 46.1 KG/M2 | DIASTOLIC BLOOD PRESSURE: 80 MMHG | WEIGHT: 270 LBS

## 2020-09-10 DIAGNOSIS — Z51.81 ENCOUNTER FOR THERAPEUTIC DRUG MONITORING: ICD-10-CM

## 2020-09-10 DIAGNOSIS — G47.33 OSA ON CPAP: ICD-10-CM

## 2020-09-10 DIAGNOSIS — E66.9 OBESITY (BMI 30-39.9): ICD-10-CM

## 2020-09-10 DIAGNOSIS — I10 ESSENTIAL HYPERTENSION: Primary | ICD-10-CM

## 2020-09-10 DIAGNOSIS — Z99.89 OSA ON CPAP: ICD-10-CM

## 2020-09-10 DIAGNOSIS — E55.9 VITAMIN D DEFICIENCY: ICD-10-CM

## 2020-09-10 DIAGNOSIS — R63.2 INCREASED APPETITE: ICD-10-CM

## 2020-09-10 PROCEDURE — 3074F SYST BP LT 130 MM HG: CPT | Performed by: INTERNAL MEDICINE

## 2020-09-10 PROCEDURE — 99214 OFFICE O/P EST MOD 30 MIN: CPT | Performed by: INTERNAL MEDICINE

## 2020-09-10 PROCEDURE — 3079F DIAST BP 80-89 MM HG: CPT | Performed by: INTERNAL MEDICINE

## 2020-09-10 PROCEDURE — 3008F BODY MASS INDEX DOCD: CPT | Performed by: INTERNAL MEDICINE

## 2020-09-10 RX ORDER — PHENTERMINE HYDROCHLORIDE 37.5 MG/1
37.5 TABLET ORAL
Qty: 30 TABLET | Refills: 2 | Status: SHIPPED | OUTPATIENT
Start: 2020-09-10 | End: 2020-10-09

## 2020-09-10 RX ORDER — ERGOCALCIFEROL 1.25 MG/1
50000 CAPSULE ORAL WEEKLY
Qty: 12 CAPSULE | Refills: 2 | Status: SHIPPED | OUTPATIENT
Start: 2020-09-10 | End: 2021-03-08

## 2020-09-10 NOTE — PROGRESS NOTES
[address]  Dept: [phone]         Patient:  Aundrea Rodríguez  :      1990  MRN:      [id number]    Chief Complaint:  Patient presents tablet 1   • Ferrous Sulfate 325 (65 Fe) MG Oral Tab Take 1 tablet (325 mg total) by mouth daily with breakfast. (Patient not taking: Reported on 9/9/2020 ) 90 tablet 0   • hydrochlorothiazide 12.5 MG Oral Tab Take 1 tablet (12.5 mg total) by mouth daily. sexual activity: Not on file    Other Topics      Concerns:        Caffeine Concern: No        Exercise: Yes        Seat Belt: Not Asked        Special Diet: No        Stress Concern: Not Asked        Weight Concern: Yes        Grew up on a farm: Not Asked 20 to 30 minutes to complete each meal    Exercise Goals Reviewed and Discussed    Walk for  30 Minutes    ROS:    Review of Systems   Constitutional: Negative. Negative for activity change, appetite change, chills and fatigue. HENT: Negative.     Respir fruit juices or regular soda. 3. Increase activity-upper body exercises, walk 10 minutes per day.   4. Increase fruit and vegetable servings to 5-6 per day.       OBSTRUCTIVE SLEEP APNEA: The patient states her sleep apnea has been stable since the last cl

## 2020-09-11 ENCOUNTER — OFFICE VISIT (OUTPATIENT)
Dept: PHYSICAL THERAPY | Facility: HOSPITAL | Age: 30
End: 2020-09-11
Attending: EMERGENCY MEDICINE
Payer: OTHER MISCELLANEOUS

## 2020-09-11 PROCEDURE — 97110 THERAPEUTIC EXERCISES: CPT

## 2020-09-11 PROCEDURE — 97112 NEUROMUSCULAR REEDUCATION: CPT

## 2020-09-11 PROCEDURE — 97140 MANUAL THERAPY 1/> REGIONS: CPT

## 2020-09-11 NOTE — PROGRESS NOTES
Dx: Strain of left shoulder (S46.912A)           Insurance (Authorized # of Visits):   Worker's Comp (12)            Authorizing Physician: Dr. Samantha Tapia  Next MD visit: next week  Fall Risk: standard         Precautions: n/a             Subjective: Pt reports 5/12   Manual:  STM to L shoulder  GH distraction at neutral - grd I-II  Post glide - grd I  Scap mobilization in sidelying - grd II  Manual:  STM to L shoulder  GH distraction at neutral - grd I-II  Post glide - grd I Manual:  STM to L shoulder  [address] distra 1, Neuro Osiris - 1         Total Timed Treatment: 45 min  Total Treatment Time: 45 min

## 2020-09-13 LAB — VITAMIN B1 (THIAMINE), WHOLE B: 88 NMOL/L

## 2020-09-14 ENCOUNTER — MED REC SCAN ONLY (OUTPATIENT)
Dept: INTERNAL MEDICINE CLINIC | Facility: CLINIC | Age: 30
End: 2020-09-14

## 2020-09-16 ENCOUNTER — TELEPHONE (OUTPATIENT)
Dept: NEUROLOGY | Facility: CLINIC | Age: 30
End: 2020-09-16

## 2020-09-16 ENCOUNTER — OFFICE VISIT (OUTPATIENT)
Dept: NEUROLOGY | Facility: CLINIC | Age: 30
End: 2020-09-16
Payer: OTHER MISCELLANEOUS

## 2020-09-16 ENCOUNTER — OFFICE VISIT (OUTPATIENT)
Dept: PHYSICAL THERAPY | Facility: HOSPITAL | Age: 30
End: 2020-09-16
Attending: EMERGENCY MEDICINE
Payer: OTHER MISCELLANEOUS

## 2020-09-16 VITALS — WEIGHT: 270 LBS | BODY MASS INDEX: 46.1 KG/M2 | HEIGHT: 64 IN

## 2020-09-16 DIAGNOSIS — S43.422A SPRAIN OF LEFT ROTATOR CUFF CAPSULE, INITIAL ENCOUNTER: Primary | ICD-10-CM

## 2020-09-16 PROCEDURE — 97140 MANUAL THERAPY 1/> REGIONS: CPT

## 2020-09-16 PROCEDURE — 3008F BODY MASS INDEX DOCD: CPT | Performed by: PHYSICAL MEDICINE & REHABILITATION

## 2020-09-16 PROCEDURE — 97112 NEUROMUSCULAR REEDUCATION: CPT

## 2020-09-16 PROCEDURE — 99204 OFFICE O/P NEW MOD 45 MIN: CPT | Performed by: PHYSICAL MEDICINE & REHABILITATION

## 2020-09-16 RX ORDER — MELOXICAM 15 MG/1
15 TABLET ORAL DAILY
Qty: 30 TABLET | Refills: 0 | Status: SHIPPED | OUTPATIENT
Start: 2020-09-16 | End: 2020-10-03

## 2020-09-16 RX ORDER — IBUPROFEN 200 MG
200 TABLET ORAL EVERY 6 HOURS PRN
COMMUNITY
End: 2020-10-26 | Stop reason: ALTCHOICE

## 2020-09-16 NOTE — PROGRESS NOTES
[address]  Progress Note    CHIEF COMPLAINT:  Patient presents with:  Shoulder Pain: New patient referred by Dr. Kristina Reddy for left shoulder pain.  States that she works as a  and on 07/26/2020 Comment: SOCIALLY      Drug use: No      Sexual activity: Yes        Partners: Male        Birth control/protection: I.U.D., Paragard      FAMILY HISTORY:   No family history on file.     CURRENT MEDICATIONS:   Current Outpatient Medications   Medicati Urination: denies   Musculoskeletal  Joint Stiffness: denies  Painful Joints: admits  Tailbone Pain: denies  Swollen Joints: denies   Peripheral Vascular  Swelling of Legs/Feet: denies  Cold Extremities: denies   Skin  Open Sores: denies  Nodules or Lumps: restrictions are unavailable, please consider the patient to be off work)   Lift/Carry: Maximum pounds = 10 left arm  Push/Pull: Maximum pounds = 10 left arm  Bend/Squat: No Restrictions  Stand/Walk: No Restrictions  Safety: No Climbing / Working at [employer]

## 2020-09-17 ENCOUNTER — MED REC SCAN ONLY (OUTPATIENT)
Dept: NEUROLOGY | Facility: CLINIC | Age: 30
End: 2020-09-17

## 2020-09-18 ENCOUNTER — TELEPHONE (OUTPATIENT)
Dept: OCCUPATIONAL MEDICINE | Age: 30
End: 2020-09-18

## 2020-09-18 ENCOUNTER — APPOINTMENT (OUTPATIENT)
Dept: PHYSICAL THERAPY | Facility: HOSPITAL | Age: 30
End: 2020-09-18
Attending: EMERGENCY MEDICINE
Payer: OTHER MISCELLANEOUS

## 2020-09-21 ENCOUNTER — TELEPHONE (OUTPATIENT)
Dept: NEUROLOGY | Facility: CLINIC | Age: 30
End: 2020-09-21

## 2020-09-22 ENCOUNTER — OFFICE VISIT (OUTPATIENT)
Dept: SURGERY | Facility: CLINIC | Age: 30
End: 2020-09-22
Payer: COMMERCIAL

## 2020-09-22 VITALS — HEIGHT: 64 IN | BODY MASS INDEX: 46.08 KG/M2 | WEIGHT: 269.88 LBS

## 2020-09-22 DIAGNOSIS — E66.01 CLASS 3 SEVERE OBESITY DUE TO EXCESS CALORIES WITH SERIOUS COMORBIDITY AND BODY MASS INDEX (BMI) OF 45.0 TO 49.9 IN ADULT (HCC): Primary | ICD-10-CM

## 2020-09-22 PROCEDURE — 3008F BODY MASS INDEX DOCD: CPT | Performed by: DIETITIAN, REGISTERED

## 2020-09-22 PROCEDURE — 97803 MED NUTRITION INDIV SUBSEQ: CPT | Performed by: DIETITIAN, REGISTERED

## 2020-09-22 NOTE — PROGRESS NOTES
[address] [place] [address] AND WEIGHT LOSS CLINIC  [address] [place] [address]  Dept: [phone]  Loc: [phone]    09/22/20      Bariatric Follow-up Nutrition Session    Salty Del Toro is a 26year old female.      As (H) 09/09/2020        Vitamins/Minerals:  Lab Results   Component Value Date    B12 724 09/09/2020     Lab Results   Component Value Date    VITD 14.5 (L) 09/09/2020     Lab Results   Component Value Date/Time    THIAMINE 88 09/09/2020 10:07 AM      No res grams/day  Fluid intake:  64+ ounces/day    Diet history:       Breakfast      AM Snack       Lunch     PM Snack     Dinner   Chicken sausage link or turkey cardona (2 slices), 3 eggs, 1 slice toast or waffle sometimes + syrup  Jessica An, [employer], [employer] evidenced by history     Intervention     Recommendations/goals:    1. Track foods in MFP  2. Prioritize protein + produce  3. Add protein shake or protein bar instead of skipping dinner  4.  Restart exercise with         Monitor/Evaluate     Anthrop

## 2020-09-23 ENCOUNTER — APPOINTMENT (OUTPATIENT)
Dept: PHYSICAL THERAPY | Facility: HOSPITAL | Age: 30
End: 2020-09-23
Attending: EMERGENCY MEDICINE
Payer: OTHER MISCELLANEOUS

## 2020-09-23 ENCOUNTER — TELEPHONE (OUTPATIENT)
Dept: PHYSICAL THERAPY | Age: 30
End: 2020-09-23

## 2020-09-25 ENCOUNTER — OFFICE VISIT (OUTPATIENT)
Dept: PHYSICAL THERAPY | Facility: HOSPITAL | Age: 30
End: 2020-09-25
Attending: EMERGENCY MEDICINE
Payer: OTHER MISCELLANEOUS

## 2020-09-25 PROCEDURE — 97140 MANUAL THERAPY 1/> REGIONS: CPT

## 2020-09-25 PROCEDURE — 97110 THERAPEUTIC EXERCISES: CPT

## 2020-09-25 PROCEDURE — 97112 NEUROMUSCULAR REEDUCATION: CPT

## 2020-09-25 NOTE — PROGRESS NOTES
Dx: Strain of left shoulder (S46.912A)           Insurance (Authorized # of Visits):   Worker's Comp (12)            Authorizing Physician: Dr. Eileen Fernandez  Next MD visit: next week  Fall Risk: standard         Precautions: n/a             Subjective: Pt reports into flex to ~100 degrees - 5 mins  Quadruped rocking with focus on upward rotation of B scapulae - 10x10\" TherEx:  Pulleys into flex - 5 mins  Quadruped rocking with focus on upward rotation of B scapulae - 10x10\" TherEx:  UBE, level 3 - 5 mins   TherEx

## 2020-09-30 ENCOUNTER — TELEPHONE (OUTPATIENT)
Dept: PHYSICAL THERAPY | Facility: HOSPITAL | Age: 30
End: 2020-09-30

## 2020-09-30 ENCOUNTER — APPOINTMENT (OUTPATIENT)
Dept: PHYSICAL THERAPY | Facility: HOSPITAL | Age: 30
End: 2020-09-30
Attending: EMERGENCY MEDICINE
Payer: OTHER MISCELLANEOUS

## 2020-09-30 NOTE — TELEPHONE ENCOUNTER
Called pt due to no show this date. Pt forgot to call and tell us that she wasn't able to make it today. Confirmed appt on Friday with pt.

## 2020-10-01 ENCOUNTER — TELEPHONE (OUTPATIENT)
Dept: PHYSICAL THERAPY | Facility: HOSPITAL | Age: 30
End: 2020-10-01

## 2020-10-02 ENCOUNTER — OFFICE VISIT (OUTPATIENT)
Dept: NEUROLOGY | Facility: CLINIC | Age: 30
End: 2020-10-02
Payer: OTHER MISCELLANEOUS

## 2020-10-02 ENCOUNTER — MED REC SCAN ONLY (OUTPATIENT)
Dept: NEUROLOGY | Facility: CLINIC | Age: 30
End: 2020-10-02

## 2020-10-02 ENCOUNTER — OFFICE VISIT (OUTPATIENT)
Dept: PHYSICAL THERAPY | Facility: HOSPITAL | Age: 30
End: 2020-10-02
Attending: EMERGENCY MEDICINE
Payer: OTHER MISCELLANEOUS

## 2020-10-02 VITALS — BODY MASS INDEX: 45.93 KG/M2 | HEIGHT: 64 IN | WEIGHT: 269 LBS

## 2020-10-02 DIAGNOSIS — S43.422A SPRAIN OF LEFT ROTATOR CUFF CAPSULE, INITIAL ENCOUNTER: Primary | ICD-10-CM

## 2020-10-02 PROCEDURE — 97110 THERAPEUTIC EXERCISES: CPT

## 2020-10-02 PROCEDURE — 97140 MANUAL THERAPY 1/> REGIONS: CPT

## 2020-10-02 PROCEDURE — 99213 OFFICE O/P EST LOW 20 MIN: CPT | Performed by: PHYSICAL MEDICINE & REHABILITATION

## 2020-10-02 PROCEDURE — 3008F BODY MASS INDEX DOCD: CPT | Performed by: PHYSICAL MEDICINE & REHABILITATION

## 2020-10-02 NOTE — PROGRESS NOTES
[address]  Progress Note    CHIEF COMPLAINT:  Patient presents with:  Shoulder Pain: Patient presents to follow up on left rotator cuff. LOV 09/16/2020 Patient states that she is 90% better.        History o Comment: SOCIALLY      Drug use: No      Sexual activity: Yes        Partners: Male        Birth control/protection: I.U.D., Paragard      FAMILY HISTORY:   No family history on file.     CURRENT MEDICATIONS:   Current Outpatient Medications   Medicati in the HPI. PHYSICAL EXAM:   Ht 64\"   Wt 269 lb (122 kg)   LMP 06/14/2020 (Approximate)   BMI 46.17 kg/m²     Body mass index is 46.17 kg/m². General: No immediate distress  Eyes: Extra-occular movements intact.    Heart: peripheral pulses [place]

## 2020-10-02 NOTE — PROGRESS NOTES
ProgressSummary  Pt has attended 8 visits in Physical Therapy. Dx: Strain of left shoulder (S46.912A)           Insurance (Authorized # of Visits):   Worker's Comp (12)            Authorizing Physician: Dr. Tonya Hernandes  Next MD visit: next week  Fall Risk: and for this course of care. Thank you for your referral. If you have any questions, please contact me at Dept: [phone].     Sincerely,  Electronically signed by therapist: Mary Sutton, PT       Date: 9/11/2020             TX#: 5/12 9/16/2020

## 2020-10-06 ENCOUNTER — PATIENT MESSAGE (OUTPATIENT)
Dept: OBGYN CLINIC | Facility: CLINIC | Age: 30
End: 2020-10-06

## 2020-10-07 ENCOUNTER — TELEPHONE (OUTPATIENT)
Dept: OBGYN CLINIC | Facility: CLINIC | Age: 30
End: 2020-10-07

## 2020-10-07 ENCOUNTER — OFFICE VISIT (OUTPATIENT)
Dept: SURGERY | Facility: CLINIC | Age: 30
End: 2020-10-07
Payer: COMMERCIAL

## 2020-10-07 VITALS
SYSTOLIC BLOOD PRESSURE: 118 MMHG | HEIGHT: 64 IN | WEIGHT: 267.19 LBS | HEART RATE: 83 BPM | OXYGEN SATURATION: 97 % | DIASTOLIC BLOOD PRESSURE: 64 MMHG | BODY MASS INDEX: 45.62 KG/M2

## 2020-10-07 DIAGNOSIS — Z32.00 PREGNANCY EXAMINATION OR TEST, PREGNANCY UNCONFIRMED: Primary | ICD-10-CM

## 2020-10-07 DIAGNOSIS — Z99.89 OSA ON CPAP: Primary | ICD-10-CM

## 2020-10-07 DIAGNOSIS — R63.5 WEIGHT GAIN: ICD-10-CM

## 2020-10-07 DIAGNOSIS — G47.33 OSA ON CPAP: Primary | ICD-10-CM

## 2020-10-07 NOTE — TELEPHONE ENCOUNTER
C/O irregular periods for over 1 year. Attempting conception for at 1.5 years. Not unusual to miss 1-2 periods.   Missed period in July but had period on 8-3-20 and the 2 previous bleeds were lighter than usual.  Advised to be seen and appt scheduled with

## 2020-10-07 NOTE — PROGRESS NOTES
[address], [address]  Dept: [phone]    10/7/2020   Bariatric Patient Follow-up Evaluation    Chief Complaint:  Morbid obesity     History of Present [place] use: Yes        Alcohol/week: 0.0 standard drinks        Comment: SOCIALLY      Drug use: No      Sexual activity: Yes        Partners: Male        Birth control/protection: I.U.D., Paragard    Other Topics      Concerns:        Caffeine Concern:  No BMI:  Body mass index is 45.86 kg/m².   General: no acute distress, well-nourished  HENT: normocephalic, atraumatic  Lung: breathing comfortably, symmetrical expansion, clear to ausculation bilaterally, no wheezing  Heart: regular rate and rhythm, no murm

## 2020-10-07 NOTE — TELEPHONE ENCOUNTER
From: Monik Pearl  To: Nasrin Serna MD  Sent: 10/6/2020 8:49 PM CDT  Subject: Other    Hello recently found out that I'm pregnant but was hoping that you could still be my primary doctor during my pregnancy? Trying to get an appointment with you.

## 2020-10-07 NOTE — TELEPHONE ENCOUNTER
Patient took a pregnancy test and was positive. ..wants to know what her next steps are and wants to start scheduling appointments if labs confirm pregnancy.

## 2020-10-09 ENCOUNTER — OFFICE VISIT (OUTPATIENT)
Dept: OBGYN CLINIC | Facility: CLINIC | Age: 30
End: 2020-10-09
Payer: COMMERCIAL

## 2020-10-09 ENCOUNTER — LAB ENCOUNTER (OUTPATIENT)
Dept: LAB | Age: 30
End: 2020-10-09
Attending: OBSTETRICS & GYNECOLOGY
Payer: COMMERCIAL

## 2020-10-09 VITALS
SYSTOLIC BLOOD PRESSURE: 114 MMHG | BODY MASS INDEX: 46 KG/M2 | DIASTOLIC BLOOD PRESSURE: 71 MMHG | WEIGHT: 267 LBS | HEART RATE: 83 BPM

## 2020-10-09 DIAGNOSIS — O20.9 VAGINAL BLEEDING IN PREGNANCY, FIRST TRIMESTER: ICD-10-CM

## 2020-10-09 DIAGNOSIS — O20.9 VAGINAL BLEEDING IN PREGNANCY, FIRST TRIMESTER: Primary | ICD-10-CM

## 2020-10-09 DIAGNOSIS — N92.6 IRREGULAR MENSES: ICD-10-CM

## 2020-10-09 PROCEDURE — 3078F DIAST BP <80 MM HG: CPT | Performed by: OBSTETRICS & GYNECOLOGY

## 2020-10-09 PROCEDURE — 86901 BLOOD TYPING SEROLOGIC RH(D): CPT

## 2020-10-09 PROCEDURE — 36415 COLL VENOUS BLD VENIPUNCTURE: CPT

## 2020-10-09 PROCEDURE — 86850 RBC ANTIBODY SCREEN: CPT

## 2020-10-09 PROCEDURE — 86900 BLOOD TYPING SEROLOGIC ABO: CPT

## 2020-10-09 PROCEDURE — 99213 OFFICE O/P EST LOW 20 MIN: CPT | Performed by: OBSTETRICS & GYNECOLOGY

## 2020-10-09 PROCEDURE — 81025 URINE PREGNANCY TEST: CPT | Performed by: OBSTETRICS & GYNECOLOGY

## 2020-10-09 PROCEDURE — 3074F SYST BP LT 130 MM HG: CPT | Performed by: OBSTETRICS & GYNECOLOGY

## 2020-10-09 PROCEDURE — 84702 CHORIONIC GONADOTROPIN TEST: CPT

## 2020-10-12 NOTE — TELEPHONE ENCOUNTER
Sent to [address] [place] [address] to review pt's hcg from 10/9/20, [address]. Pt wants to know next step. Pt has an appt with you at 10-15-20. Did you want to see the pt again or do you want pt to schedule her OBN appt next.

## 2020-10-12 NOTE — TELEPHONE ENCOUNTER
Patient completed lab and has appointment on Thursday 10/15, patient asking if anything further is required, please advise at:[phone],thanks.

## 2020-10-13 ENCOUNTER — LAB ENCOUNTER (OUTPATIENT)
Dept: LAB | Facility: HOSPITAL | Age: 30
End: 2020-10-13
Attending: OBSTETRICS & GYNECOLOGY
Payer: COMMERCIAL

## 2020-10-13 DIAGNOSIS — Z32.00 PREGNANCY EXAMINATION OR TEST, PREGNANCY UNCONFIRMED: ICD-10-CM

## 2020-10-13 PROCEDURE — 36415 COLL VENOUS BLD VENIPUNCTURE: CPT

## 2020-10-13 PROCEDURE — 84702 CHORIONIC GONADOTROPIN TEST: CPT

## 2020-10-15 ENCOUNTER — TELEPHONE (OUTPATIENT)
Dept: OBGYN CLINIC | Facility: CLINIC | Age: 30
End: 2020-10-15

## 2020-10-15 DIAGNOSIS — Z34.81 ENCOUNTER FOR SUPERVISION OF OTHER NORMAL PREGNANCY IN FIRST TRIMESTER: Primary | ICD-10-CM

## 2020-10-15 NOTE — H&P
HPI:  The patient is a 25 yo [id number] with susepcted LMP August 3rd presents with +HPT and spotting. Had 2 recent +HPTs. Leland dc 2 days ago that was unprovoked. No pain or bleeding now. NO menses in July. Normal Menses in May and June however.         R Alcohol/week: 0.0 standard drinks        Comment: SOCIALLY      Drug use: No      Sexual activity: Yes        Partners: Male        Birth control/protection: I.U.D., Paragard    Lifestyle      Physical activity        Days per week: Not on file        Tracy RASH      Review of Systems:  Constitutional:  Denies fevers and chills   Cardiovascular:  denies chest pain or palpitations  Respiratory:  denies shortness of breath  Gastrointestinal:  denies heartburn, abdominal pain, diarrhea or constipation  Genitouri

## 2020-10-15 NOTE — TELEPHONE ENCOUNTER
Pt scheduled pregnancy appt via [employer] on KEYLA's schedule. Pt needs OBN appt to start care. Per KEYLA, call pt to see if she is having bleeding or pain. If not, appt today not needed. Order an US for dating and help pt schedule obn.   Pt states she is not

## 2020-10-21 ENCOUNTER — HOSPITAL ENCOUNTER (OUTPATIENT)
Dept: ULTRASOUND IMAGING | Age: 30
Discharge: HOME | End: 2020-10-21
Attending: OBSTETRICS & GYNECOLOGY
Payer: COMMERCIAL

## 2020-10-21 DIAGNOSIS — Z34.81 ENCOUNTER FOR SUPERVISION OF OTHER NORMAL PREGNANCY IN FIRST TRIMESTER: ICD-10-CM

## 2020-10-21 PROCEDURE — 76801 OB US < 14 WKS SINGLE FETUS: CPT | Performed by: OBSTETRICS & GYNECOLOGY

## 2020-10-26 ENCOUNTER — OFFICE VISIT (OUTPATIENT)
Dept: INTERNAL MEDICINE CLINIC | Facility: CLINIC | Age: 30
End: 2020-10-26
Payer: COMMERCIAL

## 2020-10-26 VITALS
HEIGHT: 64 IN | HEART RATE: 90 BPM | BODY MASS INDEX: 47.32 KG/M2 | WEIGHT: 277.19 LBS | SYSTOLIC BLOOD PRESSURE: 110 MMHG | TEMPERATURE: 97 F | OXYGEN SATURATION: 99 % | DIASTOLIC BLOOD PRESSURE: 72 MMHG | RESPIRATION RATE: 16 BRPM

## 2020-10-26 DIAGNOSIS — Z99.89 OSA ON CPAP: ICD-10-CM

## 2020-10-26 DIAGNOSIS — G47.33 OSA ON CPAP: ICD-10-CM

## 2020-10-26 DIAGNOSIS — E66.01 MORBID OBESITY WITH BMI OF 45.0-49.9, ADULT (HCC): ICD-10-CM

## 2020-10-26 DIAGNOSIS — Z3A.09 9 WEEKS GESTATION OF PREGNANCY: ICD-10-CM

## 2020-10-26 DIAGNOSIS — G43.C0 PERIODIC HEADACHE SYNDROME, NOT INTRACTABLE: Primary | ICD-10-CM

## 2020-10-26 PROCEDURE — 3078F DIAST BP <80 MM HG: CPT | Performed by: INTERNAL MEDICINE

## 2020-10-26 PROCEDURE — 3008F BODY MASS INDEX DOCD: CPT | Performed by: INTERNAL MEDICINE

## 2020-10-26 PROCEDURE — 99213 OFFICE O/P EST LOW 20 MIN: CPT | Performed by: INTERNAL MEDICINE

## 2020-10-26 PROCEDURE — 3074F SYST BP LT 130 MM HG: CPT | Performed by: INTERNAL MEDICINE

## 2020-10-26 NOTE — PROGRESS NOTES
HPI:    Patient ID: Jerrell Downing is a 26year old female. HPI     Follow-up and completion of FMLA form . History of migraine headache, DANIA on CPAP,obesity    Gained 10 lbs. Pregnant  9 weeks , sees Dr. Celi Mendes . DANIA.  Adherent to [employer] Morbid obesity with BMI of 40.0-44.9, adult ([facility] [address]    • Obesity    • Obesity (BMI 30-39. 9)       Past Surgical History:   Procedure Laterality Date   •         at [facility]   • COLPOSCOPY, CERVIX W/UPPER ADJACENT VAGINA; W/BIOPSY(S), CERV syndrome, not intractable  Acetaminophen 500 mg every 6 hours as needed for headache. FMLA form updated. Intermittent medical leave for flare. 2. DANIA on CPAP  Continue CPAP. No somnolence or fatigue reported    3. Morbid obesity with BMI of 45.0-49.

## 2020-10-28 PROBLEM — R60.0 LOWER EXTREMITY EDEMA: Status: RESOLVED | Noted: 2017-07-12 | Resolved: 2020-10-28

## 2020-10-28 PROBLEM — I10 HYPERTENSION: Status: RESOLVED | Noted: 2018-10-10 | Resolved: 2020-10-28

## 2020-10-28 PROBLEM — Z51.81 ENCOUNTER FOR THERAPEUTIC DRUG MONITORING: Status: RESOLVED | Noted: 2017-11-16 | Resolved: 2020-10-28

## 2020-10-28 PROBLEM — R42 DIZZINESS: Status: RESOLVED | Noted: 2017-07-12 | Resolved: 2020-10-28

## 2020-11-04 ENCOUNTER — NURSE ONLY (OUTPATIENT)
Dept: OBGYN CLINIC | Facility: CLINIC | Age: 30
End: 2020-11-04
Payer: COMMERCIAL

## 2020-11-04 DIAGNOSIS — Z34.81 ENCOUNTER FOR SUPERVISION OF OTHER NORMAL PREGNANCY IN FIRST TRIMESTER: Primary | ICD-10-CM

## 2020-11-04 RX ORDER — .ALPHA.-TOCOPHEROL ACETATE, DL-, ASCORBIC ACID, CHOLECALCIFEROL, CYANOCOBALAMIN, FOLIC ACID, FERROUS FUMARATE, CALCIUM PHOSPHATE, DIBASIC, ANHYDROUS, NIACINAMIDE, PYRIDOXINE HYDROCHLORIDE, RIBOFLAVIN, THIAMINE MONONITRATE, AND VITAMIN A ACETATE 15; 60; 400; 4.5; 1; 27; 50; 13.5; 1.05; 1.2; 1.05; 25 [IU]/1; MG/1; [IU]/1; UG/1; MG/1; MG/1; MG/1; MG/1; MG/1; MG/1; MG/1; [IU]/1
1 TABLET ORAL DAILY
Qty: 30 TABLET | Refills: 11 | Status: SHIPPED | OUTPATIENT
Start: 2020-11-04 | End: 2020-12-04

## 2020-11-04 RX ORDER — CHOLECALCIFEROL (VITAMIN D3) 25 MCG
1 TABLET,CHEWABLE ORAL DAILY
COMMUNITY
End: 2020-11-04

## 2020-11-04 NOTE — PROGRESS NOTES
Pt seen for OBN appt today with complaints of nausea. Pt given Vit B6 instructions. Normal PN labs, hep c and 1 hr gtt ordered. Pt advised all labs must be completed and resulted prior to MD appt. NPN appt with MD scheduled with CAP on 11/13/2020.     GRZEGORZ (or planned travel) to [facility] for self and or partner No    Pets No        GENETICS SCREENING    Genetic Screening    Genetic Screening/Teratology Counseling- Includes patient, baby's father, or anyone in either family with:  Patient's age 28 years or o

## 2020-11-09 ENCOUNTER — LAB ENCOUNTER (OUTPATIENT)
Dept: LAB | Facility: HOSPITAL | Age: 30
End: 2020-11-09
Attending: OBSTETRICS & GYNECOLOGY
Payer: COMMERCIAL

## 2020-11-09 DIAGNOSIS — Z34.81 ENCOUNTER FOR SUPERVISION OF OTHER NORMAL PREGNANCY IN FIRST TRIMESTER: ICD-10-CM

## 2020-11-09 PROCEDURE — 36415 COLL VENOUS BLD VENIPUNCTURE: CPT

## 2020-11-09 PROCEDURE — 86780 TREPONEMA PALLIDUM: CPT

## 2020-11-09 PROCEDURE — 86803 HEPATITIS C AB TEST: CPT

## 2020-11-09 PROCEDURE — 87340 HEPATITIS B SURFACE AG IA: CPT

## 2020-11-09 PROCEDURE — 82950 GLUCOSE TEST: CPT

## 2020-11-09 PROCEDURE — 87389 HIV-1 AG W/HIV-1&-2 AB AG IA: CPT

## 2020-11-09 PROCEDURE — 85025 COMPLETE CBC W/AUTO DIFF WBC: CPT

## 2020-11-09 PROCEDURE — 86762 RUBELLA ANTIBODY: CPT

## 2020-11-09 PROCEDURE — 86901 BLOOD TYPING SEROLOGIC RH(D): CPT

## 2020-11-09 PROCEDURE — 87086 URINE CULTURE/COLONY COUNT: CPT

## 2020-11-09 PROCEDURE — 86900 BLOOD TYPING SEROLOGIC ABO: CPT

## 2020-11-09 PROCEDURE — 86850 RBC ANTIBODY SCREEN: CPT

## 2020-11-15 ENCOUNTER — PATIENT MESSAGE (OUTPATIENT)
Dept: OBGYN CLINIC | Facility: CLINIC | Age: 30
End: 2020-11-15

## 2020-11-16 NOTE — TELEPHONE ENCOUNTER
From: Felicita Lomeli  To: Nasrin Serna MD  Sent: 11/15/2020 3:03 PM CST  Subject: Other    Hello I'm back from the hospital and was hoping that a nurse from your office could call and set up another appointment for the one I missed on Friday.  Please

## 2020-11-16 NOTE — TELEPHONE ENCOUNTER
Called pt and scheduled NPN appt with CAP on Mon, 11-23-20. Encouraged to call back with any questions or concerns.

## 2020-11-17 ENCOUNTER — OFFICE VISIT (OUTPATIENT)
Dept: INTERNAL MEDICINE CLINIC | Facility: CLINIC | Age: 30
End: 2020-11-17
Payer: COMMERCIAL

## 2020-11-17 VITALS
BODY MASS INDEX: 46.95 KG/M2 | HEART RATE: 84 BPM | WEIGHT: 275 LBS | HEIGHT: 64 IN | SYSTOLIC BLOOD PRESSURE: 120 MMHG | TEMPERATURE: 97 F | OXYGEN SATURATION: 99 % | DIASTOLIC BLOOD PRESSURE: 80 MMHG

## 2020-11-17 DIAGNOSIS — G47.33 OSA ON CPAP: ICD-10-CM

## 2020-11-17 DIAGNOSIS — Z99.89 OSA ON CPAP: ICD-10-CM

## 2020-11-17 DIAGNOSIS — M54.41 ACUTE RIGHT-SIDED LOW BACK PAIN WITH RIGHT-SIDED SCIATICA: Primary | ICD-10-CM

## 2020-11-17 DIAGNOSIS — Z3A.12 12 WEEKS GESTATION OF PREGNANCY: ICD-10-CM

## 2020-11-17 PROCEDURE — 99072 ADDL SUPL MATRL&STAF TM PHE: CPT | Performed by: INTERNAL MEDICINE

## 2020-11-17 PROCEDURE — 3008F BODY MASS INDEX DOCD: CPT | Performed by: INTERNAL MEDICINE

## 2020-11-17 PROCEDURE — 3074F SYST BP LT 130 MM HG: CPT | Performed by: INTERNAL MEDICINE

## 2020-11-17 PROCEDURE — 3079F DIAST BP 80-89 MM HG: CPT | Performed by: INTERNAL MEDICINE

## 2020-11-17 PROCEDURE — 99214 OFFICE O/P EST MOD 30 MIN: CPT | Performed by: INTERNAL MEDICINE

## 2020-11-17 NOTE — PROGRESS NOTES
HPI:    Patient ID: Arnol Johnson is a 26year old female. Low Back Pain  This is a new (5 days ago ) problem. Pertinent negatives include no abdominal pain, bladder incontinence, chest pain, dysuria or fever. could not get up from toilet.  , radiat • Obesity    • Obesity (BMI 30-39. 9)       Past Surgical History:   Procedure Laterality Date   •         at [facility], operated by [facility]   •       2014   • COLPOSCOPY, CERVIX W/UPPER ADJACENT VAGINA; W/BIOPSY(S), CERVIX      colpo - 2013 Psychiatric: Judgment normal.              ASSESSMENT/PLAN:   1. Acute right-sided low back pain with right-sided sciatica  Improved with Medrol Dosepak. Takes Reida Situ as needed for severe pain. NO NSAID. Heating pad alternate with cold compress.   Avoid

## 2020-11-18 NOTE — PATIENT INSTRUCTIONS
Sciatica     Sciatica is a condition that causes pain in the lower back that spreads down into the buttock, hip, and leg. Sometimes the leg pain can happen without any back pain.  Sciatica happens when a spinal nerve is irritated or has pressure put on it · Use heat from a hot shower, hot bath, or heating pad to help ease pain. Massage can also help. You can also try using an ice pack. You can make your own ice pack by putting ice cubes in a plastic bag. Wrap the bag in a thin towel.  Try both heat and cold

## 2020-11-20 ENCOUNTER — TELEPHONE (OUTPATIENT)
Dept: PHYSICAL THERAPY | Age: 30
End: 2020-11-20

## 2020-11-23 ENCOUNTER — TELEPHONE (OUTPATIENT)
Dept: OBGYN CLINIC | Facility: CLINIC | Age: 30
End: 2020-11-23

## 2020-11-23 ENCOUNTER — OFFICE VISIT (OUTPATIENT)
Dept: PHYSICAL THERAPY | Facility: HOSPITAL | Age: 30
End: 2020-11-23
Attending: INTERNAL MEDICINE
Payer: OTHER MISCELLANEOUS

## 2020-11-23 ENCOUNTER — INITIAL PRENATAL (OUTPATIENT)
Dept: OBGYN CLINIC | Facility: CLINIC | Age: 30
End: 2020-11-23
Payer: COMMERCIAL

## 2020-11-23 VITALS
SYSTOLIC BLOOD PRESSURE: 137 MMHG | WEIGHT: 271 LBS | HEART RATE: 98 BPM | DIASTOLIC BLOOD PRESSURE: 84 MMHG | BODY MASS INDEX: 47 KG/M2

## 2020-11-23 DIAGNOSIS — Z34.91 ENCOUNTER FOR SUPERVISION OF NORMAL PREGNANCY IN FIRST TRIMESTER, UNSPECIFIED GRAVIDITY: Primary | ICD-10-CM

## 2020-11-23 DIAGNOSIS — Z36.9 FIRST TRIMESTER SCREENING: Primary | ICD-10-CM

## 2020-11-23 DIAGNOSIS — M54.41 ACUTE RIGHT-SIDED LOW BACK PAIN WITH RIGHT-SIDED SCIATICA: ICD-10-CM

## 2020-11-23 PROBLEM — Z88.3: Status: ACTIVE | Noted: 2020-11-23

## 2020-11-23 PROBLEM — A60.00 HERPES GENITALIA: Status: ACTIVE | Noted: 2020-11-23

## 2020-11-23 PROBLEM — Z98.891 PREVIOUS CESAREAN SECTION: Status: ACTIVE | Noted: 2020-11-23

## 2020-11-23 PROBLEM — M54.50 LOW BACK PAIN: Status: ACTIVE | Noted: 2020-11-23

## 2020-11-23 PROBLEM — O09.299 HX OF PREECLAMPSIA, PRIOR PREGNANCY, CURRENTLY PREGNANT: Status: ACTIVE | Noted: 2020-11-23

## 2020-11-23 PROCEDURE — 3079F DIAST BP 80-89 MM HG: CPT | Performed by: OBSTETRICS & GYNECOLOGY

## 2020-11-23 PROCEDURE — 81002 URINALYSIS NONAUTO W/O SCOPE: CPT | Performed by: OBSTETRICS & GYNECOLOGY

## 2020-11-23 PROCEDURE — 97112 NEUROMUSCULAR REEDUCATION: CPT

## 2020-11-23 PROCEDURE — 3075F SYST BP GE 130 - 139MM HG: CPT | Performed by: OBSTETRICS & GYNECOLOGY

## 2020-11-23 PROCEDURE — 97162 PT EVAL MOD COMPLEX 30 MIN: CPT

## 2020-11-23 PROCEDURE — 76815 OB US LIMITED FETUS(S): CPT | Performed by: OBSTETRICS & GYNECOLOGY

## 2020-11-23 NOTE — PROGRESS NOTES
States has sciatic/lower back pain- PT 2x/wk- was hospitalized 2 weeks ago and could not move-morphine was given. H/o HSV no outbreaks since diagnosed 3-4 years ago, has betadine allergy.

## 2020-11-23 NOTE — PROGRESS NOTES
SPINE EVALUATION:   Referring Physician: Dr. Ada Murrieta  Diagnosis: Acute right-sided low back pain with right-sided sciatica (M54.41)     Date of Service: 11/23/2020   9 authorized.       PATIENT SUMMARY   Rio Moreno is a 26year old female who presents t transfers and pinching sensation of pain. Gotten better at standing from toilet. Had FWW for amb but hasn't been using the last day or two, getting a bit better.  Leg pain was present at its onset and when in hospital, but once d/c from hospital pain was no N/T.    ASSESSMENT  Tarun Nieves presents to physical therapy evaluation with primary c/o acute R sided low back pain from a fall at work.  The results of the objective tests and measures show impaired lumbopelvic mechanics, mobility restrictions through the spin Response:   Pt education was provided on exam findings, treatment diagnosis, treatment plan, expectations, and prognosis.  Pt was also provided recommendations for activity modifications, possible soreness after evaluation, modalities as needed [ice/heat], include: Gait training, Manual Therapy, Neuromuscular Re-education, Therapeutic Activities, Therapeutic Exercise and Home Exercise Program instruction    Education or treatment limitation: None  Rehab Potential:good    TIAGO: 25/50    Patient/Family/Caregive

## 2020-11-25 ENCOUNTER — OFFICE VISIT (OUTPATIENT)
Dept: PHYSICAL THERAPY | Facility: HOSPITAL | Age: 30
End: 2020-11-25
Attending: INTERNAL MEDICINE
Payer: OTHER MISCELLANEOUS

## 2020-11-25 PROCEDURE — 97140 MANUAL THERAPY 1/> REGIONS: CPT

## 2020-11-25 PROCEDURE — 97112 NEUROMUSCULAR REEDUCATION: CPT

## 2020-11-25 PROCEDURE — 97110 THERAPEUTIC EXERCISES: CPT

## 2020-11-25 NOTE — PROGRESS NOTES
Dx: Acute right-sided low back pain with right-sided sciatica (M54.41)          Insurance (Authorized # of Visits):  [facility] 2/9 authorized.             Authorizing Physician: Dr. Treasure Ramos MD visit: none scheduled  Fall Risk: standard         Precautions: n/a in function. Plan: Patient will be seen for 1-2 x/week or a total of 12 visits over a 90 day period.  Treatment will include: Gait training, Manual Therapy, Neuromuscular Re-education, Therapeutic Activities, Therapeutic Exercise and Home Exercise Progr

## 2020-11-30 ENCOUNTER — OFFICE VISIT (OUTPATIENT)
Dept: PHYSICAL THERAPY | Facility: HOSPITAL | Age: 30
End: 2020-11-30
Attending: INTERNAL MEDICINE
Payer: OTHER MISCELLANEOUS

## 2020-11-30 PROCEDURE — 97112 NEUROMUSCULAR REEDUCATION: CPT

## 2020-11-30 PROCEDURE — 97140 MANUAL THERAPY 1/> REGIONS: CPT

## 2020-11-30 PROCEDURE — 97110 THERAPEUTIC EXERCISES: CPT

## 2020-11-30 NOTE — PROGRESS NOTES
Dx: Acute right-sided low back pain with right-sided sciatica (M54.41)          Insurance (Authorized # of Visits):  [facility] 3/9 authorized.             Authorizing Physician: Dr. Alma Ramos MD visit: none scheduled  Fall Risk: standard         Precautions: n/a instruction  Date: 11/25/2020  TX#: 2/9 auth Date:    11/30/2020           TX#: 3/9 Date:                 TX#: 4/ Date:                 TX#: 5/ Date:    Tx#: 6/   MT:   CPA gr I throughout lumbar spine, focus to lower lumbar  STM QL and paraspinals  10 min

## 2020-12-02 ENCOUNTER — OFFICE VISIT (OUTPATIENT)
Dept: PHYSICAL THERAPY | Facility: HOSPITAL | Age: 30
End: 2020-12-02
Attending: INTERNAL MEDICINE
Payer: OTHER MISCELLANEOUS

## 2020-12-02 PROCEDURE — 97140 MANUAL THERAPY 1/> REGIONS: CPT

## 2020-12-02 PROCEDURE — 97110 THERAPEUTIC EXERCISES: CPT

## 2020-12-02 NOTE — PROGRESS NOTES
Dx: Acute right-sided low back pain with right-sided sciatica (M54.41)          Insurance (Authorized # of Visits):  [facility] 4/9 authorized.             Authorizing Physician: Dr. Ada Ramos MD visit: 12/17  Fall Risk: standard         Precautions: n/a Re-education, Therapeutic Activities, Therapeutic Exercise and Home Exercise Program instruction  Date: 11/25/2020  TX#: 2/9 auth Date:    11/30/2020           TX#: 3/9 Date:   12/2/2020              TX#: 4/9 Date:                 TX#: 5/ Date:    Tx#: 6/

## 2020-12-03 ENCOUNTER — HOSPITAL ENCOUNTER (OUTPATIENT)
Dept: PERINATAL CARE | Facility: HOSPITAL | Age: 30
Discharge: HOME | End: 2020-12-03
Attending: OBSTETRICS & GYNECOLOGY
Payer: COMMERCIAL

## 2020-12-03 VITALS
WEIGHT: 271 LBS | HEART RATE: 103 BPM | SYSTOLIC BLOOD PRESSURE: 120 MMHG | BODY MASS INDEX: 47 KG/M2 | DIASTOLIC BLOOD PRESSURE: 78 MMHG

## 2020-12-03 DIAGNOSIS — O99.211 OBESITY AFFECTING PREGNANCY IN FIRST TRIMESTER: ICD-10-CM

## 2020-12-03 DIAGNOSIS — Z36.9 FIRST TRIMESTER SCREENING: Primary | ICD-10-CM

## 2020-12-03 DIAGNOSIS — E66.01 MORBID OBESITY DUE TO EXCESS CALORIES (HCC): ICD-10-CM

## 2020-12-03 DIAGNOSIS — Z98.891 PREVIOUS CESAREAN SECTION: ICD-10-CM

## 2020-12-03 DIAGNOSIS — O09.299 HX OF PREECLAMPSIA, PRIOR PREGNANCY, CURRENTLY PREGNANT: ICD-10-CM

## 2020-12-03 DIAGNOSIS — Z36.9 FIRST TRIMESTER SCREENING: ICD-10-CM

## 2020-12-03 PROCEDURE — 76813 OB US NUCHAL MEAS 1 GEST: CPT | Performed by: OBSTETRICS & GYNECOLOGY

## 2020-12-03 PROCEDURE — 99244 OFF/OP CNSLTJ NEW/EST MOD 40: CPT | Performed by: OBSTETRICS & GYNECOLOGY

## 2020-12-07 ENCOUNTER — OFFICE VISIT (OUTPATIENT)
Dept: PHYSICAL THERAPY | Facility: HOSPITAL | Age: 30
End: 2020-12-07
Attending: INTERNAL MEDICINE
Payer: OTHER MISCELLANEOUS

## 2020-12-07 PROCEDURE — 97110 THERAPEUTIC EXERCISES: CPT

## 2020-12-07 NOTE — PROGRESS NOTES
Dx: Acute right-sided low back pain with right-sided sciatica (M54.41)          Insurance (Authorized # of Visits):  [employer] [address] 5/9 authorized.             Authorizing Physician: Dr. Leah Ramos MD visit: 12/17  Fall Risk: standard         Precautions: n/a Patient will demonstrate lumbopelvic control and glute strength to lift objects without pain. Patient will improve TIAGO by 13 points to demonstrate significant change in function.      Plan: step ups   Patient will be seen for 1-2 x/week or a total of 12

## 2020-12-08 ENCOUNTER — LAB ENCOUNTER (OUTPATIENT)
Dept: LAB | Facility: HOSPITAL | Age: 30
End: 2020-12-08
Attending: OBSTETRICS & GYNECOLOGY
Payer: COMMERCIAL

## 2020-12-08 DIAGNOSIS — Z20.822 ENCOUNTER FOR LABORATORY TESTING FOR COVID-19 VIRUS: ICD-10-CM

## 2020-12-09 ENCOUNTER — APPOINTMENT (OUTPATIENT)
Dept: PHYSICAL THERAPY | Facility: HOSPITAL | Age: 30
End: 2020-12-09
Attending: INTERNAL MEDICINE
Payer: OTHER MISCELLANEOUS

## 2020-12-09 ENCOUNTER — TELEPHONE (OUTPATIENT)
Dept: PHYSICAL THERAPY | Facility: HOSPITAL | Age: 30
End: 2020-12-09

## 2020-12-10 ENCOUNTER — TELEPHONE (OUTPATIENT)
Dept: PERINATAL CARE | Facility: HOSPITAL | Age: 30
End: 2020-12-10

## 2020-12-10 ENCOUNTER — TELEPHONE (OUTPATIENT)
Dept: OBGYN CLINIC | Facility: CLINIC | Age: 30
End: 2020-12-10

## 2020-12-10 DIAGNOSIS — U07.1 COVID-19 AFFECTING PREGNANCY IN SECOND TRIMESTER: Primary | ICD-10-CM

## 2020-12-10 DIAGNOSIS — O98.512 COVID-19 AFFECTING PREGNANCY IN SECOND TRIMESTER: Primary | ICD-10-CM

## 2020-12-10 RX ORDER — ENOXAPARIN SODIUM 100 MG/ML
40 INJECTION SUBCUTANEOUS DAILY
Qty: 35 SYRINGE | Refills: 0 | Status: SHIPPED | OUTPATIENT
Start: 2020-12-10 | End: 2021-01-14

## 2020-12-10 RX ORDER — ENOXAPARIN SODIUM 100 MG/ML
40 INJECTION SUBCUTANEOUS DAILY
Status: DISCONTINUED | OUTPATIENT
Start: 2020-12-10 | End: 2020-12-10

## 2020-12-10 NOTE — TELEPHONE ENCOUNTER
Informed pt she will need to start daily Lovenox injections. Order placed. Pt informed we will send instructions via [employer] on how to administer. Pt states so far she, her , and one of her 2 kids are positive. Quarantine instructions given.  Assiste

## 2020-12-10 NOTE — TELEPHONE ENCOUNTER
Needs level 2 with MFM for +COVID19. Please wait to contact her until the RNs have discussed the results.

## 2020-12-10 NOTE — TELEPHONE ENCOUNTER
Pt 1st trimester screen results obt  Reviewed By MD olivia  Low risk for trisomy 18/13/21  Less than 1 in 08217 risk for trisomy 18/13  Less than 1 in 2470 risk for Trisomy 21    Report sent for scanning into pt record

## 2020-12-10 NOTE — TELEPHONE ENCOUNTER
Spoke to pt, she saw the + covid in [place]. Pt states she has a bad HA, cough, coughing up a lot of mucus, but overall feeling \"OK\". Pt aware she will need a Level 2 US and that [facility] will be contacting her soon.  Routed back to [facility] to please assist.

## 2020-12-12 ENCOUNTER — PATIENT MESSAGE (OUTPATIENT)
Dept: OBGYN CLINIC | Facility: CLINIC | Age: 30
End: 2020-12-12

## 2020-12-12 NOTE — TELEPHONE ENCOUNTER
From: [facility] Sidney ANNE  To: Ledy Nelson DO  Sent: 12/12/2020 11:33 AM CST  Subject: Other    Hello I'm positive for covid and have been for a few days.  Everyone in my house is getting my better but I have now lost taste, smell, fatigue and nauseou

## 2020-12-14 ENCOUNTER — APPOINTMENT (OUTPATIENT)
Dept: PHYSICAL THERAPY | Facility: HOSPITAL | Age: 30
End: 2020-12-14
Attending: INTERNAL MEDICINE
Payer: OTHER MISCELLANEOUS

## 2020-12-14 ENCOUNTER — TELEPHONE (OUTPATIENT)
Dept: PHYSICAL THERAPY | Facility: HOSPITAL | Age: 30
End: 2020-12-14

## 2020-12-14 NOTE — TELEPHONE ENCOUNTER
Called patient to check in as she had not shown to her 8:45 appointment today, states that she had tested positive for COVID.  She had spoken to her [facility]  and the plan as of this time is hold therapy, will follow up with physician on 12/28 where a dec

## 2020-12-16 ENCOUNTER — APPOINTMENT (OUTPATIENT)
Dept: PHYSICAL THERAPY | Facility: HOSPITAL | Age: 30
End: 2020-12-16
Attending: INTERNAL MEDICINE
Payer: OTHER MISCELLANEOUS

## 2020-12-23 ENCOUNTER — APPOINTMENT (OUTPATIENT)
Dept: PHYSICAL THERAPY | Facility: HOSPITAL | Age: 30
End: 2020-12-23
Attending: INTERNAL MEDICINE
Payer: OTHER MISCELLANEOUS

## 2020-12-28 ENCOUNTER — ROUTINE PRENATAL (OUTPATIENT)
Dept: OBGYN CLINIC | Facility: CLINIC | Age: 30
End: 2020-12-28
Payer: COMMERCIAL

## 2020-12-28 ENCOUNTER — OFFICE VISIT (OUTPATIENT)
Dept: INTERNAL MEDICINE CLINIC | Facility: CLINIC | Age: 30
End: 2020-12-28
Payer: COMMERCIAL

## 2020-12-28 ENCOUNTER — LAB ENCOUNTER (OUTPATIENT)
Dept: LAB | Facility: HOSPITAL | Age: 30
End: 2020-12-28
Attending: INTERNAL MEDICINE
Payer: COMMERCIAL

## 2020-12-28 VITALS
HEART RATE: 92 BPM | DIASTOLIC BLOOD PRESSURE: 74 MMHG | SYSTOLIC BLOOD PRESSURE: 118 MMHG | BODY MASS INDEX: 46 KG/M2 | WEIGHT: 269.63 LBS

## 2020-12-28 VITALS
SYSTOLIC BLOOD PRESSURE: 132 MMHG | OXYGEN SATURATION: 99 % | HEART RATE: 99 BPM | WEIGHT: 270 LBS | DIASTOLIC BLOOD PRESSURE: 74 MMHG | HEIGHT: 64 IN | BODY MASS INDEX: 46.1 KG/M2

## 2020-12-28 DIAGNOSIS — Z34.82 ENCOUNTER FOR SUPERVISION OF OTHER NORMAL PREGNANCY IN SECOND TRIMESTER: Primary | ICD-10-CM

## 2020-12-28 DIAGNOSIS — S39.012D STRAIN OF LUMBAR REGION, SUBSEQUENT ENCOUNTER: ICD-10-CM

## 2020-12-28 DIAGNOSIS — U07.1 COVID-19 VIRUS INFECTION: Primary | ICD-10-CM

## 2020-12-28 DIAGNOSIS — Z3A.17 17 WEEKS GESTATION OF PREGNANCY: ICD-10-CM

## 2020-12-28 DIAGNOSIS — U07.1 COVID-19 VIRUS INFECTION: ICD-10-CM

## 2020-12-28 PROCEDURE — 3075F SYST BP GE 130 - 139MM HG: CPT | Performed by: INTERNAL MEDICINE

## 2020-12-28 PROCEDURE — 3074F SYST BP LT 130 MM HG: CPT | Performed by: OBSTETRICS & GYNECOLOGY

## 2020-12-28 PROCEDURE — 3008F BODY MASS INDEX DOCD: CPT | Performed by: INTERNAL MEDICINE

## 2020-12-28 PROCEDURE — 3078F DIAST BP <80 MM HG: CPT | Performed by: OBSTETRICS & GYNECOLOGY

## 2020-12-28 PROCEDURE — 81002 URINALYSIS NONAUTO W/O SCOPE: CPT | Performed by: OBSTETRICS & GYNECOLOGY

## 2020-12-28 PROCEDURE — 99213 OFFICE O/P EST LOW 20 MIN: CPT | Performed by: INTERNAL MEDICINE

## 2020-12-28 PROCEDURE — 3078F DIAST BP <80 MM HG: CPT | Performed by: INTERNAL MEDICINE

## 2020-12-28 RX ORDER — HYDROCODONE BITARTRATE AND ACETAMINOPHEN 5; 325 MG/1; MG/1
TABLET ORAL EVERY 6 HOURS PRN
COMMUNITY
Start: 2020-11-15 | End: 2021-03-03

## 2020-12-28 NOTE — PROGRESS NOTES
Pt states she never took Lovenox. Reviewed why we recommend lovenox but would not start it at this time since 3 weeks out. She is feeling better. US scheduled for 1/28. No bleeding. Feels fetal movement.    RTC 4 wks

## 2020-12-28 NOTE — PROGRESS NOTES
HPI:    Patient ID: Andry Moraes is a 26year old female. HPI     This visit is conducted using Telemedicine with live, interactive  video.      Patient understands and accepts financial responsibility for any deductible, co-insurance and/or co-pay congestion. Eyes: Negative for visual disturbance. Respiratory: Negative for chest tightness and shortness of breath. Gastrointestinal: Negative for abdominal pain and diarrhea. Genitourinary: Negative for dysuria.    Musculoskeletal: Negative for encounter  Improved.  Clear to resume full duty     3. 17 weeks gestation of pregnancy  Gyne following       Orders Placed This Encounter      Asymptomatic Covid-19 Testing by PCR ([facility]) [E]

## 2020-12-29 NOTE — PATIENT INSTRUCTIONS
Coronavirus Disease 2019 (COVID-19): Pregnancy and Childbirth    If you’re pregnant or just had a baby, you likely have many questions about how COVID-19 could affect you and your child.  Researchers are still learning more about how the virus affects pre · Miscarriage and stillbirth have also happened with MERS and earlier SARS. But experts don’t yet know if these are a risk with COVID-19. Is it safe to keep my healthcare appointments?   Your healthcare team may change some of your appointments to a phone If you have COVID-19 and are in labor, call your healthcare provider and delivery unit before you arrive. Your hospital or birthing center will take steps to protect people around you from infection.  You will need to wear a medical mask covering your nose You will need to self-isolate to limit contact with your baby. You will need to wear a mask and wear clean clothes when holding or feeding your baby. Wear the mask so that it covers both your nose and mouth.  You can pump breastmilk and store it to maintain © 5654-8089 [employer] [address] [facility], [facility] [place]. All rights reserved. This information is not intended as a substitute for professional medical care. Always follow your healthcare professional's instructions.

## 2020-12-30 LAB — SARS-COV-2 BY PCR: NOT DETECTED

## 2021-01-21 NOTE — PROGRESS NOTES
Lauren Irvin     Dear Dr. Valente Pratt,     Thank you for requesting ultrasound evaluation and maternal fetal medicine consultation on your patient David Patel.   As you are aware she is a 32/30 year old female [id number] with a DISCUSSION  During her visit we discussed and reviewed the following issues:  First trimester screen -we reviewed her low risk first trimester screen     OBESITY:  Her BMI prior to pregnancy was 47.5  Obesity during pregnancy is associated with numerous pregnancy (immune system, cardiovascular and pulmonary) make it plausible that women could be at risk for severe respiratory complications. Women should take all the same precautions being asked of the whole population to reduce their risk for infection. severe preeclampsia  · COVID infection in the second trimester     RECOMMENDATIONS:  · Continue care with Dr. Radha Toussaint  · Monthly growth ultrasound starting at 26 weeks  · Weekly NST's at 36 weeks  · Low-dose aspirin, 1.5 tablets or 120 mg daily until 37 weeks

## 2021-01-26 ENCOUNTER — ROUTINE PRENATAL (OUTPATIENT)
Dept: OBGYN CLINIC | Facility: CLINIC | Age: 31
End: 2021-01-26
Payer: COMMERCIAL

## 2021-01-26 VITALS
HEART RATE: 90 BPM | WEIGHT: 276.63 LBS | BODY MASS INDEX: 47 KG/M2 | DIASTOLIC BLOOD PRESSURE: 79 MMHG | SYSTOLIC BLOOD PRESSURE: 122 MMHG

## 2021-01-26 DIAGNOSIS — Z34.82 ENCOUNTER FOR SUPERVISION OF OTHER NORMAL PREGNANCY IN SECOND TRIMESTER: Primary | ICD-10-CM

## 2021-01-26 LAB
MULTISTIX EXPIRATION DATE: NORMAL DATE
MULTISTIX LOT#: 1044 NUMERIC
PH, URINE: 6.5 (ref 4.5–8)
SPECIFIC GRAVITY: 1.01 (ref 1–1.03)
UROBILINOGEN,SEMI-QN: 0.2 MG/DL (ref 0–1.9)

## 2021-01-26 PROCEDURE — 3074F SYST BP LT 130 MM HG: CPT | Performed by: OBSTETRICS & GYNECOLOGY

## 2021-01-26 PROCEDURE — 3078F DIAST BP <80 MM HG: CPT | Performed by: OBSTETRICS & GYNECOLOGY

## 2021-01-26 PROCEDURE — 81002 URINALYSIS NONAUTO W/O SCOPE: CPT | Performed by: OBSTETRICS & GYNECOLOGY

## 2021-01-28 ENCOUNTER — HOSPITAL ENCOUNTER (OUTPATIENT)
Dept: PERINATAL CARE | Facility: HOSPITAL | Age: 31
Discharge: HOME | End: 2021-01-28
Attending: OBSTETRICS & GYNECOLOGY
Payer: COMMERCIAL

## 2021-01-28 VITALS
DIASTOLIC BLOOD PRESSURE: 71 MMHG | HEART RATE: 93 BPM | WEIGHT: 276 LBS | BODY MASS INDEX: 47 KG/M2 | SYSTOLIC BLOOD PRESSURE: 131 MMHG

## 2021-01-28 DIAGNOSIS — U07.1 COVID-19 AFFECTING PREGNANCY IN SECOND TRIMESTER: ICD-10-CM

## 2021-01-28 DIAGNOSIS — G47.33 OSA ON CPAP: ICD-10-CM

## 2021-01-28 DIAGNOSIS — Z98.891 PREVIOUS CESAREAN SECTION: ICD-10-CM

## 2021-01-28 DIAGNOSIS — O98.512 COVID-19 AFFECTING PREGNANCY IN SECOND TRIMESTER: ICD-10-CM

## 2021-01-28 DIAGNOSIS — O09.299 HX OF PREECLAMPSIA, PRIOR PREGNANCY, CURRENTLY PREGNANT: ICD-10-CM

## 2021-01-28 DIAGNOSIS — E66.01 MORBID OBESITY DUE TO EXCESS CALORIES (HCC): ICD-10-CM

## 2021-01-28 DIAGNOSIS — Z99.89 OSA ON CPAP: ICD-10-CM

## 2021-01-28 DIAGNOSIS — O09.622 HIGH-RISK PREGNANCY, YOUNG MULTIGRAVIDA IN SECOND TRIMESTER: ICD-10-CM

## 2021-01-28 DIAGNOSIS — E66.01 MORBID OBESITY DUE TO EXCESS CALORIES (HCC): Primary | ICD-10-CM

## 2021-01-28 PROCEDURE — 76811 OB US DETAILED SNGL FETUS: CPT | Performed by: OBSTETRICS & GYNECOLOGY

## 2021-01-28 PROCEDURE — 99215 OFFICE O/P EST HI 40 MIN: CPT | Performed by: OBSTETRICS & GYNECOLOGY

## 2021-02-08 NOTE — PROGRESS NOTES
Has MFM appt in 2 days. Never took any Lovenox for (+) COVID. Only taking bASA occas. Declines BTL at time of rcSx.  RTC 4 wks

## 2021-02-17 NOTE — PROGRESS NOTES
Nnamdi Bonilla  Dear Dr. Radha Toussaint,     Thank you for requesting ultrasound evaluation and maternal fetal medicine consultation on your patient aJmilah Oro you are aware she is a [age] year old female [id number] WN a  07 -  a ? 26-28 weeks due to severe preeclampsia. Leatha [place] was only 15 oz and was in the NICU for ~6 weeks. Alexander Langley has no sequelae of prematurity.  Regardless of whether the baby was 26 weeks or 28 weeks, the pregnancy was complicated by IUG approximate physician face-to-face time was 20 minutes.

## 2021-02-24 ENCOUNTER — ROUTINE PRENATAL (OUTPATIENT)
Dept: OBGYN CLINIC | Facility: CLINIC | Age: 31
End: 2021-02-24
Payer: COMMERCIAL

## 2021-02-24 VITALS
BODY MASS INDEX: 47 KG/M2 | DIASTOLIC BLOOD PRESSURE: 80 MMHG | SYSTOLIC BLOOD PRESSURE: 131 MMHG | WEIGHT: 273 LBS | HEART RATE: 97 BPM

## 2021-02-24 DIAGNOSIS — Z34.82 ENCOUNTER FOR SUPERVISION OF OTHER NORMAL PREGNANCY IN SECOND TRIMESTER: Primary | ICD-10-CM

## 2021-02-24 LAB
APPEARANCE: CLEAR
MULTISTIX LOT#: 5077 NUMERIC
URINE-COLOR: YELLOW

## 2021-02-24 PROCEDURE — 81002 URINALYSIS NONAUTO W/O SCOPE: CPT | Performed by: OBSTETRICS & GYNECOLOGY

## 2021-02-24 PROCEDURE — 3075F SYST BP GE 130 - 139MM HG: CPT | Performed by: OBSTETRICS & GYNECOLOGY

## 2021-02-24 PROCEDURE — 3079F DIAST BP 80-89 MM HG: CPT | Performed by: OBSTETRICS & GYNECOLOGY

## 2021-02-25 ENCOUNTER — HOSPITAL ENCOUNTER (OUTPATIENT)
Dept: PERINATAL CARE | Facility: HOSPITAL | Age: 31
Discharge: HOME | End: 2021-02-25
Attending: OBSTETRICS & GYNECOLOGY
Payer: COMMERCIAL

## 2021-02-25 VITALS
WEIGHT: 273 LBS | SYSTOLIC BLOOD PRESSURE: 139 MMHG | DIASTOLIC BLOOD PRESSURE: 74 MMHG | BODY MASS INDEX: 47 KG/M2 | HEART RATE: 85 BPM

## 2021-02-25 DIAGNOSIS — E66.01 MORBID OBESITY DUE TO EXCESS CALORIES (HCC): Primary | ICD-10-CM

## 2021-02-25 DIAGNOSIS — O98.512 COVID-19 AFFECTING PREGNANCY IN SECOND TRIMESTER: ICD-10-CM

## 2021-02-25 DIAGNOSIS — O09.299 HX OF PREECLAMPSIA, PRIOR PREGNANCY, CURRENTLY PREGNANT: ICD-10-CM

## 2021-02-25 DIAGNOSIS — E66.01 MORBID OBESITY DUE TO EXCESS CALORIES (HCC): ICD-10-CM

## 2021-02-25 DIAGNOSIS — U07.1 COVID-19 AFFECTING PREGNANCY IN SECOND TRIMESTER: ICD-10-CM

## 2021-02-25 PROCEDURE — 76816 OB US FOLLOW-UP PER FETUS: CPT | Performed by: OBSTETRICS & GYNECOLOGY

## 2021-02-25 PROCEDURE — 99213 OFFICE O/P EST LOW 20 MIN: CPT | Performed by: OBSTETRICS & GYNECOLOGY

## 2021-02-28 ENCOUNTER — PATIENT MESSAGE (OUTPATIENT)
Dept: OBGYN CLINIC | Facility: CLINIC | Age: 31
End: 2021-02-28

## 2021-02-28 ENCOUNTER — HOSPITAL ENCOUNTER (EMERGENCY)
Facility: HOSPITAL | Age: 31
Discharge: STILL ADMITTED OR ADMINISTRATIVE | End: 2021-02-28
Payer: COMMERCIAL

## 2021-02-28 ENCOUNTER — HOSPITAL ENCOUNTER (OUTPATIENT)
Facility: HOSPITAL | Age: 31
Setting detail: OBSERVATION
Discharge: HOME | End: 2021-02-28
Attending: OBSTETRICS & GYNECOLOGY | Admitting: OBSTETRICS & GYNECOLOGY
Payer: COMMERCIAL

## 2021-02-28 VITALS
BODY MASS INDEX: 46.95 KG/M2 | HEIGHT: 64 IN | DIASTOLIC BLOOD PRESSURE: 84 MMHG | HEART RATE: 88 BPM | WEIGHT: 275 LBS | SYSTOLIC BLOOD PRESSURE: 132 MMHG

## 2021-02-28 DIAGNOSIS — O16.2 ELEVATED BLOOD PRESSURE AFFECTING PREGNANCY IN SECOND TRIMESTER, ANTEPARTUM: Primary | ICD-10-CM

## 2021-02-28 PROBLEM — Z34.90 PREGNANCY: Status: ACTIVE | Noted: 2021-02-28

## 2021-02-28 LAB
ALBUMIN SERPL-MCNC: 2.5 G/DL (ref 3.4–5)
ALBUMIN/GLOB SERPL: 0.6 {RATIO} (ref 1–2)
ALP LIVER SERPL-CCNC: 76 U/L
ALT SERPL-CCNC: 13 U/L
ANION GAP SERPL CALC-SCNC: 5 MMOL/L (ref 0–18)
AST SERPL-CCNC: 13 U/L (ref 15–37)
BASOPHILS # BLD AUTO: 0.01 X10(3) UL (ref 0–0.2)
BASOPHILS NFR BLD AUTO: 0.1 %
BILIRUB SERPL-MCNC: 0.1 MG/DL (ref 0.1–2)
BUN BLD-MCNC: 8 MG/DL (ref 7–18)
BUN/CREAT SERPL: 11 (ref 10–20)
CALCIUM BLD-MCNC: 8.9 MG/DL (ref 8.5–10.1)
CHLORIDE SERPL-SCNC: 109 MMOL/L (ref 98–112)
CO2 SERPL-SCNC: 26 MMOL/L (ref 21–32)
CREAT BLD-MCNC: 0.73 MG/DL
CREAT UR-SCNC: 188 MG/DL
DEPRECATED RDW RBC AUTO: 45.3 FL (ref 35.1–46.3)
EOSINOPHIL # BLD AUTO: 0.17 X10(3) UL (ref 0–0.7)
EOSINOPHIL NFR BLD AUTO: 2.3 %
ERYTHROCYTE [DISTWIDTH] IN BLOOD BY AUTOMATED COUNT: 15 % (ref 11–15)
GLOBULIN PLAS-MCNC: 4.2 G/DL (ref 2.8–4.4)
GLUCOSE BLD-MCNC: 119 MG/DL (ref 70–99)
HCT VFR BLD AUTO: 32.8 %
HGB BLD-MCNC: 10.9 G/DL
IMM GRANULOCYTES # BLD AUTO: 0.02 X10(3) UL (ref 0–1)
IMM GRANULOCYTES NFR BLD: 0.3 %
LYMPHOCYTES # BLD AUTO: 1.57 X10(3) UL (ref 1–4)
LYMPHOCYTES NFR BLD AUTO: 21.3 %
M PROTEIN MFR SERPL ELPH: 6.7 G/DL (ref 6.4–8.2)
MCH RBC QN AUTO: 27.7 PG (ref 26–34)
MCHC RBC AUTO-ENTMCNC: 33.2 G/DL (ref 31–37)
MCV RBC AUTO: 83.2 FL
MONOCYTES # BLD AUTO: 0.47 X10(3) UL (ref 0.1–1)
MONOCYTES NFR BLD AUTO: 6.4 %
NEUTROPHILS # BLD AUTO: 5.12 X10 (3) UL (ref 1.5–7.7)
NEUTROPHILS # BLD AUTO: 5.12 X10(3) UL (ref 1.5–7.7)
NEUTROPHILS NFR BLD AUTO: 69.6 %
OSMOLALITY SERPL CALC.SUM OF ELEC: 289 MOSM/KG (ref 275–295)
PLATELET # BLD AUTO: 199 10(3)UL (ref 150–450)
POTASSIUM SERPL-SCNC: 3.6 MMOL/L (ref 3.5–5.1)
PROT UR-MCNC: 20.2 MG/DL
PROT/CREAT UR-RTO: 0.11
RBC # BLD AUTO: 3.94 X10(6)UL
SODIUM SERPL-SCNC: 140 MMOL/L (ref 136–145)
WBC # BLD AUTO: 7.4 X10(3) UL (ref 4–11)

## 2021-02-28 PROCEDURE — 36415 COLL VENOUS BLD VENIPUNCTURE: CPT

## 2021-02-28 PROCEDURE — 99213 OFFICE O/P EST LOW 20 MIN: CPT

## 2021-02-28 PROCEDURE — 84156 ASSAY OF PROTEIN URINE: CPT | Performed by: OBSTETRICS & GYNECOLOGY

## 2021-02-28 PROCEDURE — 85025 COMPLETE CBC W/AUTO DIFF WBC: CPT | Performed by: OBSTETRICS & GYNECOLOGY

## 2021-02-28 PROCEDURE — 82570 ASSAY OF URINE CREATININE: CPT | Performed by: OBSTETRICS & GYNECOLOGY

## 2021-02-28 PROCEDURE — 80053 COMPREHEN METABOLIC PANEL: CPT | Performed by: OBSTETRICS & GYNECOLOGY

## 2021-02-28 RX ORDER — SODIUM CHLORIDE, SODIUM LACTATE, POTASSIUM CHLORIDE, CALCIUM CHLORIDE 600; 310; 30; 20 MG/100ML; MG/100ML; MG/100ML; MG/100ML
INJECTION, SOLUTION INTRAVENOUS CONTINUOUS
Status: DISCONTINUED | OUTPATIENT
Start: 2021-02-28 | End: 2021-02-28

## 2021-02-28 RX ORDER — ACETAMINOPHEN 500 MG
1000 TABLET ORAL EVERY 6 HOURS PRN
Status: DISCONTINUED | OUTPATIENT
Start: 2021-02-28 | End: 2021-02-28

## 2021-02-28 RX ORDER — ACETAMINOPHEN 500 MG
TABLET ORAL
Status: COMPLETED
Start: 2021-02-28 | End: 2021-02-28

## 2021-02-28 NOTE — ED INITIAL ASSESSMENT (HPI)
Pt states 25 wk gestation . States headache since last night and high blood pressures. History of Preeclampsia.

## 2021-02-28 NOTE — ED NOTES
Spoke with Sarkis Ybarra, [facility] . Pt to go straight to [facility].

## 2021-03-01 ENCOUNTER — PATIENT MESSAGE (OUTPATIENT)
Dept: OBGYN CLINIC | Facility: CLINIC | Age: 31
End: 2021-03-01

## 2021-03-01 ENCOUNTER — APPOINTMENT (OUTPATIENT)
Dept: LAB | Facility: HOSPITAL | Age: 31
End: 2021-03-01
Attending: INTERNAL MEDICINE
Payer: COMMERCIAL

## 2021-03-01 LAB
M PROTEIN 24H UR ELPH-MRATE: 283.2 MG/24 HR (ref ?–149.1)
SPECIMEN VOL UR: 2400 ML

## 2021-03-01 PROCEDURE — 84156 ASSAY OF PROTEIN URINE: CPT | Performed by: OBSTETRICS & GYNECOLOGY

## 2021-03-01 NOTE — TELEPHONE ENCOUNTER
Assisted pt with scheduling appt with KEYLA on 3/3/2021 at 1040am at [place] [address]. Pt verbalized understanding.

## 2021-03-01 NOTE — PROGRESS NOTES
Pt is a 33year old female admitted to TR2/TR2-A. Patient presents with:  R/o Pih: c/o headache since midnight and continued to worsen,, Pt took own BP  140/99 around 1500     Pt is  26w2d intra-uterine pregnancy.   History obtained, consents gay

## 2021-03-01 NOTE — TELEPHONE ENCOUNTER
From: Jasmine Rowe  To: Nasrin Serna MD  Sent: 2/28/2021 9:38 AM CST  Subject: Other    Hello I'm 25 wks pregnant with a past history of preeclampsia and my hands are swollen were they hurt by touch and if I bend them.  I have no blood pressure cu

## 2021-03-01 NOTE — TELEPHONE ENCOUNTER
From: Lee Conception  To: Vamsi Trejo DO  Sent: 2/28/2021 2:44 AM CST  Subject: Other    Hello I'm having severe swelling in my hands and minor swelling in my feet. I don't know what my blood pressure is because I don't have a machine at home.  I

## 2021-03-03 ENCOUNTER — LAB ENCOUNTER (OUTPATIENT)
Dept: LAB | Facility: HOSPITAL | Age: 31
End: 2021-03-03
Attending: OBSTETRICS & GYNECOLOGY
Payer: COMMERCIAL

## 2021-03-03 ENCOUNTER — ROUTINE PRENATAL (OUTPATIENT)
Dept: OBGYN CLINIC | Facility: CLINIC | Age: 31
End: 2021-03-03
Payer: COMMERCIAL

## 2021-03-03 VITALS
BODY MASS INDEX: 47 KG/M2 | DIASTOLIC BLOOD PRESSURE: 81 MMHG | WEIGHT: 276.38 LBS | HEART RATE: 94 BPM | SYSTOLIC BLOOD PRESSURE: 137 MMHG

## 2021-03-03 DIAGNOSIS — Z34.82 ENCOUNTER FOR SUPERVISION OF OTHER NORMAL PREGNANCY IN SECOND TRIMESTER: ICD-10-CM

## 2021-03-03 DIAGNOSIS — Z34.82 ENCOUNTER FOR SUPERVISION OF OTHER NORMAL PREGNANCY IN SECOND TRIMESTER: Primary | ICD-10-CM

## 2021-03-03 LAB
APPEARANCE: CLEAR
DEPRECATED RDW RBC AUTO: 46.5 FL (ref 35.1–46.3)
ERYTHROCYTE [DISTWIDTH] IN BLOOD BY AUTOMATED COUNT: 15.4 % (ref 11–15)
GLUCOSE 1H P GLC SERPL-MCNC: 165 MG/DL
HCT VFR BLD AUTO: 33.7 %
HGB BLD-MCNC: 10.7 G/DL
MCH RBC QN AUTO: 26.3 PG (ref 26–34)
MCHC RBC AUTO-ENTMCNC: 31.8 G/DL (ref 31–37)
MCV RBC AUTO: 82.8 FL
MULTISTIX LOT#: 5077 NUMERIC
PH, URINE: 6.5 (ref 4.5–8)
PLATELET # BLD AUTO: 189 10(3)UL (ref 150–450)
RBC # BLD AUTO: 4.07 X10(6)UL
SPECIFIC GRAVITY: 1.02 (ref 1–1.03)
URINE-COLOR: YELLOW
UROBILINOGEN,SEMI-QN: 0.2 MG/DL (ref 0–1.9)
WBC # BLD AUTO: 6.4 X10(3) UL (ref 4–11)

## 2021-03-03 PROCEDURE — 82950 GLUCOSE TEST: CPT

## 2021-03-03 PROCEDURE — 85027 COMPLETE CBC AUTOMATED: CPT

## 2021-03-03 PROCEDURE — 3075F SYST BP GE 130 - 139MM HG: CPT | Performed by: OBSTETRICS & GYNECOLOGY

## 2021-03-03 PROCEDURE — 81002 URINALYSIS NONAUTO W/O SCOPE: CPT | Performed by: OBSTETRICS & GYNECOLOGY

## 2021-03-03 PROCEDURE — 36415 COLL VENOUS BLD VENIPUNCTURE: CPT

## 2021-03-03 PROCEDURE — 3079F DIAST BP 80-89 MM HG: CPT | Performed by: OBSTETRICS & GYNECOLOGY

## 2021-03-03 RX ORDER — ASPIRIN 81 MG/1
TABLET, CHEWABLE ORAL DAILY
Status: ON HOLD | COMMUNITY
End: 2021-04-14

## 2021-03-03 NOTE — PROGRESS NOTES
[facility] triage follow up. Was at [facility] 2/28 for HA and elevated BP . BPs while there 130-150s/70-90s. Had normal PC ratio and subsequent 24h urine protein. She has been checking BPs at home and reported as 130s/80s.   Pt to get BP cuff and check BID and send l

## 2021-03-04 ENCOUNTER — PATIENT MESSAGE (OUTPATIENT)
Dept: OBGYN CLINIC | Facility: CLINIC | Age: 31
End: 2021-03-04

## 2021-03-05 NOTE — TELEPHONE ENCOUNTER
26w6d. Pt saw [address] yesterday. KEYLA's notes stated the below. [facility] triage follow up. Was at [facility] 2/28 for HA and elevated BP . BPs while there 130-150s/70-90s. Had normal PC ratio and subsequent 24h urine protein.   She has been checking BPs at home and

## 2021-03-08 ENCOUNTER — OFFICE VISIT (OUTPATIENT)
Dept: INTERNAL MEDICINE CLINIC | Facility: CLINIC | Age: 31
End: 2021-03-08
Payer: COMMERCIAL

## 2021-03-08 ENCOUNTER — TELEPHONE (OUTPATIENT)
Dept: OBGYN CLINIC | Facility: CLINIC | Age: 31
End: 2021-03-08

## 2021-03-08 ENCOUNTER — LABORATORY ENCOUNTER (OUTPATIENT)
Dept: LAB | Facility: HOSPITAL | Age: 31
End: 2021-03-08
Attending: OBSTETRICS & GYNECOLOGY
Payer: COMMERCIAL

## 2021-03-08 VITALS
OXYGEN SATURATION: 98 % | DIASTOLIC BLOOD PRESSURE: 80 MMHG | WEIGHT: 278 LBS | HEART RATE: 88 BPM | SYSTOLIC BLOOD PRESSURE: 120 MMHG | BODY MASS INDEX: 47.46 KG/M2 | HEIGHT: 64 IN

## 2021-03-08 DIAGNOSIS — M54.41 ACUTE MIDLINE LOW BACK PAIN WITH RIGHT-SIDED SCIATICA: Primary | ICD-10-CM

## 2021-03-08 DIAGNOSIS — Z34.82 ENCOUNTER FOR SUPERVISION OF OTHER NORMAL PREGNANCY IN SECOND TRIMESTER: ICD-10-CM

## 2021-03-08 DIAGNOSIS — O24.410 DIET CONTROLLED GESTATIONAL DIABETES MELLITUS (GDM) IN SECOND TRIMESTER: Primary | ICD-10-CM

## 2021-03-08 DIAGNOSIS — S60.212A CONTUSION OF LEFT WRIST, INITIAL ENCOUNTER: ICD-10-CM

## 2021-03-08 PROBLEM — O24.419 GESTATIONAL DIABETES: Status: ACTIVE | Noted: 2021-03-08

## 2021-03-08 LAB
1 HR GLUCOSE GESTATIONAL: 169 MG/DL
GLUCOSE 1H P GLC SERPL-MCNC: 173 MG/DL
GLUCOSE 3H P GLC SERPL-MCNC: 162 MG/DL
GLUCOSE P FAST SERPL-MCNC: 94 MG/DL

## 2021-03-08 PROCEDURE — 82952 GTT-ADDED SAMPLES: CPT

## 2021-03-08 PROCEDURE — 82951 GLUCOSE TOLERANCE TEST (GTT): CPT

## 2021-03-08 PROCEDURE — 3008F BODY MASS INDEX DOCD: CPT | Performed by: INTERNAL MEDICINE

## 2021-03-08 PROCEDURE — 99214 OFFICE O/P EST MOD 30 MIN: CPT | Performed by: INTERNAL MEDICINE

## 2021-03-08 PROCEDURE — 36415 COLL VENOUS BLD VENIPUNCTURE: CPT

## 2021-03-08 PROCEDURE — 3079F DIAST BP 80-89 MM HG: CPT | Performed by: INTERNAL MEDICINE

## 2021-03-08 PROCEDURE — 3074F SYST BP LT 130 MM HG: CPT | Performed by: INTERNAL MEDICINE

## 2021-03-08 NOTE — TELEPHONE ENCOUNTER
----- Message from Veronica Taylor DO sent at 3/8/2021  2:49 PM CST -----  C/w GDM. Needs to see diabetic educators.

## 2021-03-08 NOTE — PROGRESS NOTES
HPI:    Patient ID: Terri Hong is a 33year old female. HPI     Patient is pregnant    She slipped in the rest room due to slippery floor while at work on  3/1/21. There was water on the floor.  This happened while trying to close the door in the 8/2013      No family history on file.    Social History: Social History    Tobacco Use      Smoking status: Never Smoker      Smokeless tobacco: Never Used    Vaping Use      Vaping Use: Never used    Alcohol use: Not Currently      Alcohol/week: 0.0 stand regular rhythm. Pulses: Normal pulses. Pulmonary:      Effort: Pulmonary effort is normal.      Breath sounds: Normal breath sounds.    Abdominal:      General: Bowel sounds are normal.      Comments: Gravid uterus    Musculoskeletal:      Cervical b

## 2021-03-10 ENCOUNTER — TELEPHONE (OUTPATIENT)
Dept: OBGYN CLINIC | Facility: CLINIC | Age: 31
End: 2021-03-10

## 2021-03-10 ENCOUNTER — ROUTINE PRENATAL (OUTPATIENT)
Dept: OBGYN CLINIC | Facility: CLINIC | Age: 31
End: 2021-03-10
Payer: COMMERCIAL

## 2021-03-10 ENCOUNTER — PATIENT MESSAGE (OUTPATIENT)
Dept: OBGYN CLINIC | Facility: CLINIC | Age: 31
End: 2021-03-10

## 2021-03-10 VITALS
WEIGHT: 276.81 LBS | BODY MASS INDEX: 48 KG/M2 | DIASTOLIC BLOOD PRESSURE: 77 MMHG | SYSTOLIC BLOOD PRESSURE: 136 MMHG | HEART RATE: 103 BPM

## 2021-03-10 DIAGNOSIS — Z01.818 PRE-OP TESTING: Primary | ICD-10-CM

## 2021-03-10 DIAGNOSIS — Z34.82 ENCOUNTER FOR SUPERVISION OF OTHER NORMAL PREGNANCY IN SECOND TRIMESTER: Primary | ICD-10-CM

## 2021-03-10 LAB
APPEARANCE: CLEAR
MULTISTIX LOT#: 5077 NUMERIC
PH, URINE: 6.5 (ref 4.5–8)
URINE-COLOR: YELLOW
UROBILINOGEN,SEMI-QN: 0.2 MG/DL (ref 0–1.9)

## 2021-03-10 PROCEDURE — 3078F DIAST BP <80 MM HG: CPT | Performed by: OBSTETRICS & GYNECOLOGY

## 2021-03-10 PROCEDURE — 81002 URINALYSIS NONAUTO W/O SCOPE: CPT | Performed by: OBSTETRICS & GYNECOLOGY

## 2021-03-10 PROCEDURE — 3075F SYST BP GE 130 - 139MM HG: CPT | Performed by: OBSTETRICS & GYNECOLOGY

## 2021-03-10 PROCEDURE — 59425 ANTEPARTUM CARE ONLY: CPT | Performed by: OBSTETRICS & GYNECOLOGY

## 2021-03-10 NOTE — PATIENT INSTRUCTIONS
Back Pain (Acute or Chronic)     Back pain is one of the most common problems. The good news is that most people feel better in 1 to 2 weeks, and most of the rest in 1 to 2 months. Most people can remain active.   People who have pain describe it differen this home care advice:  · When in bed, try to find a position of comfort. A firm mattress is best. Try lying flat on your back with pillows under your knees.  You can also try lying on your side with your knees bent up toward your chest and a pillow between gastrointestinal bleeding, or are taking blood thinners, talk to your doctor before taking any medicine. · Be careful if you are given a prescription medicines, narcotics, or medicine for muscle spasms.  They can cause drowsiness, affect your coordination,

## 2021-03-10 NOTE — TELEPHONE ENCOUNTER
OB GYN SURGICAL SCHEDULING    Assessment: Previous CS x 2    Operative Procedure: repeat  section    In / on: 39 weeks    Date:  21 with MD on call    Admission: am admit     Anesthesia: spinal    Additional Orders:  routine    Comments / Liv Frias

## 2021-03-10 NOTE — TELEPHONE ENCOUNTER
From: Brigette Salazar  To:  America Mills DO  Sent: 3/10/2021 12:54 PM CST  Subject: Other    1st wk of blood pressure

## 2021-03-10 NOTE — PROGRESS NOTES
No issues. Dx with GDM. Has Diabetic ed tomorrow. Encouraged to send BS log in 1 week after starting to check them via [facility]. Needs to send in BP log today. BPs at home 130s/70s per pt. Has MFM growth next week. RTC 2 wks.

## 2021-03-10 NOTE — TELEPHONE ENCOUNTER
Spoke to pt. Aware section is scheduled on Friday,05/28/21 at 930am    Called and spoke to Eunice will call back with assist    Labor and delivery instructions sent, antimicrobial wash instructions sent , and enhanced recovery instructions sent.     Message se

## 2021-03-11 ENCOUNTER — HOSPITAL ENCOUNTER (OUTPATIENT)
Dept: ENDOCRINOLOGY | Facility: HOSPITAL | Age: 31
Discharge: HOME | End: 2021-03-11
Attending: OBSTETRICS & GYNECOLOGY
Payer: COMMERCIAL

## 2021-03-11 DIAGNOSIS — O24.410 DIET CONTROLLED GESTATIONAL DIABETES MELLITUS (GDM) IN THIRD TRIMESTER: Primary | ICD-10-CM

## 2021-03-11 RX ORDER — BLOOD SUGAR DIAGNOSTIC
1 STRIP MISCELLANEOUS 4 TIMES DAILY
Qty: 100 STRIP | Refills: 3 | Status: ON HOLD | OUTPATIENT
Start: 2021-03-11 | End: 2021-04-14

## 2021-03-11 RX ORDER — BLOOD-GLUCOSE METER
1 EACH MISCELLANEOUS DAILY
Qty: 1 KIT | Refills: 0 | Status: SHIPPED | OUTPATIENT
Start: 2021-03-11 | End: 2021-11-30

## 2021-03-11 NOTE — PROGRESS NOTES
Devika Austin  : 1990 was seen for Gestational Diabetes Counseling: Group  Realtime audio-video visit via WebEx  Individual visit due to COVID 19 risk   Pt verbally consents to this audio-video visit.   3rd pregnancy, did not have GDM in previo testing fasting glucose and 2 hours after meals   3. Bring glucose log to each MD office visit. 4. Encouraged activity if no restrictions. 5. Encouraged [facility] to call diabetes center with any questions or concerns.     Patient verbalized understanding

## 2021-03-12 ENCOUNTER — TELEPHONE (OUTPATIENT)
Dept: INTERNAL MEDICINE CLINIC | Facility: CLINIC | Age: 31
End: 2021-03-12

## 2021-03-12 NOTE — TELEPHONE ENCOUNTER
Late entry for 3/10 spoke to [address] [place] [address] about pts blood pressure log, KEYLA stated would have an MD group discuss on how to proceed with patient care. Logs left with KEYLA.

## 2021-03-12 NOTE — TELEPHONE ENCOUNTER
Medical records release form received from:      [address] [employer]  [address], [address] [facility]    And sent to stat scan

## 2021-03-15 ENCOUNTER — PATIENT MESSAGE (OUTPATIENT)
Dept: OBGYN CLINIC | Facility: CLINIC | Age: 31
End: 2021-03-15

## 2021-03-15 NOTE — TELEPHONE ENCOUNTER
Pt is currently 28w3d. Pt has MFM growth US on 3/25/2021. At visit with Lakia Rogers on 3/10, pt told [address] was this week. Message ot Lakia Rogers. [facility] for MFM growth US on 3/25/2021? Pt will be 29w6d that day. Or mcgregor pt need it sooner.

## 2021-03-15 NOTE — TELEPHONE ENCOUNTER
From: Gertrude Calvert  To:  Larryice [place],   Sent: 3/15/2021 10:19 AM CDT  Subject: Non-Urgent Medical Question    Hello I informed Dr. Cynthia Collazo that my next ultrasound was this week when actually it's not till the 24th at [address] [place] [address]. [facility]

## 2021-03-16 ENCOUNTER — PATIENT MESSAGE (OUTPATIENT)
Dept: OBGYN CLINIC | Facility: CLINIC | Age: 31
End: 2021-03-16

## 2021-03-16 DIAGNOSIS — O24.414 INSULIN CONTROLLED GESTATIONAL DIABETES MELLITUS (GDM) IN THIRD TRIMESTER: Primary | ICD-10-CM

## 2021-03-17 ENCOUNTER — TELEPHONE (OUTPATIENT)
Dept: NEUROLOGY | Facility: CLINIC | Age: 31
End: 2021-03-17

## 2021-03-17 ENCOUNTER — OFFICE VISIT (OUTPATIENT)
Dept: NEUROLOGY | Facility: CLINIC | Age: 31
End: 2021-03-17
Payer: COMMERCIAL

## 2021-03-17 ENCOUNTER — HOSPITAL ENCOUNTER (INPATIENT)
Facility: HOSPITAL | Age: 31
LOS: 28 days | Discharge: HOME | End: 2021-04-14
Attending: OBSTETRICS & GYNECOLOGY | Admitting: OBSTETRICS & GYNECOLOGY
Payer: COMMERCIAL

## 2021-03-17 ENCOUNTER — HOSPITAL ENCOUNTER (OUTPATIENT)
Dept: GENERAL RADIOLOGY | Facility: HOSPITAL | Age: 31
Discharge: HOME | End: 2021-03-17
Attending: PHYSICAL MEDICINE & REHABILITATION
Payer: COMMERCIAL

## 2021-03-17 ENCOUNTER — HOSPITAL ENCOUNTER (OUTPATIENT)
Dept: PERINATAL CARE | Facility: HOSPITAL | Age: 31
Setting detail: OBSERVATION
Discharge: HOME | End: 2021-03-17
Attending: OBSTETRICS & GYNECOLOGY
Payer: COMMERCIAL

## 2021-03-17 ENCOUNTER — HOSPITAL ENCOUNTER (OUTPATIENT)
Facility: HOSPITAL | Age: 31
Setting detail: OBSERVATION
Discharge: ANOTHER HOSPITAL | End: 2021-03-17
Attending: OBSTETRICS & GYNECOLOGY | Admitting: OBSTETRICS & GYNECOLOGY
Payer: COMMERCIAL

## 2021-03-17 VITALS — BODY MASS INDEX: 47.46 KG/M2 | WEIGHT: 278 LBS | HEIGHT: 64 IN

## 2021-03-17 VITALS — OXYGEN SATURATION: 100 % | SYSTOLIC BLOOD PRESSURE: 150 MMHG | DIASTOLIC BLOOD PRESSURE: 72 MMHG | HEART RATE: 94 BPM

## 2021-03-17 DIAGNOSIS — E66.01 MORBID OBESITY DUE TO EXCESS CALORIES (HCC): ICD-10-CM

## 2021-03-17 DIAGNOSIS — E66.01 MATERNAL MORBID OBESITY IN THIRD TRIMESTER, ANTEPARTUM (HCC): ICD-10-CM

## 2021-03-17 DIAGNOSIS — O24.414 INSULIN CONTROLLED GESTATIONAL DIABETES MELLITUS (GDM) IN THIRD TRIMESTER: ICD-10-CM

## 2021-03-17 DIAGNOSIS — S63.502A SPRAIN OF LEFT WRIST, INITIAL ENCOUNTER: ICD-10-CM

## 2021-03-17 DIAGNOSIS — U07.1 COVID-19 AFFECTING PREGNANCY IN SECOND TRIMESTER: ICD-10-CM

## 2021-03-17 DIAGNOSIS — O09.299 HX OF PREECLAMPSIA, PRIOR PREGNANCY, CURRENTLY PREGNANT: ICD-10-CM

## 2021-03-17 DIAGNOSIS — S63.502A SPRAIN OF LEFT WRIST, INITIAL ENCOUNTER: Primary | ICD-10-CM

## 2021-03-17 DIAGNOSIS — O99.213 MATERNAL MORBID OBESITY IN THIRD TRIMESTER, ANTEPARTUM (HCC): ICD-10-CM

## 2021-03-17 DIAGNOSIS — O24.419 GESTATIONAL DIABETES: ICD-10-CM

## 2021-03-17 DIAGNOSIS — O14.93 PREECLAMPSIA, THIRD TRIMESTER: Primary | ICD-10-CM

## 2021-03-17 DIAGNOSIS — E66.9 OBESITY (BMI 30-39.9): ICD-10-CM

## 2021-03-17 DIAGNOSIS — S33.5XXA LUMBAR SPRAIN, INITIAL ENCOUNTER: ICD-10-CM

## 2021-03-17 DIAGNOSIS — O41.03X0 OLIGOHYDRAMNIOS IN THIRD TRIMESTER, SINGLE OR UNSPECIFIED FETUS: ICD-10-CM

## 2021-03-17 DIAGNOSIS — O98.512 COVID-19 AFFECTING PREGNANCY IN SECOND TRIMESTER: ICD-10-CM

## 2021-03-17 DIAGNOSIS — O16.3 ELEVATED BLOOD PRESSURE AFFECTING PREGNANCY IN THIRD TRIMESTER, ANTEPARTUM: ICD-10-CM

## 2021-03-17 DIAGNOSIS — O09.299 HX OF PREECLAMPSIA, PRIOR PREGNANCY, CURRENTLY PREGNANT: Primary | ICD-10-CM

## 2021-03-17 PROBLEM — O34.219 PREGNANCY WITH HISTORY OF CESAREAN SECTION, ANTEPARTUM: Status: ACTIVE | Noted: 2021-03-17

## 2021-03-17 LAB
ALBUMIN SERPL-MCNC: 2.7 G/DL (ref 3.4–5)
ALBUMIN/GLOB SERPL: 0.6 {RATIO} (ref 1–2)
ALP LIVER SERPL-CCNC: 96 U/L
ALT SERPL-CCNC: 16 U/L
ANION GAP SERPL CALC-SCNC: 9 MMOL/L (ref 0–18)
AST SERPL-CCNC: 15 U/L (ref 15–37)
BASOPHILS # BLD AUTO: 0.01 X10(3) UL (ref 0–0.2)
BASOPHILS NFR BLD AUTO: 0.1 %
BILIRUB SERPL-MCNC: 0.2 MG/DL (ref 0.1–2)
BUN BLD-MCNC: 7 MG/DL (ref 7–18)
BUN/CREAT SERPL: 9.9 (ref 10–20)
CALCIUM BLD-MCNC: 9.6 MG/DL (ref 8.5–10.1)
CHLORIDE SERPL-SCNC: 107 MMOL/L (ref 98–112)
CO2 SERPL-SCNC: 24 MMOL/L (ref 21–32)
CREAT BLD-MCNC: 0.71 MG/DL
CREAT UR-SCNC: 217 MG/DL
DEPRECATED RDW RBC AUTO: 46.5 FL (ref 35.1–46.3)
EOSINOPHIL # BLD AUTO: 0.19 X10(3) UL (ref 0–0.7)
EOSINOPHIL NFR BLD AUTO: 2.8 %
ERYTHROCYTE [DISTWIDTH] IN BLOOD BY AUTOMATED COUNT: 15.5 % (ref 11–15)
GLOBULIN PLAS-MCNC: 4.4 G/DL (ref 2.8–4.4)
GLUCOSE BLD-MCNC: 99 MG/DL (ref 70–99)
GLUCOSE BLDC GLUCOMTR-MCNC: 91 MG/DL (ref 70–99)
HCT VFR BLD AUTO: 35.3 %
HGB BLD-MCNC: 11.6 G/DL
IMM GRANULOCYTES # BLD AUTO: 0.04 X10(3) UL (ref 0–1)
IMM GRANULOCYTES NFR BLD: 0.6 %
LYMPHOCYTES # BLD AUTO: 1.45 X10(3) UL (ref 1–4)
LYMPHOCYTES NFR BLD AUTO: 21.5 %
M PROTEIN MFR SERPL ELPH: 7.1 G/DL (ref 6.4–8.2)
MCH RBC QN AUTO: 27.5 PG (ref 26–34)
MCHC RBC AUTO-ENTMCNC: 32.9 G/DL (ref 31–37)
MCV RBC AUTO: 83.6 FL
MONOCYTES # BLD AUTO: 0.47 X10(3) UL (ref 0.1–1)
MONOCYTES NFR BLD AUTO: 7 %
NEUTROPHILS # BLD AUTO: 4.59 X10 (3) UL (ref 1.5–7.7)
NEUTROPHILS # BLD AUTO: 4.59 X10(3) UL (ref 1.5–7.7)
NEUTROPHILS NFR BLD AUTO: 68 %
OSMOLALITY SERPL CALC.SUM OF ELEC: 288 MOSM/KG (ref 275–295)
PLATELET # BLD AUTO: 203 10(3)UL (ref 150–450)
POTASSIUM SERPL-SCNC: 3.6 MMOL/L (ref 3.5–5.1)
PROT UR-MCNC: 21.9 MG/DL
PROT/CREAT UR-RTO: 0.1
RBC # BLD AUTO: 4.22 X10(6)UL
SODIUM SERPL-SCNC: 140 MMOL/L (ref 136–145)
WBC # BLD AUTO: 6.8 X10(3) UL (ref 4–11)

## 2021-03-17 PROCEDURE — 99222 1ST HOSP IP/OBS MODERATE 55: CPT | Performed by: OBSTETRICS & GYNECOLOGY

## 2021-03-17 PROCEDURE — 76816 OB US FOLLOW-UP PER FETUS: CPT | Performed by: OBSTETRICS & GYNECOLOGY

## 2021-03-17 PROCEDURE — 96376 TX/PRO/DX INJ SAME DRUG ADON: CPT

## 2021-03-17 PROCEDURE — 3008F BODY MASS INDEX DOCD: CPT | Performed by: PHYSICAL MEDICINE & REHABILITATION

## 2021-03-17 PROCEDURE — 73100 X-RAY EXAM OF WRIST: CPT | Performed by: PHYSICAL MEDICINE & REHABILITATION

## 2021-03-17 PROCEDURE — 99213 OFFICE O/P EST LOW 20 MIN: CPT | Performed by: PHYSICAL MEDICINE & REHABILITATION

## 2021-03-17 RX ORDER — ZOLPIDEM TARTRATE 5 MG/1
5 TABLET ORAL NIGHTLY PRN
Status: DISCONTINUED | OUTPATIENT
Start: 2021-03-17 | End: 2021-04-09

## 2021-03-17 RX ORDER — CALCIUM CARBONATE 200(500)MG
1000 TABLET,CHEWABLE ORAL
Status: DISCONTINUED | OUTPATIENT
Start: 2021-03-17 | End: 2021-04-14

## 2021-03-17 RX ORDER — ACETAMINOPHEN 500 MG
500 TABLET ORAL EVERY 6 HOURS PRN
Status: DISCONTINUED | OUTPATIENT
Start: 2021-03-17 | End: 2021-04-09

## 2021-03-17 RX ORDER — ACETAMINOPHEN 500 MG
1000 TABLET ORAL EVERY 6 HOURS PRN
Status: DISCONTINUED | OUTPATIENT
Start: 2021-03-17 | End: 2021-04-09

## 2021-03-17 RX ORDER — LABETALOL HYDROCHLORIDE 5 MG/ML
80 INJECTION, SOLUTION INTRAVENOUS ONCE AS NEEDED
Status: DISCONTINUED | OUTPATIENT
Start: 2021-03-17 | End: 2021-04-14

## 2021-03-17 RX ORDER — SODIUM CHLORIDE, SODIUM LACTATE, POTASSIUM CHLORIDE, CALCIUM CHLORIDE 600; 310; 30; 20 MG/100ML; MG/100ML; MG/100ML; MG/100ML
INJECTION, SOLUTION INTRAVENOUS CONTINUOUS
Status: DISCONTINUED | OUTPATIENT
Start: 2021-03-17 | End: 2021-03-25

## 2021-03-17 RX ORDER — DOCUSATE SODIUM 100 MG/1
100 CAPSULE, LIQUID FILLED ORAL 2 TIMES DAILY PRN
Status: DISCONTINUED | OUTPATIENT
Start: 2021-03-17 | End: 2021-04-11

## 2021-03-17 RX ORDER — DOCUSATE SODIUM 100 MG/1
100 CAPSULE, LIQUID FILLED ORAL 2 TIMES DAILY
Status: DISCONTINUED | OUTPATIENT
Start: 2021-03-17 | End: 2021-04-14

## 2021-03-17 RX ORDER — CALCIUM GLUCONATE 94 MG/ML
1 INJECTION, SOLUTION INTRAVENOUS ONCE
Status: DISCONTINUED | OUTPATIENT
Start: 2021-03-17 | End: 2021-03-17

## 2021-03-17 RX ORDER — SODIUM CHLORIDE, SODIUM LACTATE, POTASSIUM CHLORIDE, CALCIUM CHLORIDE 600; 310; 30; 20 MG/100ML; MG/100ML; MG/100ML; MG/100ML
INJECTION, SOLUTION INTRAVENOUS CONTINUOUS
Status: DISCONTINUED | OUTPATIENT
Start: 2021-03-17 | End: 2021-03-31

## 2021-03-17 RX ORDER — LABETALOL 200 MG/1
TABLET, FILM COATED ORAL
Status: COMPLETED
Start: 2021-03-17 | End: 2021-03-17

## 2021-03-17 RX ORDER — SODIUM CHLORIDE, SODIUM LACTATE, POTASSIUM CHLORIDE, CALCIUM CHLORIDE 600; 310; 30; 20 MG/100ML; MG/100ML; MG/100ML; MG/100ML
INJECTION, SOLUTION INTRAVENOUS CONTINUOUS
Status: DISCONTINUED | OUTPATIENT
Start: 2021-03-17 | End: 2021-03-17

## 2021-03-17 RX ORDER — BETAMETHASONE SODIUM PHOSPHATE AND BETAMETHASONE ACETATE 3; 3 MG/ML; MG/ML
INJECTION, SUSPENSION INTRA-ARTICULAR; INTRALESIONAL; INTRAMUSCULAR; SOFT TISSUE
Status: COMPLETED
Start: 2021-03-17 | End: 2021-03-17

## 2021-03-17 RX ORDER — LABETALOL 200 MG/1
200 TABLET, FILM COATED ORAL EVERY 12 HOURS SCHEDULED
Status: DISCONTINUED | OUTPATIENT
Start: 2021-03-17 | End: 2021-03-18

## 2021-03-17 RX ORDER — BETAMETHASONE SODIUM PHOSPHATE AND BETAMETHASONE ACETATE 3; 3 MG/ML; MG/ML
12 INJECTION, SUSPENSION INTRA-ARTICULAR; INTRALESIONAL; INTRAMUSCULAR; SOFT TISSUE ONCE
Status: COMPLETED | OUTPATIENT
Start: 2021-03-18 | End: 2021-03-18

## 2021-03-17 RX ORDER — LABETALOL HYDROCHLORIDE 5 MG/ML
40 INJECTION, SOLUTION INTRAVENOUS ONCE AS NEEDED
Status: DISCONTINUED | OUTPATIENT
Start: 2021-03-17 | End: 2021-04-14

## 2021-03-17 RX ORDER — BETAMETHASONE SODIUM PHOSPHATE AND BETAMETHASONE ACETATE 3; 3 MG/ML; MG/ML
12 INJECTION, SUSPENSION INTRA-ARTICULAR; INTRALESIONAL; INTRAMUSCULAR; SOFT TISSUE EVERY 24 HOURS
Status: DISCONTINUED | OUTPATIENT
Start: 2021-03-17 | End: 2021-03-17

## 2021-03-17 RX ORDER — LABETALOL HYDROCHLORIDE 5 MG/ML
20 INJECTION, SOLUTION INTRAVENOUS
Status: DISCONTINUED | OUTPATIENT
Start: 2021-03-17 | End: 2021-03-17

## 2021-03-17 RX ORDER — BETAMETHASONE SODIUM PHOSPHATE AND BETAMETHASONE ACETATE 3; 3 MG/ML; MG/ML
12 INJECTION, SUSPENSION INTRA-ARTICULAR; INTRALESIONAL; INTRAMUSCULAR; SOFT TISSUE ONCE
Status: DISCONTINUED | OUTPATIENT
Start: 2021-03-18 | End: 2021-03-17

## 2021-03-17 RX ORDER — LABETALOL HYDROCHLORIDE 5 MG/ML
INJECTION, SOLUTION INTRAVENOUS
Status: COMPLETED
Start: 2021-03-17 | End: 2021-03-18

## 2021-03-17 RX ORDER — HYDRALAZINE HYDROCHLORIDE 20 MG/ML
INJECTION INTRAMUSCULAR; INTRAVENOUS
Status: DISCONTINUED | OUTPATIENT
Start: 2021-03-17 | End: 2021-03-17

## 2021-03-17 RX ORDER — DEXTROSE MONOHYDRATE 25 G/50ML
50 INJECTION, SOLUTION INTRAVENOUS
Status: DISCONTINUED | OUTPATIENT
Start: 2021-03-17 | End: 2021-04-09

## 2021-03-17 RX ORDER — LABETALOL HYDROCHLORIDE 5 MG/ML
INJECTION, SOLUTION INTRAVENOUS
Status: COMPLETED
Start: 2021-03-17 | End: 2021-03-17

## 2021-03-17 RX ORDER — LABETALOL 200 MG/1
200 TABLET, FILM COATED ORAL EVERY 12 HOURS SCHEDULED
Status: DISCONTINUED | OUTPATIENT
Start: 2021-03-17 | End: 2021-03-17

## 2021-03-17 RX ORDER — LABETALOL HYDROCHLORIDE 5 MG/ML
20 INJECTION, SOLUTION INTRAVENOUS ONCE AS NEEDED
Status: COMPLETED | OUTPATIENT
Start: 2021-03-17 | End: 2021-03-18

## 2021-03-17 RX ORDER — HYDRALAZINE HYDROCHLORIDE 20 MG/ML
10 INJECTION INTRAMUSCULAR; INTRAVENOUS ONCE AS NEEDED
Status: COMPLETED | OUTPATIENT
Start: 2021-03-17 | End: 2021-03-18

## 2021-03-17 NOTE — H&P
[address] Patient Status:  Observation    1990 MRN [id number]   Location [address] [place] [address] Attending Stacey Diggs, DO   Hosp Day # 0 PCP Diaz Rodriguez MD     Tee Herpes genitalia            No outbreaks since initial 3-4 years ago.              Discussed suppression at 36             weeks if outbreak with pregnancy      Previous  section x2            Repeat at 39 weeks -- declines BTL      Allergy to [facility] COLPOSCOPY, CERVIX W/UPPER ADJACENT VAGINA; W/BIOPSY(S), CERVIX      colpo - 8/2013     Family History: No family history on file.   Social History: Social History    Tobacco Use      Smoking status: Never Smoker      Smokeless tobacco: Never Used    [employer] AST 15 15 - 37 U/L    Alkaline Phosphatase 96 37 - 98 U/L    Bilirubin, Total 0.2 0.1 - 2.0 mg/dL    Total Protein 7.1 6.4 - 8.2 g/dL    Albumin 2.7 (L) 3.4 - 5.0 g/dL    Globulin  4.4 2.8 - 4.4 g/dL    A/G Ratio 0.6 (L) 1.0 - 2.0   URINE PROTEIN/CREATININ

## 2021-03-17 NOTE — TELEPHONE ENCOUNTER
Left detailed message informing patient of imaging results and recommendation's per Dr. Ellie Mckee. Gave patient therapy phone number. Instructed patient to call back with questions.

## 2021-03-17 NOTE — TELEPHONE ENCOUNTER
NJG reviewed pts BS log and would like for pt to start insulin. 4 + 4 + 4 + 2 N. Pt informed of recs and verbalized understanding. Pt informed she will need to see DM ED to learn how to give self insulin injections. Order placed.  Pt provided with # to DM E

## 2021-03-17 NOTE — PROGRESS NOTES
Pt is a 33year old female admitted to 120/120-A. Patient presents with:   Complication: transported from [place] [address] for elevated blood pressures     Pt is [id number] 28w5d intra-uterine pregnancy. History obtained, consents signed.  Oriented to room

## 2021-03-17 NOTE — TELEPHONE ENCOUNTER
Please let the patient know that her wrist x-ray looks good. No fractures. This is good news. I recommend that she do occupational therapy.   She may set up here at [facility].

## 2021-03-17 NOTE — TELEPHONE ENCOUNTER
From: Oksana Gooden  To:  Sheryle Proud, DO  Sent: 3/16/2021 10:34 PM CDT  Subject: Other    Weekly BP checks

## 2021-03-17 NOTE — TRIAGE
[facility]      Triage Note    Sue Lucero Patient Status:  Observation    1990 MRN [id number]   Location [address] Attending Delores Duran, [address] [facility] Day # 0 PCP MD Montserrat Carrillo Test Second Interpretation: Appropriate for gestational age          FHR Category: Category I             Additional Comments Comments:  at 29 5/7 IUPdue to elevated B/p. Pt states good FM.  Pt states she has intermittent headaches, denies visual change

## 2021-03-17 NOTE — PROGRESS NOTES
[address]  Progress Note    CHIEF COMPLAINT:  Patient presents with:  Back Pain: Patient referred by Dr. Emmett Garcia for low back pain. Patient states that she had a fall at work 03/1/21 and hurt her back.        His clampsia during pregnancy and pt was induced to have her baby. • Low back pain during pregnancy in first trimester 11/2020    Hospitalized with severe low back pain in 1st trimester of pregnancy. Could not walk without morphine. Physical therapy.     • M Strength Since last Visit: denies  Tingling/Numbness: denies               PHYSICAL EXAM:   Ht 64\"   Wt 278 lb (126.1 kg)   LMP 08/03/2020 (Exact Date)   BMI 47.72 kg/m²     Body mass index is 47.72 kg/m².       General: No immediate distress  Head: Normoc unavailable, please consider the patient to be off work)   Lift/Carry: Maximum pounds = 5, no left hand work  Push/Pull: Maximum pounds = 5  Bend/Squat: Limit Repetitive Motion  Stand/Walk: Alternate Sit/Stand Every 30 min if needed  Safety: No Climbing /

## 2021-03-17 NOTE — TELEPHONE ENCOUNTER
NJG reviewed pts BP log and stated she needs to go to triage now for evaluation based on BPs consistently in 140s/90s. Pt also reports headaches. Pt informed of recs and will go to triage now. KEYLA on call notified.

## 2021-03-17 NOTE — CONSULTS
[facility]  Maternal-Fetal Medicine Inpatient Consultation    Date of Admission:  3/17/2021  Date of Consult:  3/17/2021    Reason for Consult:   A maternal-fetal medicine consultation was requested secondary to Preeclampsia, severe.     Histo injection 12 mg, 12 mg, Intramuscular, Once  •  docusate sodium (COLACE) cap 100 mg, 100 mg, Oral, BID PRN  •  Labetalol HCl (NORMODYNE) tab 200 mg, 200 mg, Oral, 2 times per day  •  acetaminophen (TYLENOL EXTRA STRENGTH) tab 500 mg, 500 mg, Oral, Q6H PRN standard drinks      Comment: SOCIALLY    Drug use: No         PHYSICAL EXAMINATION:  /82   Pulse 93   LMP 08/03/2020 (Exact Date)   SpO2 98%   Physical Exam  Constitutional:       Appearance: Normal appearance.    Abdominal:      Palpations: Abdomen or identifiable risk factors. Women with early, severe preeclampsia are at greatest risk of recurrence (25 to 72 percent), the incidence is much lower (5 to 7 percent) in women who had mild preeclampsia during the first pregnancy.              The ability Data from multiple observational studies suggest that preeclampsia predicts remote cardiovascular events (eg, hypertension, ischemic heart disease, stroke).  Review of all of a woman's pregnancies is necessary to define her long-term risk accura delivery is indicated at that time. ·  closely monitor the patient and manage her with antihypertensive therapy with a goal of delivery at 34 weeks or until she develops lab changes or symptoms.       Thank you for allowing me to participate in the care of

## 2021-03-17 NOTE — CONSULTS
Malik Denis Patient Status:  Observation    1990 MRN [id number]   Location [address]  Attending David Miller DO   Hosp Day # 0 PCP Ramy Garcia MD     SUBJECTIVE: colpo-8/2013. • Hypertension     Had pre clampsia during pregnancy and pt was induced to have her baby. • Morbid obesity with BMI of 40.0-44.9, adult ([facility] [address]    • Obesity    • Obesity (BMI 30-39. 9)    • Pregnancy-induced hypertension        Past Surgica 3 vessel cord is noted. Cardiac activity is present at 151 bpm    EFW 1175 g ( 2 lb 9 oz); 41%. MVP is 4.9 cm .  OLVIN 94.7 cm  Umbilical Artery Doppler is 2.42.     -----------------------------------------------------------    Data Review:   Lab Results the presence of the trophoblast, and impaired placental angiogenesis plays an important role. The clinical manifestations of preeclampsia can appear anytime from the second trimester to the first few weeks postpartum.  The majority of cases of preeclampsi injury to her and her baby. We talked about the increased and associated risk of fetal hyperinsulinemia, jaundice, electrolyte imbalance, seizure activity, IUFD and adverse outcome. We talked about her increased risk of having diabetes later in life.  The i

## 2021-03-17 NOTE — TELEPHONE ENCOUNTER
From: Jasmine Rowe  To: Lashae Kruse DO  Sent: 3/16/2021 11:19 PM CDT  Subject: Other    6 days of sugar log will be finish with the 7th day tomorrow.

## 2021-03-18 DIAGNOSIS — Z23 NEED FOR VACCINATION: ICD-10-CM

## 2021-03-18 PROCEDURE — 99233 SBSQ HOSP IP/OBS HIGH 50: CPT | Performed by: OBSTETRICS & GYNECOLOGY

## 2021-03-18 RX ORDER — NIFEDIPINE 10 MG/1
CAPSULE ORAL
Status: COMPLETED
Start: 2021-03-18 | End: 2021-03-18

## 2021-03-18 RX ORDER — BETAMETHASONE SODIUM PHOSPHATE AND BETAMETHASONE ACETATE 3; 3 MG/ML; MG/ML
INJECTION, SUSPENSION INTRA-ARTICULAR; INTRALESIONAL; INTRAMUSCULAR; SOFT TISSUE
Status: COMPLETED
Start: 2021-03-18 | End: 2021-03-18

## 2021-03-18 RX ORDER — CALCIUM GLUCONATE 94 MG/ML
1 INJECTION, SOLUTION INTRAVENOUS ONCE AS NEEDED
Status: DISCONTINUED | OUTPATIENT
Start: 2021-03-18 | End: 2021-04-09

## 2021-03-18 RX ORDER — HYDRALAZINE HYDROCHLORIDE 20 MG/ML
5 INJECTION INTRAMUSCULAR; INTRAVENOUS ONCE
Status: COMPLETED | OUTPATIENT
Start: 2021-03-18 | End: 2021-03-18

## 2021-03-18 RX ORDER — NIFEDIPINE 10 MG/1
10 CAPSULE ORAL ONCE
Status: COMPLETED | OUTPATIENT
Start: 2021-03-18 | End: 2021-03-18

## 2021-03-18 NOTE — PAYOR COMM NOTE
--------------  ADMISSION REVIEW     Payor: JONATHON RIOJAS  Subscriber #:  [id number]  Authorization Number: [id number]    Admit date: 3/17/21  Admit time:  3:57 PM       History & Physical    SUBJECTIVE:    Reason for Admission:  Severe HTN    History of Pres (36.3 °C)] 97.3 °F (36.3 °C)  Pulse:  [] 103  BP: (138-193)/(67-94) 153/89    Physical Exam:  General: Alert, orientated x3.  .  Vital Signs:  Blood pressure 153/89, pulse 103, temperature 97.3 °F (36.3 °C), last menstrual period 08/03/2020, SpO2 96 % Date Action Dose Route User            3/17/2021 1437 New Bag (none) Intravenous [place]      magnesium sulfate 40mg/ml infusion     Date Action Dose Route User            3/17/2021 1437 New Bag 2 g/hr Intravenous Luis Loera RN      [facility]

## 2021-03-18 NOTE — PROGRESS NOTES
Report received from [place]. Pt denies any complaints, +FM, denies lof,ctx, or bleeding. POC discussed with pt, pt denies questions. Call light within reach.

## 2021-03-18 NOTE — PLAN OF CARE
Problem: ANTEPARTUM/LABOR and DELIVERY  Goal: Maintain pregnancy as long as maternal and/or fetal condition is stable  Description: INTERVENTIONS:  - Maternal surveillance  - Fetal surveillance  - Monitor uterine activity  - Medications as ordered  - Bed Progressing  3/17/2021 2018 by Giana Garcia, RN  Outcome: Progressing  Goal: Patient/Family Short Term Goal  Description: Patient's Short Term Goal: Maintain BP within range    Interventions:     - See additional Care Plan goals for specific interventi

## 2021-03-18 NOTE — CONSULTS
[facility]    Maternal Fetal Medicine Progress Note    Carmen Frost Patient Status:  Inpatient    1990 MRN [id number]   Location [address] [facility] Attending Khanh Haji MD   Hosp Day # 1 PCP Marc Becker MD     SUBJECTIVE 1300 if patient remains stable  · Continue labetalol 200 mg twice daily  · Complete betamethasone course  · Levemir increased to 36 units at at bedtime; supplement postmeal elevations with sliding scale  · Complete 24-hour urine collection    Questions/con

## 2021-03-18 NOTE — H&P
[address] Patient Status:  Inpatient    1990 MRN [id number]   Location [address] [facility] Attending Abdoul Castano MD   Hosp Day # 0 PCP Amee Hurd MD     SUBJECTIVE:    Reason for Admis 26-28 wk. Delivered for severe preeclampsia, IUGR. • History of prior pregnancy with IUGR     • Hypertension     Had pre clampsia during pregnancy and pt was induced to have her baby.     • Low back pain during pregnancy in first trimester  term repeat . Dima Hameed son Guillaume Robles was a elective repeat . Efrain Farrell was not on low-dose aspirin with this pregnancy.  The father of her baby for her son is the same as this father the baby but different from her daughter's father.    Dhara Luu trimester     Pregnancy     Gestational diabetes     Elevated blood pressure affecting pregnancy in third trimester, antepartum     Pregnancy with history of  section, antepartum          PLAN:    PIH labs nl. Repeat in am. Insulin started.  Mg on 2

## 2021-03-19 PROBLEM — O14.93 PREECLAMPSIA, THIRD TRIMESTER: Status: ACTIVE | Noted: 2021-03-17

## 2021-03-19 PROBLEM — Z98.891 HISTORY OF 2 CESAREAN SECTIONS: Status: ACTIVE | Noted: 2020-11-23

## 2021-03-19 PROBLEM — O24.414 INSULIN CONTROLLED GESTATIONAL DIABETES MELLITUS (GDM) IN THIRD TRIMESTER: Status: ACTIVE | Noted: 2021-03-08

## 2021-03-19 PROCEDURE — 99231 SBSQ HOSP IP/OBS SF/LOW 25: CPT | Performed by: OBSTETRICS & GYNECOLOGY

## 2021-03-19 RX ORDER — NIFEDIPINE 30 MG/1
30 TABLET, EXTENDED RELEASE ORAL DAILY
Status: DISCONTINUED | OUTPATIENT
Start: 2021-03-19 | End: 2021-03-20

## 2021-03-19 RX ORDER — MELATONIN
325
Status: DISCONTINUED | OUTPATIENT
Start: 2021-03-19 | End: 2021-04-11

## 2021-03-19 RX ORDER — CHOLECALCIFEROL (VITAMIN D3) 25 MCG
1 TABLET,CHEWABLE ORAL EVERY 24 HOURS
Status: DISCONTINUED | OUTPATIENT
Start: 2021-03-19 | End: 2021-04-14

## 2021-03-19 NOTE — PROGRESS NOTES
OB progress    See prior note from today. Updates:  -elevated 24 hr urine protein -> dx Preeclampsia with intermittent severe range BP  -antiphospholipid antibody labs -> NEGATIVE    BP mild range on labetalol 300 mg PO BID & nifedipine 30 mg XR daily.

## 2021-03-19 NOTE — CONSULTS
[facility]    Maternal Fetal Medicine Progress Note    [facility]    1990 MRN [id number]   Location [address] [facility] Attending Feliz Randall MD   Hosp Day # 2 PCP Boby Lazaro MD     SUBJECTIVE 03/19/2021    K 4.0 03/19/2021     03/19/2021    CO2 21.0 03/19/2021     03/19/2021    CA 7.9 03/19/2021    ALB 2.6 03/19/2021    ALKPHO 83 03/19/2021    BILT 0.2 03/19/2021    TP 6.9 03/19/2021    AST 16 03/19/2021    ALT 16 03/19/2021

## 2021-03-19 NOTE — PAYOR COMM NOTE
--------------  CONTINUED STAY REVIEW    Payor: JONATHON RIOJAS  Subscriber #:  [id number]  Authorization Number: [id number]    Admit date: 3/17/21  Admit time:  3:57 PM    FAXING CLINICAL UPDATE FOR 3/19/21    3/19/21   OB progress note     A/P: 33year old G4 RLE, wearing SCDs     Component      Latest Ref Rng & Units 3/19/2021   WBC      4.0 - 11.0 x10(3) uL 12.5 (H)   RBC      3.80 - 5.30 x10(6)uL 3.88   Hemoglobin      12.0 - 16.0 g/dL 10.6 (L)   Hematocrit      35.0 - 48.0 % 32.7 (L)   Platelet Count      1 UNIT/ML flextouch 36 Units     Date Action Dose Route     3/18/2021 2147 Given 36 Units Subcutaneous (Right Upper Arm)       Labetalol HCl (TRANDATE) injection 20 mg     Date Action Dose Route     3/18/2021 1757 Given 20 mg Intravenous       lactated ringe

## 2021-03-19 NOTE — CONSULTS
I was asked to provide  consultation for severe pre-eclampsia at 28 weeks. Mom has h/o 425 gm 28 weeker 13 years ago at [facility]. I met [facility] with L&D RN and had full conversation about ramifications of extreme prematurity. Full note to follow.   I

## 2021-03-19 NOTE — PROGRESS NOTES
OB progress note  HD#1   A/P: 33year old [id number] at 32w0d - transfer from [place] on 2/17/2021 at 28w5d for elevated blood pressures - GHTN vs preeclampsia with some severe features (BP). H/o delivery at 26-28 wk of IUGR fetus for severe preeclampsia, m CC and HS       Exam:  Gen A&O, NAD  CV RRR, +0/4 systolic murmur  Lungs CTAB  Abd soft, obese, gravid, NT  Ext nontender, 2+ edema of LLE, 1+ edema of RLE, wearing SCDs    Component      Latest Ref Rng & Units 3/19/2021   WBC      4.0 - 11.0 x10(3) uL 12. Ratio      1.0 - 2.0 0.6 (L)   POC GLUCOSE      70 - 99 mg/dL 127 (H)   URIC ACID      2.6 - 6.0 mg/dL 5.4     Miguel Goodman MD

## 2021-03-19 NOTE — PROGRESS NOTES
HD#2  Patient has been doing well all day, off magnesium sulfate for ( since 1:15 pm) 6 hours, received 2 doses of steroids.  Around 5:30 pm BP spiked up to 202/96 - received 20 IV labetalol, 5 and 10 of hydralazine, Discussed with  - ja abernathy

## 2021-03-19 NOTE — PLAN OF CARE
Problem: ANTEPARTUM/LABOR and DELIVERY  Goal: Maintain pregnancy as long as maternal and/or fetal condition is stable  Description: INTERVENTIONS:  - Maternal surveillance  - Fetal surveillance  - Monitor uterine activity  - Medications as ordered  - Bed changes in respiratory status  - Assess for changes in mentation and behavior  - Position to facilitate oxygenation and minimize respiratory effort  - Oxygen supplementation based on oxygen saturation or ABGs  - Provide Smoking Cessation handout, if applic

## 2021-03-20 PROCEDURE — 99231 SBSQ HOSP IP/OBS SF/LOW 25: CPT | Performed by: OBSTETRICS & GYNECOLOGY

## 2021-03-20 RX ORDER — SODIUM CHLORIDE 0.9 % (FLUSH) 0.9 %
5 SYRINGE (ML) INJECTION AS NEEDED
Status: DISCONTINUED | OUTPATIENT
Start: 2021-03-20 | End: 2021-04-14

## 2021-03-20 RX ORDER — NIFEDIPINE 30 MG/1
30 TABLET, EXTENDED RELEASE ORAL 2 TIMES DAILY
Status: DISCONTINUED | OUTPATIENT
Start: 2021-03-20 | End: 2021-04-07

## 2021-03-20 NOTE — CONSULTS
Late entry – I saw mom 3/19 but was unable to post a note remotely until 3/20.      Patrick Lomeli   Neonatology  Consultation    Sulaiman YORKM: [facility] Gurjit holt  Counseling 2021    Baby's name will be Sofiya COVARRUBIASW 1175 gm    At the [facility] well as our own extensive experience. We reviewed major issues such as mortality rate, RDS/BPD,  steroids, sepsis, IVH, SIP, NEC, nosocomial infection, potential mortality, and length of stay (LOS) in NICU.  We specifically discussed the potential Neonatologist managing the baby in the NICU. I gave no guarantees. I offered for our service to return at their request. All questions answered, specifically no guarantees.       I outlined resuscitation, umbilical lines, surfactant, DOMINGA, mech vent, PCVC

## 2021-03-20 NOTE — CONSULTS
[facility]    Maternal Fetal Medicine Progress Note    Delicia Gannon Patient Status:  Inpatient    1990 MRN [id number]   Location [address] [place] Attending Corinne Jimenez MD   Hosp Day # 3 PCP Marrion Rubinstein, MD     SUBJECTIVE

## 2021-03-20 NOTE — PLAN OF CARE
Rn at bedside. Pt getting back in bed from using bathroom. Pt denies any headache, blurry vision, epigastric pain, difficulty breathing, or noticeable increase in swelling. Plan of care discussed, pt verbalizes understanding. Pt placed on efm monitor.

## 2021-03-20 NOTE — PROGRESS NOTES
Hd #4  Temp: 98.2 °F (36.8 °C)  Pulse: 76  Resp: 18  BP: 153/81 s- no c/o H/A double vision, RUQ pain.    Cont care

## 2021-03-20 NOTE — PROGRESS NOTES
Report to Cely Mariscal RN, care endorsed, all questions answered at this time. Pt left in stable condition.

## 2021-03-20 NOTE — PROGRESS NOTES
BP elevated. Pt sounds winded. After BP is taken, pt states \"I probably sat up for too long\" and states she was sitting on the couch visiting with her . Will retake BP after pt rests, currently now reclined in bed.  Importance of rest reinforced an

## 2021-03-21 PROCEDURE — 99231 SBSQ HOSP IP/OBS SF/LOW 25: CPT | Performed by: OBSTETRICS & GYNECOLOGY

## 2021-03-21 NOTE — PROGRESS NOTES
OB progress note  HD#5  Admission 3/17/2021  3:57 PM    A/P: 33year old [id number] at 29w2d with preeclampsia with intermittent severe BP. Transfer from [place] on 3/17/2021 at 28w5d for elevated blood pressures.  H/o delivery at 26-28 wk of IUGR fetus for s Units Subcutaneous Nightly   • Labetalol HCl  300 mg Oral 2 times per day   • docusate sodium  100 mg Oral BID   • Insulin Aspart Pen  1-5 Units Subcutaneous TID CC and HS       Exam:  Gen A&O, NAD  CV RRR, +0/9 systolic murmur  Lungs CTAB  Abd soft, obese >=60  121     eGFR       >=60  140     AST (SGOT)      15 - 37 U/L  15     ALT (SGPT)      13 - 56 U/L  20     ALKALINE PHOSPHATASE      37 - 98 U/L  83     Total Bilirubin      0.1 - 2.0 mg/dL  0.2     TOTAL PROTEIN      6.4 - 8.2 g/dL  6.

## 2021-03-21 NOTE — DIETARY NOTE
[address] [place]     Admitting diagnosis:  Pregnancy      Diet: Orders Placed This Encounter      Carbohydrate controlled diet 1800 kcal/60 grams; Is Patient on Accuchecks?  Yes    Lab Results   Component Value Date

## 2021-03-21 NOTE — CONSULTS
[facility]    Maternal Fetal Medicine Progress Note    Chloe Clark Patient Status:  Inpatient    1990 MRN [id number]   Location [address] [facility] Attending Eric Panchal MD   Hosp Day # 4 PCP Brittany Abel MD     SUBJECTIVE management  · Continue labetalol & nifedipine ER  · Continue inpatient management until delivery     Questions/concerns were discussed with patient and/or family at bedside.     Total Time spent with patient and coordinating care:  15 minutes      Rahul Hands

## 2021-03-22 ENCOUNTER — APPOINTMENT (OUTPATIENT)
Dept: ENDOCRINOLOGY | Facility: HOSPITAL | Age: 31
End: 2021-03-22
Attending: OBSTETRICS & GYNECOLOGY
Payer: COMMERCIAL

## 2021-03-22 ENCOUNTER — TELEPHONE (OUTPATIENT)
Dept: OBGYN CLINIC | Facility: CLINIC | Age: 31
End: 2021-03-22

## 2021-03-22 NOTE — PAYOR COMM NOTE
--------------  CONTINUED STAY REVIEW    Payor: JONATHON RIOJAS  Subscriber #:  [id number]  Authorization Number: [id number]    Admit date: 3/17/21  Admit time:  3:57 PM    Admitting Physician: Liseth Corona MD  Attending Physician:  Liseth Corona MD  Primary Car 8136 Given 300 mg Oral Kira Buckley RN            Plan: 3/20  Sil Mason MD   Physician   OB/Gyn   Progress Notes      Signed   Date of Service:  3/20/2021  8:07 AM               Signed             Hd #4  Temp: 98.2 °F (36.8 °C)  Pulse: 76  Resp: - will have dietitian come in to help her figure out better snack options     Anemia  -Hb 10.6  -PNV & ferrous sulfate daily ordered     H/o  x 2. Will be repeat . 3rd trim HIV negative.      B+.  T&S updated 3/21/2021   Dispo - inpatien

## 2021-03-22 NOTE — PROGRESS NOTES
Received patient from [facility] in stable condition. Plan of care discussed with patient. Pt denies headache, blurry vision or epigastric pain at this time. Pt denies feeling contractions, LOF or VB. States feeling good FM. VSS.  Will continue to monitor p

## 2021-03-22 NOTE — PROGRESS NOTES
Dr. Adrienne Raza notified of nonreactive NST. Telephone orders are to keep patient on continuous monitoring. Pt aware of POC.

## 2021-03-23 PROCEDURE — 99231 SBSQ HOSP IP/OBS SF/LOW 25: CPT | Performed by: OBSTETRICS & GYNECOLOGY

## 2021-03-23 NOTE — PROGRESS NOTES
OB progress note  HD#7  Admission 3/17/2021  3:57 PM    A/P: 33year old [id number] at 29w4d with preeclampsia with intermittent severe BP. Transfer from [place] on 3/17/2021 at 28w5d for elevated blood pressures.  H/o delivery at 26-28 wk of IUGR fetus for s • Labetalol HCl  300 mg Oral 2 times per day   • docusate sodium  100 mg Oral BID   • Insulin Aspart Pen  1-5 Units Subcutaneous TID CC and HS     FHT from last night 150 bpm, mod mansoor, no decels, AGA  [place] quiet    Exam:  Gen A&O, NAD  CV RRR, +2/6 systo

## 2021-03-23 NOTE — PROGRESS NOTES
RN to bedside, introductions made, ID band verified and initial assessment completed. Patient denies leaking of fluid or vaginal bleeding, denies contractions or abdominal tightening. +fetal movement noted.  Discussed POC with patient, questions answered, v

## 2021-03-23 NOTE — CONSULTS
[facility]    Maternal Fetal Medicine Progress Note    An Cornea Patient Status:  Inpatient    1990 MRN [id number]   Location [address] [facility] Attending Pancho Ibarra MD   Hosp Day # 6 PCP Radha Crocker MD     SUBJECTIVE with patient and/or family at bedside.     Total Time spent with patient and coordinating care:  25 minutes      Chitra Miller M.D.  3/23/2021  7:20 AM

## 2021-03-23 NOTE — CM/SW NOTE
Team rounds done on patient. Team reviewed plan of care and possible discharge needs. Team present: VIRGIE Mello; Brady Montoya, CAL Case Manager; and RN caring for patient.

## 2021-03-23 NOTE — CM/SW NOTE
03/23/21 1300   CM/SW Referral Data   Referral Source Social Work (self-referral)   Reason for Referral Discharge planning;Psychoscial assessment   Informant Patient     SW met with pt to offer support due to her antepartum admission.  Pt is currently 32

## 2021-03-24 ENCOUNTER — ULTRASOUND ENCOUNTER (OUTPATIENT)
Dept: PERINATAL CARE | Facility: HOSPITAL | Age: 31
End: 2021-03-24
Attending: OBSTETRICS & GYNECOLOGY
Payer: COMMERCIAL

## 2021-03-24 DIAGNOSIS — O09.299 HX OF PREECLAMPSIA, PRIOR PREGNANCY, CURRENTLY PREGNANT: ICD-10-CM

## 2021-03-24 DIAGNOSIS — O98.512 COVID-19 AFFECTING PREGNANCY IN SECOND TRIMESTER: ICD-10-CM

## 2021-03-24 DIAGNOSIS — U07.1 COVID-19 AFFECTING PREGNANCY IN SECOND TRIMESTER: ICD-10-CM

## 2021-03-24 PROCEDURE — 99231 SBSQ HOSP IP/OBS SF/LOW 25: CPT | Performed by: OBSTETRICS & GYNECOLOGY

## 2021-03-24 PROCEDURE — 76819 FETAL BIOPHYS PROFIL W/O NST: CPT | Performed by: OBSTETRICS & GYNECOLOGY

## 2021-03-24 NOTE — PROGRESS NOTES
Antepartum Progress Note    S: fetus active.  No HA     O:  Temp:  [97.8 °F (36.6 °C)-98.9 °F (37.2 °C)] 97.8 °F (36.6 °C)  Pulse:  [84-90] 90  Resp:  [16-18] 16  BP: (137-150)/(66-98) 146/98  FHT:  baseline  140's  [place]:  contractions  none  SVE: deferred

## 2021-03-24 NOTE — PROGRESS NOTES
Report received from [facility]. Patient resting comfortably in bed. POC discussed with patient. All questions answered. Patient verbalized understanding. Call light within reach.

## 2021-03-24 NOTE — CONSULTS
[facility]    Maternal Fetal Medicine Imaging Note    Zula Corner Patient Status:  Inpatient    1990 MRN [id number]   Location [address] [facility] Attending Yvette Vazquez MD   Hosp Day # 7 PCP Hay Victoria MD     SUBJECTIVE:

## 2021-03-24 NOTE — PROGRESS NOTES
Bedside report from CHEL Smith RN @ this time. POC discussed and will enforce. Pt stable in bed with IV saline locked and call light in reach. Pt verbalized understanding of POC.

## 2021-03-25 PROCEDURE — 99231 SBSQ HOSP IP/OBS SF/LOW 25: CPT | Performed by: OBSTETRICS & GYNECOLOGY

## 2021-03-25 NOTE — CONSULTS
[facility]    Maternal Fetal Medicine Progress Note    Devikaalisson Austin Patient Status:  Inpatient    1990 MRN [id number]   Location [address] [facility] Attending Ida Dasilva MD   Hosp Day # 8 PCP Portia Swanson MD     SUBJECTIVE

## 2021-03-25 NOTE — PROGRESS NOTES
Bedside report to SHIRA Pinto RN at this time. POC discussed and enforced. Pt stable in bed with IV saline locked and call light in reach. Pt verbalized understanding of POC.

## 2021-03-25 NOTE — PROGRESS NOTES
OB progress note  HD#9  Admission 3/17/2021  3:57 PM    A/P: 33year old [id number] at 29w6d with preeclampsia with intermittent severe BP. Transfer from [place] on 3/17/2021 at 28w5d for elevated blood pressures.  H/o delivery at 26-28 wk of IUGR fetus for s Subcutaneous Daily with dinner   • NIFEdipine ER osmotic release  30 mg Oral BID   • ferrous sulfate  325 mg Oral Daily with breakfast   • prenatal multivitamin plus DHA  1 capsule Oral Q24H   • insulin detemir  36 Units Subcutaneous Nightly   • Labetalol

## 2021-03-26 NOTE — CONSULTS
[facility]    Maternal Fetal Medicine Progress Note    Gianna Coronado Patient Status:  Inpatient    1990 MRN [id number]   Location [address] [facility] Attending Talia Fraser MD   Hosp Day # 9 PCP Jadon Gonzáles MD     SUBJECTIVE coordinating care:  15 minutes      Lucero Gonzáles M.D.  3/26/2021  1:31 PM

## 2021-03-27 PROCEDURE — 99231 SBSQ HOSP IP/OBS SF/LOW 25: CPT | Performed by: OBSTETRICS & GYNECOLOGY

## 2021-03-27 NOTE — PROGRESS NOTES
Bedside report from Mariela Vergara RN @ this time. POC discussed and will enforce. Pt stable in bed with call light in reach. Pt verbalized understanding of POC.

## 2021-03-27 NOTE — PROGRESS NOTES
Antepartum Progress Note    S:  No HA,     O:  Temp:  [98.1 °F (36.7 °C)-99.3 °F (37.4 °C)] 98.7 °F (37.1 °C)  Pulse:  [85-90] 85  Resp:  [16-18] 16  BP: (121-136)/(57-73) 130/58  FHT:   140   baseline s,   Adequate  variability,   [place]:  none      Recent

## 2021-03-28 PROCEDURE — 99231 SBSQ HOSP IP/OBS SF/LOW 25: CPT | Performed by: OBSTETRICS & GYNECOLOGY

## 2021-03-28 NOTE — PROGRESS NOTES
Bedside report to VIRGIE Gibson RN at this time. POC discussed and enforced. Pt stable in bed with call light in reach. Pt verbalized understanding of POC.

## 2021-03-28 NOTE — PROGRESS NOTES
Antepartum Progress Note    S: fetus moving.  No c/o    O:  Temp:  [97.9 °F (36.6 °C)-98.7 °F (37.1 °C)] 98 °F (36.7 °C)  Pulse:  [77-86] 81  Resp:  [16-18] 18  BP: (129-145)/(58-70) 145/70  FHT:  baseline  150s,  adequate variability, NST appropriate for g

## 2021-03-28 NOTE — PROGRESS NOTES
Bedside report from Charyl Aase RN @ this time. POC discussed and will enforce. Pt stable in bed with call light in reach. Pt verbalized understanding of POC.

## 2021-03-29 NOTE — DIETARY NOTE
[address] [place]     Admitting diagnosis:  Pregnancy      Diet: Orders Placed This Encounter      Carbohydrate controlled diet 2000 kcal/75 grams; Is Patient on Accuchecks?  Yes    Lab Results   Component Value Date

## 2021-03-29 NOTE — PROGRESS NOTES
Bedside report to MILI Elder RN at this time. POC discussed and enforced. Pt stable in bed with call light in reach. Pt verbalized understanding of POC.

## 2021-03-30 ENCOUNTER — ULTRASOUND ENCOUNTER (OUTPATIENT)
Dept: PERINATAL CARE | Facility: HOSPITAL | Age: 31
End: 2021-03-30
Attending: OBSTETRICS & GYNECOLOGY
Payer: COMMERCIAL

## 2021-03-30 PROCEDURE — 76819 FETAL BIOPHYS PROFIL W/O NST: CPT | Performed by: OBSTETRICS & GYNECOLOGY

## 2021-03-30 PROCEDURE — 99231 SBSQ HOSP IP/OBS SF/LOW 25: CPT | Performed by: OBSTETRICS & GYNECOLOGY

## 2021-03-30 RX ORDER — LABETALOL HYDROCHLORIDE 5 MG/ML
40 INJECTION, SOLUTION INTRAVENOUS ONCE AS NEEDED
Status: COMPLETED | OUTPATIENT
Start: 2021-03-30 | End: 2021-03-30

## 2021-03-30 RX ORDER — LABETALOL HYDROCHLORIDE 5 MG/ML
80 INJECTION, SOLUTION INTRAVENOUS ONCE AS NEEDED
Status: COMPLETED | OUTPATIENT
Start: 2021-03-30 | End: 2021-03-30

## 2021-03-30 RX ORDER — LABETALOL HYDROCHLORIDE 5 MG/ML
20 INJECTION, SOLUTION INTRAVENOUS ONCE AS NEEDED
Status: COMPLETED | OUTPATIENT
Start: 2021-03-30 | End: 2021-03-30

## 2021-03-30 RX ORDER — CALCIUM GLUCONATE 94 MG/ML
1 INJECTION, SOLUTION INTRAVENOUS ONCE AS NEEDED
Status: DISCONTINUED | OUTPATIENT
Start: 2021-03-30 | End: 2021-04-09

## 2021-03-30 RX ORDER — HYDRALAZINE HYDROCHLORIDE 20 MG/ML
10 INJECTION INTRAMUSCULAR; INTRAVENOUS ONCE AS NEEDED
Status: DISCONTINUED | OUTPATIENT
Start: 2021-03-30 | End: 2021-04-10 | Stop reason: HOSPADM

## 2021-03-30 NOTE — CM/SW NOTE
Team rounds done on patient. Team reviewed pt plan of care and possible needs. Team present: SIMONE Holman: Julio Art RN Case manager; RN caring for patient.

## 2021-03-30 NOTE — PAYOR COMM NOTE
--------------  CONTINUED STAY REVIEW    Payor: JONATHON RIOJAS  Subscriber #:  [id number]  Authorization Number: [id number]    Admit date: 3/17/21  Admit time:  3:57     CLINICAL UPDATE FOR 3/29/21- 3/30/21 FAXED    3/29/21  03/29/21 3491 97.3 °F (36.3 °C) — — Upper Arm)     3/29/2021 1719 Given 10 Units Subcutaneous (Left Upper Arm)       insulin detemir (LEVEMIR) 100 UNIT/ML flextouch 36 Units     Date Action Dose Route     3/30/2021 0833 Given 36 Units Subcutaneous (Left Upper Arm)       insulin detemir (LEVE

## 2021-03-30 NOTE — CONSULTS
Imaging note: BPP requested for non-reactive NST    Ultrasound Findings:  Single IUP in cephalic presentation. Placenta is left lateral.   Cardiac activity is present at 145 bpm  MVP is 2.6 cm . OLVIN 6.5 cm  BPP is 8/8.      This was an imaging visit only

## 2021-03-30 NOTE — PROGRESS NOTES
HD # 12  Temp: 98.2 °F (36.8 °C)  Pulse: 91  Resp: 18  BP: 145/86 s  +FM  No H/A  vuisual changes RUQ pain    nst pending  DTRs +1 cont care

## 2021-03-30 NOTE — PROGRESS NOTES
Report received from [facility]. Pt denies any complaints, +FM, denies lof,ctx, or bleeding. POC discussed with pt, pt denies questions. Call light within reach.

## 2021-03-30 NOTE — CM/SW NOTE
met with patient, Zaid Little, after rounds. During rounds patient's RN stated that patient would like to be transferred back to [facility] when she gets to 34 weeks.   stated that insurance plans do not cover transport to lower level of

## 2021-03-31 ENCOUNTER — TELEPHONE (OUTPATIENT)
Dept: OBGYN CLINIC | Facility: CLINIC | Age: 31
End: 2021-03-31

## 2021-03-31 PROCEDURE — 99232 SBSQ HOSP IP/OBS MODERATE 35: CPT | Performed by: OBSTETRICS & GYNECOLOGY

## 2021-03-31 NOTE — PAYOR COMM NOTE
--------------  CONTINUED STAY REVIEW    Payor: JONATHON RIOJAS  Subscriber #:  [id number]  Authorization Number: [id number]    Admit date: 3/17/21  Admit time:  3:57 PM    FAXING CLINICAL UPDATE FOR 3/31/21    3/31/21  Antepartum Progress Note     S:  No HA or 40 Units     Date Action Dose Route     3/30/2021 2100 Given 40 Units Subcutaneous (Left Upper Arm)       Labetalol HCl (TRANDATE) injection 20 mg     Date Action Dose Route     3/30/2021 2228 Given 20 mg Intravenous       Labetalol HCl (TRANDATE) injectio

## 2021-03-31 NOTE — CONSULTS
[facility]    Maternal Fetal Medicine Progress Note    Chloe Clark Patient Status:  Inpatient    1990 MRN [id number]   Location [address] [facility] Attending Eric Panchal MD   Hosp Day # 15 PCP Brittany Abel MD     SUBJECTIV 03/30/2021    CO2 22.0 03/30/2021     03/30/2021    CA 9.9 03/30/2021    ALB 2.7 03/30/2021    ALKPHO 139 03/30/2021    BILT 0.2 03/30/2021    TP 7.5 03/30/2021    AST 14 03/30/2021    ALT 16 03/30/2021   Glucoses  92  88/182/155/122  94/116/115/147 30 mg bid  · If another severe range blood pressure is encountered, treat with IV antihypertensive protocol. Then increase labetalol to 300 mg every 8 hours.   · Plan delivery between 32 and 34 weeks unless an indication for earlier delivery arises  · Cont

## 2021-03-31 NOTE — PROGRESS NOTES
Antepartum Progress Note    S:  No HA or blurred vision.  BP elevated last BP but resolved with IV labetolol    O:  Temp:  [97.9 °F (36.6 °C)-98.6 °F (37 °C)] 98.6 °F (37 °C)  Pulse:  [74-90] 80  Resp:  [18] 18  BP: (116-192)/(57-91) 129/70  FHT:  baseline

## 2021-03-31 NOTE — PROGRESS NOTES
Patient refused magnesium. Pt educated on risks and need for magnesium, pt verbalizes understanding and states that she does not want medication. Dr. Teryr Flores called and notified of pt refusal and current blood pressures.  Per MD, hold magnesium per pt refusa

## 2021-04-01 ENCOUNTER — ULTRASOUND ENCOUNTER (OUTPATIENT)
Dept: PERINATAL CARE | Facility: HOSPITAL | Age: 31
End: 2021-04-01
Attending: OBSTETRICS & GYNECOLOGY
Payer: COMMERCIAL

## 2021-04-01 DIAGNOSIS — O14.93 PREECLAMPSIA, THIRD TRIMESTER: ICD-10-CM

## 2021-04-01 DIAGNOSIS — O98.512 COVID-19 AFFECTING PREGNANCY IN SECOND TRIMESTER: ICD-10-CM

## 2021-04-01 DIAGNOSIS — O24.414 INSULIN CONTROLLED GESTATIONAL DIABETES MELLITUS (GDM) IN THIRD TRIMESTER: ICD-10-CM

## 2021-04-01 DIAGNOSIS — E66.01 MATERNAL MORBID OBESITY IN THIRD TRIMESTER, ANTEPARTUM (HCC): ICD-10-CM

## 2021-04-01 DIAGNOSIS — U07.1 COVID-19 AFFECTING PREGNANCY IN SECOND TRIMESTER: ICD-10-CM

## 2021-04-01 DIAGNOSIS — O09.299 HX OF PREECLAMPSIA, PRIOR PREGNANCY, CURRENTLY PREGNANT: ICD-10-CM

## 2021-04-01 DIAGNOSIS — E66.01 MORBID OBESITY DUE TO EXCESS CALORIES (HCC): ICD-10-CM

## 2021-04-01 DIAGNOSIS — O99.213 MATERNAL MORBID OBESITY IN THIRD TRIMESTER, ANTEPARTUM (HCC): ICD-10-CM

## 2021-04-01 DIAGNOSIS — E66.9 OBESITY (BMI 30-39.9): ICD-10-CM

## 2021-04-01 PROCEDURE — 76819 FETAL BIOPHYS PROFIL W/O NST: CPT | Performed by: OBSTETRICS & GYNECOLOGY

## 2021-04-01 PROCEDURE — 99232 SBSQ HOSP IP/OBS MODERATE 35: CPT | Performed by: OBSTETRICS & GYNECOLOGY

## 2021-04-01 NOTE — PROGRESS NOTES
HD $ 14 Temp: 99 °F (37.2 °C)  Pulse: 88  Resp: 16  BP: 143/82  S no H/A  Visual changes or RUQ pain. + FM   NST pending.   Discussed with patient and MFM need for delivery if BP increase and require IV med again  Fundus nontender,  DTRs +1 no clonus

## 2021-04-01 NOTE — IMAGING NOTE
BPP 8/8  Ultrasound Findings:  Single IUP in cephalic presentation. Placenta is left lateral.   Cardiac activity is present at 141 bpm  MVP is 3.7 cm . OLVIN 9.9 cm  BPP is 8/8.    Umbilical Artery Doppler is 2.98

## 2021-04-02 PROCEDURE — 99231 SBSQ HOSP IP/OBS SF/LOW 25: CPT | Performed by: OBSTETRICS & GYNECOLOGY

## 2021-04-02 NOTE — PROGRESS NOTES
Antepartum Progress Note     S: Headaches or blurred vision.   Blood pressure well controlled  Temp:  [98.4 °F (36.9 °C)-99.5 °F (37.5 °C)] 98.6 °F (37 °C)  Pulse:  [82-91] 83  Resp:  [18-20] 18  BP: (124-145)/(63-81) 145/81  FHT:  baseline  150s, adequate

## 2021-04-03 PROCEDURE — 99231 SBSQ HOSP IP/OBS SF/LOW 25: CPT | Performed by: OBSTETRICS & GYNECOLOGY

## 2021-04-03 NOTE — PROGRESS NOTES
OB progress note  HD#18  Admission 3/17/2021  3:57 PM    A/P: 33year old [id number] at 31w1d with preeclampsia with intermittent severe BP. Transfer from [facility] on 3/17/2021 at 28w5d for elevated blood pressures.  H/o delivery at 26-28 wk of IUGR fetus for Oral Daily with breakfast   • prenatal multivitamin plus DHA  1 capsule Oral Q24H   • Labetalol HCl  300 mg Oral 2 times per day   • docusate sodium  100 mg Oral BID   • Insulin Aspart Pen  1-5 Units Subcutaneous TID CC and HS     Last evening's tracing  F ANION GAP      0 - 18 mmol/L  8   BUN      7 - 18 mg/dL  9   CREATININE      0.55 - 1.02 mg/dL  0.61   BUN/CREAT Ratio      10.0 - 20.0  14.8   CALCIUM      8.5 - 10.1 mg/dL  9.4   CALCULATED OSMOLALITY      275 - 295 mOsm/kg  284   eGFR NON-AFR.  LILLIAN

## 2021-04-04 PROCEDURE — 99231 SBSQ HOSP IP/OBS SF/LOW 25: CPT | Performed by: OBSTETRICS & GYNECOLOGY

## 2021-04-04 NOTE — PROGRESS NOTES
OB progress note  HD#19  Admission 3/17/2021  3:57 PM    A/P: 33year old [id number] at 31w2d with preeclampsia with intermittent severe BP. Transfer from [place] [id number] on 3/17/2021 at 28w5d for elevated blood pressures.  H/o delivery at 26-28 wk of IUGR fetus for Labetalol HCl  300 mg Oral Q8H [facility]   • Insulin Aspart Pen  12 Units Subcutaneous TID CC   • magnesium sulfate bolus from bag *For OB Use*  4 g Intravenous Once   • insulin detemir  36 Units Subcutaneous QAM   • insulin detemir  40 Units Subcutaneous Nightl %  2.6   Basophils %      %  0.3   Immature Granulocyte %      %  0.3   Glucose      70 - 99 mg/dL  134 (H)   Sodium      136 - 145 mmol/L  137   Potassium      3.5 - 5.1 mmol/L  3.7   Chloride      98 - 112 mmol/L  108   Carbon Dioxide, Total      21.0 -

## 2021-04-05 ENCOUNTER — TELEPHONE (OUTPATIENT)
Dept: NEUROLOGY | Facility: CLINIC | Age: 31
End: 2021-04-05

## 2021-04-05 PROCEDURE — 99231 SBSQ HOSP IP/OBS SF/LOW 25: CPT | Performed by: OBSTETRICS & GYNECOLOGY

## 2021-04-05 RX ORDER — PSEUDOEPHEDRINE HCL 120 MG/1
120 TABLET, FILM COATED, EXTENDED RELEASE ORAL EVERY 12 HOURS SCHEDULED
Status: DISCONTINUED | OUTPATIENT
Start: 2021-04-05 | End: 2021-04-14

## 2021-04-05 RX ORDER — CETIRIZINE HYDROCHLORIDE 10 MG/1
10 TABLET ORAL DAILY
Status: DISCONTINUED | OUTPATIENT
Start: 2021-04-05 | End: 2021-04-14

## 2021-04-05 RX ORDER — ECHINACEA PURPUREA EXTRACT 125 MG
1 TABLET ORAL
Status: DISCONTINUED | OUTPATIENT
Start: 2021-04-05 | End: 2021-04-14

## 2021-04-05 NOTE — PAYOR COMM NOTE
--------------  CONTINUED STAY REVIEW    Payor: JONATHON RIOJAS  Subscriber #:  [id number]  Authorization Number: [id number]    Admit date: 3/17/21  Admit time:  3:57 PM    FAXING CLINICAL UPDATE FOR 4/5/21 4/5/21  Antepartum Progress Note     S: no HA, fetu Action Dose Route     4/5/2021 0925 Given 36 Units Subcutaneous (Left Upper Arm)       insulin detemir (LEVEMIR) 100 UNIT/ML flextouch 40 Units     Date Action Dose Route     4/4/2021 2112 Given 40 Units Subcutaneous (Right Upper Arm)       NIFEdipine ER o

## 2021-04-05 NOTE — PROGRESS NOTES
Patient resting comfortably in bed. Call light within reach. Plan of care discussed and questions answered. Patient reports + fetal movement. Denies patrica, cramping, bleeding or leaking fluid.   Signs/symptoms of pre-eclampsia reviewed & patient d

## 2021-04-05 NOTE — PROGRESS NOTES
Antepartum Progress Note    S: no HA, fetus active    O:  Temp:  [98.1 °F (36.7 °C)-98.3 °F (36.8 °C)] 98.3 °F (36.8 °C)  Pulse:  [81-89] 86  Resp:  [18-20] 18  BP: (115-173)/(55-92) 127/65  FHT:  baseline  140s,  good variability, accels appreciated,   To

## 2021-04-06 NOTE — CONSULTS
[facility]    Maternal Fetal Medicine Progress Note    Arleigh ann Garcias Patient Status:  Inpatient    1990 MRN [id number]   Location [address] [facility] Attending Lu Ferguson MD   Hosp Day # 20 PCP Romina Araujo MD     SUBJECTIV Accu-Cheks  · Continue with plan for delivery by 34 weeks unless an earlier indication arises     Questions/concerns were discussed with patient and/or family at bedside.     Total Time spent with patient and coordinating care:  25 minutes      Addie Bermudez,

## 2021-04-06 NOTE — TELEPHONE ENCOUNTER
PT XIANG to [place]    Updated [facility]    Updated RPW     Updated book and calender

## 2021-04-06 NOTE — CM/SW NOTE
Team rounds done on patient. Team reviewed pt plan of care and possible needs. Team present: VIRGIE Moore, SW: Bruce Odom RN Case manager; RN caring for patient.

## 2021-04-07 ENCOUNTER — TELEPHONE (OUTPATIENT)
Dept: OBGYN CLINIC | Facility: CLINIC | Age: 31
End: 2021-04-07

## 2021-04-07 ENCOUNTER — ULTRASOUND ENCOUNTER (OUTPATIENT)
Dept: PERINATAL CARE | Facility: HOSPITAL | Age: 31
End: 2021-04-07
Attending: OBSTETRICS & GYNECOLOGY
Payer: COMMERCIAL

## 2021-04-07 DIAGNOSIS — U07.1 COVID-19 AFFECTING PREGNANCY IN SECOND TRIMESTER: ICD-10-CM

## 2021-04-07 DIAGNOSIS — O98.512 COVID-19 AFFECTING PREGNANCY IN SECOND TRIMESTER: ICD-10-CM

## 2021-04-07 DIAGNOSIS — O09.299 HX OF PREECLAMPSIA, PRIOR PREGNANCY, CURRENTLY PREGNANT: ICD-10-CM

## 2021-04-07 PROBLEM — O41.03X0 OLIGOHYDRAMNIOS IN THIRD TRIMESTER: Status: ACTIVE | Noted: 2021-04-07

## 2021-04-07 PROCEDURE — 99231 SBSQ HOSP IP/OBS SF/LOW 25: CPT | Performed by: OBSTETRICS & GYNECOLOGY

## 2021-04-07 PROCEDURE — 76816 OB US FOLLOW-UP PER FETUS: CPT | Performed by: OBSTETRICS & GYNECOLOGY

## 2021-04-07 PROCEDURE — 76820 UMBILICAL ARTERY ECHO: CPT

## 2021-04-07 PROCEDURE — 76819 FETAL BIOPHYS PROFIL W/O NST: CPT

## 2021-04-07 PROCEDURE — 76821 MIDDLE CEREBRAL ARTERY ECHO: CPT

## 2021-04-07 RX ORDER — NIFEDIPINE 30 MG/1
30 TABLET, EXTENDED RELEASE ORAL DAILY
Status: DISCONTINUED | OUTPATIENT
Start: 2021-04-07 | End: 2021-04-08

## 2021-04-07 RX ORDER — BETAMETHASONE SODIUM PHOSPHATE AND BETAMETHASONE ACETATE 3; 3 MG/ML; MG/ML
12 INJECTION, SUSPENSION INTRA-ARTICULAR; INTRALESIONAL; INTRAMUSCULAR; SOFT TISSUE EVERY 24 HOURS
Status: COMPLETED | OUTPATIENT
Start: 2021-04-07 | End: 2021-04-08

## 2021-04-07 NOTE — IMAGING NOTE
Fetal growth, BPP & Doppler US    Ultrasound Findings:  Single IUP in cephalic presentation. Placenta is left lateral.   A 3 vessel cord is noted. Cardiac activity is present at 122 bpm  EFW 1510 g ( 3 lb 5 oz); 24%. OLVIN is  4.4 cm. MVP is 2.2 cm.

## 2021-04-07 NOTE — CONSULTS
[facility]    Maternal Fetal Medicine Progress Note    [facility] Patient Status:  Inpatient    1990 MRN [id number]   Location [address] [facility] Attending Josue Almanza MD   Hosp Day # 21 PCP Ariane Sanchez MD     SUBJECTIV -elevated post dinner blood sugar for other blood sugar improving  · Oligohydramnios & placental dysfunction  · Reassuring fetal testing    RECOMENDATIONS:   · Continue inpatient management with plan delivery at 32-34 weeks  · Weekly BPP and Doppler  · Con

## 2021-04-07 NOTE — PROGRESS NOTES
HD#20    Patient has no complaints, good fetal movement, no headache or blurred vision    /76   Pulse 80   Temp 98.3 °F (36.8 °C)   Resp 20   Ht 64\"   Wt 277 lb (125.6 kg)   LMP 08/03/2020 (Exact Date)   SpO2 98%   BMI 47.55 kg/m²     Recent Labs

## 2021-04-07 NOTE — PROGRESS NOTES
OB progress note  HD#21  Admission 3/17/2021  3:57 PM    A/P: 33year old [id number] at 31w5d with preeclampsia with intermittent severe BP. Transfer from [place] on 3/17/2021 at 28w5d for elevated blood pressures.  H/o delivery at 26-28 wk of IUGR fetus for °C)  Pulse:  [80-87] 85  Resp:  [18-20] 18  BP: (124-146)/(58-83) 134/71    • insulin detemir  44 Units Subcutaneous Nightly   • Insulin Aspart Pen  16 Units Subcutaneous TID CC   • insulin detemir  40 Units Subcutaneous QAM   • cetirizine  10 mg Oral Jalen 59.4   Lymphocytes %      % 26.8   Monocytes %      % 10.6   Eosinophils %      % 2.6   Basophils %      % 0.3   Immature Granulocyte %      % 0.3   Glucose      70 - 99 mg/dL 95   Sodium      136 - 145 mmol/L 138   Potassium      3.5 - 5.1 mmol/L 3.8   Ch

## 2021-04-07 NOTE — TELEPHONE ENCOUNTER
[facility] paper work has been completed & signed. faxed to Xiao Canchola @[phone].

## 2021-04-08 PROCEDURE — 99232 SBSQ HOSP IP/OBS MODERATE 35: CPT | Performed by: OBSTETRICS & GYNECOLOGY

## 2021-04-08 RX ORDER — NIFEDIPINE 30 MG/1
30 TABLET, EXTENDED RELEASE ORAL NIGHTLY
Status: DISCONTINUED | OUTPATIENT
Start: 2021-04-08 | End: 2021-04-10

## 2021-04-08 NOTE — PROGRESS NOTES
0620-patient refused the novolog 6units at this time.  Patient states she will take her morning insulin that's scheduled

## 2021-04-08 NOTE — PROGRESS NOTES
OB attending    Notified by RN of patient's US findings today including new diagnosis of oligohydramnios and elevated UAD. BPP 8/8. EFW 24%ile at 31w5d.      Patient initially denied leaking any fluid but told RN she might be leaking when she gets up to [employer]

## 2021-04-08 NOTE — PROGRESS NOTES
0615-dr zayas notified of fasting blood sugar 176, order for a 1 time dose of novolog 6 units given by dr Susu Ayala

## 2021-04-08 NOTE — PROGRESS NOTES
Antepartum Progress Note    S: feeling well. No HA. Wants to continue pregnancy as long as possible. Wants to make at least to 34 weeks. Wants hold off on delivery until  absolutely necessary.       O:  Temp:  [98.1 °F (36.7 °C)-99 °F (37.2 °C)] 98.7 °F (37

## 2021-04-09 ENCOUNTER — ANESTHESIA (OUTPATIENT)
Dept: OBGYN UNIT | Facility: HOSPITAL | Age: 31
End: 2021-04-09
Payer: COMMERCIAL

## 2021-04-09 ENCOUNTER — ULTRASOUND ENCOUNTER (OUTPATIENT)
Dept: PERINATAL CARE | Facility: HOSPITAL | Age: 31
End: 2021-04-09
Attending: OBSTETRICS & GYNECOLOGY
Payer: COMMERCIAL

## 2021-04-09 ENCOUNTER — ANESTHESIA EVENT (OUTPATIENT)
Dept: OBGYN UNIT | Facility: HOSPITAL | Age: 31
End: 2021-04-09
Payer: COMMERCIAL

## 2021-04-09 DIAGNOSIS — O14.93 PREECLAMPSIA, THIRD TRIMESTER: ICD-10-CM

## 2021-04-09 DIAGNOSIS — U07.1 COVID-19 AFFECTING PREGNANCY IN SECOND TRIMESTER: ICD-10-CM

## 2021-04-09 DIAGNOSIS — E66.9 OBESITY (BMI 30-39.9): ICD-10-CM

## 2021-04-09 DIAGNOSIS — O41.03X0 OLIGOHYDRAMNIOS IN THIRD TRIMESTER, SINGLE OR UNSPECIFIED FETUS: ICD-10-CM

## 2021-04-09 DIAGNOSIS — O98.512 COVID-19 AFFECTING PREGNANCY IN SECOND TRIMESTER: ICD-10-CM

## 2021-04-09 DIAGNOSIS — E66.01 MORBID OBESITY DUE TO EXCESS CALORIES (HCC): ICD-10-CM

## 2021-04-09 DIAGNOSIS — O24.414 INSULIN CONTROLLED GESTATIONAL DIABETES MELLITUS (GDM) IN THIRD TRIMESTER: ICD-10-CM

## 2021-04-09 PROCEDURE — 76816 OB US FOLLOW-UP PER FETUS: CPT | Performed by: OBSTETRICS & GYNECOLOGY

## 2021-04-09 PROCEDURE — 76819 FETAL BIOPHYS PROFIL W/O NST: CPT | Performed by: OBSTETRICS & GYNECOLOGY

## 2021-04-09 PROCEDURE — 59515 CESAREAN DELIVERY: CPT | Performed by: OBSTETRICS & GYNECOLOGY

## 2021-04-09 PROCEDURE — 76820 UMBILICAL ARTERY ECHO: CPT

## 2021-04-09 PROCEDURE — 99231 SBSQ HOSP IP/OBS SF/LOW 25: CPT | Performed by: OBSTETRICS & GYNECOLOGY

## 2021-04-09 PROCEDURE — 76819 FETAL BIOPHYS PROFIL W/O NST: CPT

## 2021-04-09 RX ORDER — ACETAMINOPHEN 500 MG
1000 TABLET ORAL EVERY 6 HOURS
Status: DISCONTINUED | OUTPATIENT
Start: 2021-04-09 | End: 2021-04-14

## 2021-04-09 RX ORDER — SODIUM CHLORIDE 9 MG/ML
INJECTION, SOLUTION INTRAVENOUS CONTINUOUS
Status: DISCONTINUED | OUTPATIENT
Start: 2021-04-09 | End: 2021-04-10

## 2021-04-09 RX ORDER — KETOROLAC TROMETHAMINE 30 MG/ML
30 INJECTION, SOLUTION INTRAMUSCULAR; INTRAVENOUS ONCE AS NEEDED
Status: COMPLETED | OUTPATIENT
Start: 2021-04-09 | End: 2021-04-09

## 2021-04-09 RX ORDER — KETOROLAC TROMETHAMINE 30 MG/ML
30 INJECTION, SOLUTION INTRAMUSCULAR; INTRAVENOUS EVERY 6 HOURS
Status: ACTIVE | OUTPATIENT
Start: 2021-04-09 | End: 2021-04-10

## 2021-04-09 RX ORDER — KETOROLAC TROMETHAMINE 30 MG/ML
INJECTION, SOLUTION INTRAMUSCULAR; INTRAVENOUS
Status: COMPLETED
Start: 2021-04-09 | End: 2021-04-09

## 2021-04-09 RX ORDER — HYDROMORPHONE HYDROCHLORIDE 1 MG/ML
0.4 INJECTION, SOLUTION INTRAMUSCULAR; INTRAVENOUS; SUBCUTANEOUS EVERY 2 HOUR PRN
Status: ACTIVE | OUTPATIENT
Start: 2021-04-09 | End: 2021-04-10

## 2021-04-09 RX ORDER — BISACODYL 10 MG
10 SUPPOSITORY, RECTAL RECTAL
Status: DISCONTINUED | OUTPATIENT
Start: 2021-04-09 | End: 2021-04-14

## 2021-04-09 RX ORDER — ZOLPIDEM TARTRATE 5 MG/1
5 TABLET ORAL NIGHTLY PRN
Status: DISCONTINUED | OUTPATIENT
Start: 2021-04-09 | End: 2021-04-14

## 2021-04-09 RX ORDER — ONDANSETRON 2 MG/ML
4 INJECTION INTRAMUSCULAR; INTRAVENOUS EVERY 6 HOURS PRN
Status: DISCONTINUED | OUTPATIENT
Start: 2021-04-09 | End: 2021-04-14

## 2021-04-09 RX ORDER — CALCIUM GLUCONATE 94 MG/ML
1 INJECTION, SOLUTION INTRAVENOUS ONCE AS NEEDED
Status: DISCONTINUED | OUTPATIENT
Start: 2021-04-09 | End: 2021-04-14

## 2021-04-09 RX ORDER — HYDROMORPHONE HYDROCHLORIDE 1 MG/ML
0.3 INJECTION, SOLUTION INTRAMUSCULAR; INTRAVENOUS; SUBCUTANEOUS EVERY 2 HOUR PRN
Status: DISCONTINUED | OUTPATIENT
Start: 2021-04-09 | End: 2021-04-14

## 2021-04-09 RX ORDER — GABAPENTIN 300 MG/1
300 CAPSULE ORAL EVERY 8 HOURS PRN
Status: DISCONTINUED | OUTPATIENT
Start: 2021-04-09 | End: 2021-04-14

## 2021-04-09 RX ORDER — DEXAMETHASONE SODIUM PHOSPHATE 4 MG/ML
VIAL (ML) INJECTION AS NEEDED
Status: DISCONTINUED | OUTPATIENT
Start: 2021-04-09 | End: 2021-04-09 | Stop reason: SURG

## 2021-04-09 RX ORDER — NALBUPHINE HCL 10 MG/ML
2.5 AMPUL (ML) INJECTION EVERY 4 HOURS PRN
Status: DISCONTINUED | OUTPATIENT
Start: 2021-04-09 | End: 2021-04-14

## 2021-04-09 RX ORDER — DIPHENHYDRAMINE HCL 25 MG
25 CAPSULE ORAL EVERY 4 HOURS PRN
Status: DISCONTINUED | OUTPATIENT
Start: 2021-04-09 | End: 2021-04-14

## 2021-04-09 RX ORDER — DIPHENHYDRAMINE HYDROCHLORIDE 50 MG/ML
12.5 INJECTION INTRAMUSCULAR; INTRAVENOUS EVERY 4 HOURS PRN
Status: DISCONTINUED | OUTPATIENT
Start: 2021-04-09 | End: 2021-04-14

## 2021-04-09 RX ORDER — ONDANSETRON 2 MG/ML
4 INJECTION INTRAMUSCULAR; INTRAVENOUS ONCE AS NEEDED
Status: ACTIVE | OUTPATIENT
Start: 2021-04-09 | End: 2021-04-09

## 2021-04-09 RX ORDER — ONDANSETRON 2 MG/ML
4 INJECTION INTRAMUSCULAR; INTRAVENOUS EVERY 6 HOURS PRN
Status: DISCONTINUED | OUTPATIENT
Start: 2021-04-09 | End: 2021-04-09

## 2021-04-09 RX ORDER — MORPHINE SULFATE 0.5 MG/ML
0.2 INJECTION, SOLUTION EPIDURAL; INTRATHECAL; INTRAVENOUS ONCE
Status: DISCONTINUED | OUTPATIENT
Start: 2021-04-09 | End: 2021-04-14

## 2021-04-09 RX ORDER — NALOXONE HYDROCHLORIDE 0.4 MG/ML
0.08 INJECTION, SOLUTION INTRAMUSCULAR; INTRAVENOUS; SUBCUTANEOUS
Status: ACTIVE | OUTPATIENT
Start: 2021-04-09 | End: 2021-04-10

## 2021-04-09 RX ORDER — IBUPROFEN 600 MG/1
600 TABLET ORAL EVERY 6 HOURS
Status: DISCONTINUED | OUTPATIENT
Start: 2021-04-10 | End: 2021-04-14

## 2021-04-09 RX ORDER — DIPHENHYDRAMINE HYDROCHLORIDE 50 MG/ML
25 INJECTION INTRAMUSCULAR; INTRAVENOUS ONCE AS NEEDED
Status: ACTIVE | OUTPATIENT
Start: 2021-04-09 | End: 2021-04-09

## 2021-04-09 RX ORDER — DEXTROSE MONOHYDRATE 25 G/50ML
50 INJECTION, SOLUTION INTRAVENOUS
Status: DISCONTINUED | OUTPATIENT
Start: 2021-04-09 | End: 2021-04-09

## 2021-04-09 RX ORDER — NALBUPHINE HCL 10 MG/ML
2.5 AMPUL (ML) INJECTION
Status: DISCONTINUED | OUTPATIENT
Start: 2021-04-09 | End: 2021-04-14

## 2021-04-09 RX ORDER — SODIUM CHLORIDE, SODIUM LACTATE, POTASSIUM CHLORIDE, CALCIUM CHLORIDE 600; 310; 30; 20 MG/100ML; MG/100ML; MG/100ML; MG/100ML
INJECTION, SOLUTION INTRAVENOUS CONTINUOUS
Status: DISCONTINUED | OUTPATIENT
Start: 2021-04-09 | End: 2021-04-14

## 2021-04-09 RX ORDER — DEXTROSE MONOHYDRATE 25 G/50ML
50 INJECTION, SOLUTION INTRAVENOUS
Status: DISCONTINUED | OUTPATIENT
Start: 2021-04-09 | End: 2021-04-10 | Stop reason: HOSPADM

## 2021-04-09 RX ORDER — SODIUM CHLORIDE, SODIUM LACTATE, POTASSIUM CHLORIDE, CALCIUM CHLORIDE 600; 310; 30; 20 MG/100ML; MG/100ML; MG/100ML; MG/100ML
INJECTION, SOLUTION INTRAVENOUS CONTINUOUS
Status: DISCONTINUED | OUTPATIENT
Start: 2021-04-09 | End: 2021-04-09

## 2021-04-09 RX ORDER — OXYCODONE HYDROCHLORIDE 5 MG/1
5 TABLET ORAL EVERY 6 HOURS PRN
Status: DISCONTINUED | OUTPATIENT
Start: 2021-04-09 | End: 2021-04-14

## 2021-04-09 RX ORDER — BUPIVACAINE HYDROCHLORIDE 7.5 MG/ML
INJECTION, SOLUTION INTRASPINAL AS NEEDED
Status: DISCONTINUED | OUTPATIENT
Start: 2021-04-09 | End: 2021-04-09 | Stop reason: SURG

## 2021-04-09 RX ORDER — ONDANSETRON 2 MG/ML
INJECTION INTRAMUSCULAR; INTRAVENOUS AS NEEDED
Status: DISCONTINUED | OUTPATIENT
Start: 2021-04-09 | End: 2021-04-09 | Stop reason: SURG

## 2021-04-09 RX ORDER — DEXTROSE, SODIUM CHLORIDE, SODIUM LACTATE, POTASSIUM CHLORIDE, AND CALCIUM CHLORIDE 5; .6; .31; .03; .02 G/100ML; G/100ML; G/100ML; G/100ML; G/100ML
INJECTION, SOLUTION INTRAVENOUS CONTINUOUS PRN
Status: DISCONTINUED | OUTPATIENT
Start: 2021-04-09 | End: 2021-04-14

## 2021-04-09 RX ORDER — HEPARIN SODIUM 5000 [USP'U]/ML
5000 INJECTION, SOLUTION INTRAVENOUS; SUBCUTANEOUS EVERY 12 HOURS SCHEDULED
Status: DISCONTINUED | OUTPATIENT
Start: 2021-04-10 | End: 2021-04-10

## 2021-04-09 RX ORDER — HYDROMORPHONE HYDROCHLORIDE 1 MG/ML
0.4 INJECTION, SOLUTION INTRAMUSCULAR; INTRAVENOUS; SUBCUTANEOUS EVERY 5 MIN PRN
Status: DISCONTINUED | OUTPATIENT
Start: 2021-04-09 | End: 2021-04-10 | Stop reason: HOSPADM

## 2021-04-09 RX ORDER — MORPHINE SULFATE 2 MG/ML
INJECTION, SOLUTION INTRAMUSCULAR; INTRAVENOUS AS NEEDED
Status: DISCONTINUED | OUTPATIENT
Start: 2021-04-09 | End: 2021-04-09 | Stop reason: SURG

## 2021-04-09 RX ORDER — TRISODIUM CITRATE DIHYDRATE AND CITRIC ACID MONOHYDRATE 500; 334 MG/5ML; MG/5ML
SOLUTION ORAL
Status: COMPLETED
Start: 2021-04-09 | End: 2021-04-09

## 2021-04-09 RX ORDER — SIMETHICONE 80 MG
80 TABLET,CHEWABLE ORAL 3 TIMES DAILY PRN
Status: DISCONTINUED | OUTPATIENT
Start: 2021-04-09 | End: 2021-04-14

## 2021-04-09 RX ADMIN — DEXAMETHASONE SODIUM PHOSPHATE 8 MG: 4 MG/ML VIAL (ML) INJECTION at 20:02:00

## 2021-04-09 RX ADMIN — SODIUM CHLORIDE, SODIUM LACTATE, POTASSIUM CHLORIDE, CALCIUM CHLORIDE: 600; 310; 30; 20 INJECTION, SOLUTION INTRAVENOUS at 19:45:00

## 2021-04-09 RX ADMIN — SODIUM CHLORIDE, SODIUM LACTATE, POTASSIUM CHLORIDE, CALCIUM CHLORIDE: 600; 310; 30; 20 INJECTION, SOLUTION INTRAVENOUS at 21:35:00

## 2021-04-09 RX ADMIN — BUPIVACAINE HYDROCHLORIDE 1.6 ML: 7.5 INJECTION, SOLUTION INTRASPINAL at 19:59:00

## 2021-04-09 RX ADMIN — ONDANSETRON 4 MG: 2 INJECTION INTRAMUSCULAR; INTRAVENOUS at 20:02:00

## 2021-04-09 RX ADMIN — MORPHINE SULFATE 0.2 MG: 2 INJECTION, SOLUTION INTRAMUSCULAR; INTRAVENOUS at 19:59:00

## 2021-04-09 NOTE — PROGRESS NOTES
OB progress note  HD#23  Admission 3/17/2021  3:57 PM    A/P: 33year old [id number] at 32w0d with preeclampsia with intermittent severe BP. Transfer from [facility] on 3/17/2021 at 28w5d for elevated blood pressures.  H/o delivery at 26-28 wk of IUGR fetus for 04/08/21  1024 04/08/21  1515 04/08/21  2100 04/09/21  0636   BP: 144/74 143/79 138/76 135/73   BP Location:   Left arm Left arm   Pulse: 105 96 90 90   Resp: 18  18 18   Temp: 98.7 °F (37.1 °C)  98.5 °F (36.9 °C) 98.7 °F (37.1 °C)   TempSrc: Oral  Or

## 2021-04-09 NOTE — CONSULTS
[facility]    Maternal Fetal Medicine Progress Note    Quan Blake Patient Status:  Inpatient    1990 MRN [id number]   Location [address] [facility] Attending Shreya Hurd MD   Hosp Day # 23 PCP Diaz Rodriguez MD     SUBJECTIV uncontrolled recently due to betamethasone course  · History of   · Class III obesity complicating pregnancy    RECOMENDATIONS:   · I recommend moving forward with delivery based on severe features of preeclampsia   · Insulin to control her blood

## 2021-04-09 NOTE — PROGRESS NOTES
OB progress note    Spoke with MFM Dr. Paul Aldana - recommends delivery today. Patient had US this morning. Still oligo.  Will be 48 hr after rescue steroids this evening around 6:20 pm, but due to shift change will plan to perform repeat  around 7:30 p

## 2021-04-09 NOTE — ANESTHESIA PREPROCEDURE EVALUATION
PRE-OP EVALUATION    Patient Name: Reba Tam    Admit Diagnosis: Pregnancy  Preeclampsia, third trimester    Pre-op Diagnosis: * No pre-op diagnosis entered *     SECTION    Anesthesia Procedure:  SECTION (N/A )    Surgeon(s) and R (TRANDATE) injection 40 mg, 40 mg, Intravenous, Once PRN   And  [COMPLETED] Labetalol HCl (TRANDATE) injection 80 mg, 80 mg, Intravenous, Once PRN   And  hydrALAzine HCl (APRESOLINE) injection 10 mg, 10 mg, Intravenous, Once PRN  Normal Saline Flush 0.9 % by mouth daily. , Disp: , Rfl: , Past Week at Unknown time  BLAKE MICROLET LANCETS Does not apply Misc, 1 lancet by Finger stick route 4 (four) times daily.  Test 4x daily, morning and 2 hours after each meal., Disp: 100 each, Rfl: 3  Glucose Blood (CONTOUR Airway      Mallampati: III  Mouth opening: >3 FB  TM distance: > 6 cm  Neck ROM: full Cardiovascular    Cardiovascular exam normal.         Dental    No notable dental history.          Pulmonary    Pulmonary exam normal.                 Other findin

## 2021-04-09 NOTE — PROGRESS NOTES
OB attending    Discussed plan with patient and her partner for delivery tonight at the 48 hour wilfredo after rescue steroids, to be done with magnesium on board. Risks, benefits discussed. Questions answered.       bpm, moderate variability  [place]

## 2021-04-10 RX ORDER — ENOXAPARIN SODIUM 100 MG/ML
40 INJECTION SUBCUTANEOUS DAILY
Status: DISCONTINUED | OUTPATIENT
Start: 2021-04-10 | End: 2021-04-14

## 2021-04-10 NOTE — ANESTHESIA POSTPROCEDURE EVALUATION
1721 YESSI Angulo Patient Status:  Inpatient   Age/Gender 33year old female MRN [id number]   Location [address] [facility] Attending Zulay Mcmullen, [address] [facility] Se Day # 21 PCP Amee Hurd MD       Anesthesia Post-op Note

## 2021-04-10 NOTE — PLAN OF CARE
Problem: POSTPARTUM  Goal: Long Term Goal:Experiences normal postpartum course  Description: INTERVENTIONS:  - Assess and monitor vital signs and lab values. - Assess fundus and lochia. - Provide ice/sitz baths for perineum discomfort.   - Monitor heali pain/trauma. - Instruct and provide assistance with proper latch. - Review techniques for milk expression (breast pumping) and storage of breast milk. Provide pumping equipment/supplies, instructions and assistance, as needed.   - Encourage rooming-in and Problem: RISK FOR INFECTION - ADULT  Goal: Absence of fever/infection during anticipated neutropenic period  Description: INTERVENTIONS  - Monitor WBC  - Administer growth factors as ordered  - Implement neutropenic guidelines  Outcome: Progressing     P

## 2021-04-10 NOTE — PROGRESS NOTES
[facility]  Post-Partum Caesarean Section Progress Note    Canterbury Conception Patient Status:  Inpatient    1990 MRN [id number]   Location [address] [facility] Attending Natalya Yusuf MD   Hosp Day # 24 PCP Andrea Rosales MD

## 2021-04-10 NOTE — L&D DELIVERY NOTE
Katherleen Hodgkin [[id number]]    Labor Events     labor?: No   steroids?: Full Course  Antibiotics received during labor?: No  Antibiotics (enter # doses in comment): other (Comment: ancef 3 gms)  Rupture date/time: 2021 2016     Rupture t withdrawal    Muscle tone Limp Some flexion Active motion    Respiratory effort Absent Weak cry; hypoventilation Good, crying              1 Minute:  5 Minute:  10 Minute:  15 Minute:  20 Minute:    Skin color: 0  1       Heart rate: 2  2       Reflex irri

## 2021-04-10 NOTE — ANESTHESIA PROCEDURE NOTES
Spinal Block  Performed by: Lora Carr MD  Authorized by: Lora Carr MD       General Information and Staff    Start Time:  4/9/2021 7:44 PM  End Time:  4/9/2021 7:48 PM  Anesthesiologist:  Lora Carr MD  Performed by:   Anesthesiologist  Keyla

## 2021-04-10 NOTE — PLAN OF CARE
Problem: POSTPARTUM  Goal: Long Term Goal:Experiences normal postpartum course  Description: INTERVENTIONS:  - Assess and monitor vital signs and lab values. - Assess fundus and lochia. - Provide ice/sitz baths for perineum discomfort.   - Monitor heali patient reports new pain  - Anticipate increased pain with activity and pre-medicate as appropriate  Outcome: Progressing     Problem: RISK FOR INFECTION - ADULT  Goal: Absence of fever/infection during anticipated neutropenic period  Description: Bre Rothman

## 2021-04-10 NOTE — PROGRESS NOTES
[facility] Patient Status:  Inpatient   Age/Gender 33year old female MRN [id number]   Location [address] [facility] Attending Hazeline Eisenmenger, MD   Hosp Day # 25 PCP Paty Campo MD

## 2021-04-10 NOTE — OPERATIVE REPORT
[facility]   Section - Operative Note    Khanhpriscila Gonzalez Patient Status:  Inpatient    1990 MRN [id number]   Location [address] [facility] Attending Jeremías Davis MD   Hosp Day # 23 PCP Eugenie Andrade MD     Date of Pr oligohydramnios still present. Delivery recommended. Fasting glucose was over 200 mg/dL so insulin drip was started and glucose of 103 mg/dL was achieved prior to delivery.  Magnesium sulfate 6 gram bolus for peripartum seizure prophylaxis due to preeclamps with 0 Vicryl. The incision was inspected for hemostasis. Uterus, tubes and ovaries were normal in appearance. The self retaining retractor was removed. Re-inspection of the hysterotomy, peritoneum ,and rectus muscles revealed hemostasis.  The pariet

## 2021-04-10 NOTE — PLAN OF CARE
Problem: Patient/Family Goals  Goal: Patient/Family Short Term Goal  Description: Patient's Short Term Goal: Maintain BP within range    Interventions:     - See additional Care Plan goals for specific interventions  Outcome: Progressing  Note: Uncomplic

## 2021-04-11 RX ORDER — LABETALOL 200 MG/1
200 TABLET, FILM COATED ORAL EVERY 12 HOURS SCHEDULED
Status: DISCONTINUED | OUTPATIENT
Start: 2021-04-11 | End: 2021-04-12

## 2021-04-11 NOTE — PROGRESS NOTES
Pt up to bathroom, ambulated well with standby assistance. Pt voided 200 ml clear yellow urine, pericare done, pad changed, scant bleeding. Pt to wheelchair. Transferred to NICU to see infant. Pt alert and stable.

## 2021-04-11 NOTE — PROGRESS NOTES
Dr Chava Knott no longer following OB pts, call to AMI Rehman, he called back and agrees w Reuben Lam starting Metformin bid tonight and he will see pt tomorrow in 2199. All explained to pt and yosef christianson good understanding.

## 2021-04-11 NOTE — PROGRESS NOTES
[facility]  Post-Partum Caesarean Section Progress Note    Katherine Chase Patient Status:  Inpatient    1990 MRN [id number]   [facility] 2SW-J Attending Berenice Lechuga MD   Hosp Day # 25 PCP Estefanía Joy MD     SUBJECTIVE:

## 2021-04-11 NOTE — PROGRESS NOTES
Dr Kim Hunter wants endo consult regarding this pt and meds, called to Dr Fermin lazaro sx, msg given and she is to call back.

## 2021-04-11 NOTE — PROGRESS NOTES
NURSING ADMISSION NOTE      Patient admitted via Wheelchair,   Oriented to room. Safety precautions initiated. Bed in low position. Call light in reach. Assessment completed. POC discussed with pt and spouse. Both verbalized understanding of POC.

## 2021-04-12 NOTE — CONSULTS
[facility]    Maternal Fetal Medicine Progress Note    Khanh Gonzalez Patient Status:  Inpatient    1990 MRN [id number]   Location [address] [facility] Attending Wallace Mckeon MD   Hosp Day # 32 PCP Eugenie Andrade MD     SUBJECTIV

## 2021-04-12 NOTE — CM/SW NOTE
met with patient, Julian Staff, to review insurance and PCP for infant in NICU. Julian Staff stated that the infant will be added to her BCBS PPO plan from work. PCP for infant will be Dr. Carrol Encarnacion, with [facility]. Ruthann Woody   discusse

## 2021-04-12 NOTE — PROGRESS NOTES
Postpartum Progress Note    SUBJECTIVE:    Post-op day #3 s/p   section. No overnight events. No complaints. Ambulating, tolerating PO, voiding,  Passing  flatus.   pumping    OBJECTIVE:    Vital signs in last 24 hours:  Temp:  [98.1 °F (36.7 °

## 2021-04-12 NOTE — CM/SW NOTE
SW met with pt to identify needs and provide support resources due to NICU admission baby boy, Sofiya born at 26 weeks.     SW reviewed support services for the NICU including Anila Regalado family room and sleep room areas, NICU facebook page, NICU suppor

## 2021-04-12 NOTE — DIETARY NOTE
France Denis    Admitting diagnosis:  Pregnancy  Preeclampsia, third trimester    Ht: 162.6 cm (5' 4\")  Wt: 125.6 kg (277 lb). Body mass index is 47.55 kg/m².   IBW: 54.6 kg    Wt Readings from Last 6 Encounte RD encouraged pt to order 1 meal and then a higher protein snack between meals. Peds RD will follow up with pt tomorrow for any further questions on specific nutrient intake. Patient reports excellent appetite at this time.  Nursing notes reports Percent Me

## 2021-04-12 NOTE — PAYOR COMM NOTE
--------------  CONTINUED STAY REVIEW    Payor: JONATHON RIOJAS  Subscriber #:  [id number]  Authorization Number: [id number]    Admit date: 3/17/21  Admit time:  3:57 PM    FAXING CLINICAL UPDATE FOR 4/12/21 4/12/21   Postpartum Progress Note     SUBJECTIVE: 4/11/2021 2203 Given 300 mg Oral       ibuprofen (MOTRIN) tab 600 mg     Date Action Dose Route     4/12/2021 1205 Given 600 mg Oral     4/12/2021 0611 Given 600 mg Oral     4/12/2021 0019 Given 600 mg Oral     4/11/2021 1714 Given 600 mg Oral       Labeta

## 2021-04-13 PROBLEM — R73.9 HYPERGLYCEMIA: Status: ACTIVE | Noted: 2021-04-13

## 2021-04-13 RX ORDER — NIFEDIPINE 30 MG/1
30 TABLET, EXTENDED RELEASE ORAL DAILY
Status: DISCONTINUED | OUTPATIENT
Start: 2021-04-13 | End: 2021-04-14

## 2021-04-13 RX ORDER — FUROSEMIDE 20 MG/1
20 TABLET ORAL ONCE
Status: COMPLETED | OUTPATIENT
Start: 2021-04-13 | End: 2021-04-13

## 2021-04-13 NOTE — PROGRESS NOTES
Patent had lunch at 12noon. CMP drawn at 1254, and glucose data was 110. Accu-chek @ 1358 was 577, recheck was 244. But patient's own glucose meter showed her blood glucose level was 113 @ 1400.  Dr. Lucio Johnson phoned, call facility to disregard the result, a

## 2021-04-13 NOTE — PROGRESS NOTES
OB progress note    A/P: 33year old [id number] now POD#4 s/p RLTCS at 32w0d on 4/9/2021 due to oligohydramnios with decreased fetal movement in setting of pre-eclampsia with severe features, gestational diabetes on insulin, morbid obesity, history of c-secti Weight:       Height:         Temp:  [98.5 °F (36.9 °C)-98.6 °F (37 °C)] 98.6 °F (37 °C)  Pulse:  [75-96] 75  Resp:  [18] 18  BP: (144-171)/(78-93) 152/93     • NIFEdipine ER osmotic release  30 mg Oral Daily   • furosemide  20 mg Oral Once   • Labetal

## 2021-04-13 NOTE — PAYOR COMM NOTE
--------------  CONTINUED STAY REVIEW    Payor: JONATHON RIOJAS  Subscriber #:  [id number]  Authorization Number: [id number]    Admit date: 3/17/21  Admit time:  3:57 PM    FAXING CLINICAL UPDATE FOR 4/13/21 4/13/21       A/P: 33year old [id number] now POD#4 s 04/13/21  0428   BP: 155/88 (!) 169/89 144/78 (!) 152/93   BP Location: Left arm Right arm       Pulse: 96 93 85 75   Resp: 18 18       Temp: 98.5 °F (36.9 °C) 98.6 °F (37 °C)       TempSrc: Oral Oral       SpO2:           Weight:           Height:         2108 Given 1,000 mg Oral       NIFEdipine ER osmotic release (PROCARDIA XL) 24 hr tab 30 mg     Date Action Dose Route     4/13/2021 0637 Given 30 mg Oral       Labetalol HCl (NORMODYNE) tab 300 mg     Date Action Dose Route     4/13/2021 1010 Given 300 mg

## 2021-04-14 ENCOUNTER — TELEPHONE (OUTPATIENT)
Dept: OBGYN CLINIC | Facility: CLINIC | Age: 31
End: 2021-04-14

## 2021-04-14 VITALS
SYSTOLIC BLOOD PRESSURE: 139 MMHG | TEMPERATURE: 98 F | HEART RATE: 89 BPM | HEIGHT: 64 IN | BODY MASS INDEX: 47.29 KG/M2 | DIASTOLIC BLOOD PRESSURE: 82 MMHG | RESPIRATION RATE: 18 BRPM | OXYGEN SATURATION: 100 % | WEIGHT: 277 LBS

## 2021-04-14 PROCEDURE — 99238 HOSP IP/OBS DSCHRG MGMT 30/<: CPT | Performed by: OBSTETRICS & GYNECOLOGY

## 2021-04-14 RX ORDER — NIFEDIPINE 30 MG/1
60 TABLET, EXTENDED RELEASE ORAL DAILY
Status: DISCONTINUED | OUTPATIENT
Start: 2021-04-14 | End: 2021-04-14

## 2021-04-14 RX ORDER — LABETALOL 300 MG/1
300 TABLET, FILM COATED ORAL EVERY 12 HOURS SCHEDULED
Qty: 60 TABLET | Refills: 1 | Status: SHIPPED | OUTPATIENT
Start: 2021-04-14 | End: 2021-04-29

## 2021-04-14 RX ORDER — NIFEDIPINE 60 MG/1
60 TABLET, FILM COATED, EXTENDED RELEASE ORAL DAILY
Qty: 30 TABLET | Refills: 1 | Status: SHIPPED | OUTPATIENT
Start: 2021-04-14 | End: 2021-07-31 | Stop reason: ALTCHOICE

## 2021-04-14 NOTE — PROGRESS NOTES
OB attending Update    BP improved s/p Lasix 20 mg PO x 1 dose this AM & on labetalol 300 mg PO BID & nifedipine 30 mg XR daily. Slightly elevated glucose. Potassium WNL  Slightly elevated AST 39.    Anemia Hb 9.0     04/13/21  0751 04/13/21  0900 04/13/2 Brandon Muñiz, MD

## 2021-04-14 NOTE — PLAN OF CARE
Problem: POSTPARTUM  Goal: Long Term Goal:Experiences normal postpartum course  Description: INTERVENTIONS:  - Assess and monitor vital signs and lab values. - Assess fundus and lochia. - Provide ice/sitz baths for perineum discomfort.   - Monitor heali medications or substances incompatible with breast feeding.  - Assess and monitor for signs of nipple pain/trauma. - Instruct and provide assistance with proper latch. - Review techniques for milk expression (breast pumping) and storage of breast milk.  P appropriate and evaluate response  - Consider cultural and social influences on pain and pain management  - Manage/alleviate anxiety  - Utilize distraction and/or relaxation techniques  - Monitor for opioid side effects  - Notify MD/LIP if interventions un etc)  - Arrange for interpreters to assist at discharge as needed  - Consider post-discharge preferences of patient/family/discharge partner  - Complete POLST form as appropriate  - Assess patient's ability to be responsible for managing their own health

## 2021-04-14 NOTE — DISCHARGE SUMMARY
[facility]  Discharge Summary    Delicia Gannon Patient Status:  Inpatient    1990 MRN [id number]   [facility] 2SW-J Attending Rock Avis MD   [address] Day # 28 PCP Marrion Rubinstein, MD     Date of Admission: 3/17/2021    Date NOT CHANGED    Blood Glucose Monitoring Suppl (CONTOUR NEXT ONE) Does not apply Kit  1 kit by Finger stick route daily. Test sugar 4x daily.  Fasting and 2 hours after each meal.  Qty: 1 kit Refills: 0  Associated Diagnoses:Diet controlled gestational diabe

## 2021-04-14 NOTE — PROGRESS NOTES
Discharge instructions reviewed with patient. Teal band explained and given to patient. Patient instructed to call PCP and schedule an appointment for 1 week from discharge to manage blood glucose levels.  Patient instructed to monitor blood glucose levels

## 2021-04-16 ENCOUNTER — TELEPHONE (OUTPATIENT)
Dept: OBGYN UNIT | Facility: HOSPITAL | Age: 31
End: 2021-04-16

## 2021-04-16 ENCOUNTER — NURSE ONLY (OUTPATIENT)
Dept: OBGYN CLINIC | Facility: CLINIC | Age: 31
End: 2021-04-16
Payer: COMMERCIAL

## 2021-04-16 VITALS
DIASTOLIC BLOOD PRESSURE: 64 MMHG | BODY MASS INDEX: 46.1 KG/M2 | WEIGHT: 270 LBS | HEIGHT: 64 IN | HEART RATE: 86 BPM | SYSTOLIC BLOOD PRESSURE: 138 MMHG

## 2021-04-16 RX ORDER — NIFEDIPINE 60 MG/1
60 TABLET, EXTENDED RELEASE ORAL DAILY
COMMUNITY
Start: 2021-04-14 | End: 2021-04-16

## 2021-04-16 RX ORDER — ACETAMINOPHEN 500 MG
500 TABLET ORAL EVERY 6 HOURS PRN
COMMUNITY
End: 2021-07-31

## 2021-04-16 NOTE — PROGRESS NOTES
Reviewed self care w / mom, she verbalizes understanding of instructions reviewed. Encourage to follow up w/ MDs as directed and w/ questions/concerns. Has already had office visit for bp check, sx of BERNADETTE reviewed, visiting in nicu now.

## 2021-04-21 ENCOUNTER — PATIENT MESSAGE (OUTPATIENT)
Dept: OBGYN CLINIC | Facility: CLINIC | Age: 31
End: 2021-04-21

## 2021-04-21 NOTE — TELEPHONE ENCOUNTER
From: An Lorenzo  To: Ken Coronado DO  Sent: 4/21/2021 9:59 AM CDT  Subject: Visit Follow-up Question    Hello I'm trying to make a appointment for my after care from having my baby in April 9th.  I think I'm suppose to schedule a 6 week check

## 2021-04-23 ENCOUNTER — NURSE ONLY (OUTPATIENT)
Dept: OBGYN CLINIC | Facility: CLINIC | Age: 31
End: 2021-04-23
Payer: COMMERCIAL

## 2021-04-23 VITALS
DIASTOLIC BLOOD PRESSURE: 62 MMHG | BODY MASS INDEX: 45.58 KG/M2 | HEIGHT: 64 IN | SYSTOLIC BLOOD PRESSURE: 128 MMHG | WEIGHT: 267 LBS | HEART RATE: 87 BPM

## 2021-04-26 ENCOUNTER — TELEPHONE (OUTPATIENT)
Dept: OBGYN CLINIC | Facility: CLINIC | Age: 31
End: 2021-04-26

## 2021-04-29 ENCOUNTER — OFFICE VISIT (OUTPATIENT)
Dept: INTERNAL MEDICINE CLINIC | Facility: CLINIC | Age: 31
End: 2021-04-29
Payer: COMMERCIAL

## 2021-04-29 VITALS
SYSTOLIC BLOOD PRESSURE: 128 MMHG | BODY MASS INDEX: 45.75 KG/M2 | WEIGHT: 268 LBS | DIASTOLIC BLOOD PRESSURE: 87 MMHG | HEIGHT: 64 IN | OXYGEN SATURATION: 99 % | HEART RATE: 87 BPM

## 2021-04-29 DIAGNOSIS — G47.33 OSA (OBSTRUCTIVE SLEEP APNEA): ICD-10-CM

## 2021-04-29 DIAGNOSIS — I10 ESSENTIAL HYPERTENSION: Primary | ICD-10-CM

## 2021-04-29 DIAGNOSIS — E66.9 OBESITY (BMI 30-39.9): ICD-10-CM

## 2021-04-29 DIAGNOSIS — Z86.32 HISTORY OF GESTATIONAL DIABETES: ICD-10-CM

## 2021-04-29 PROCEDURE — 3074F SYST BP LT 130 MM HG: CPT | Performed by: INTERNAL MEDICINE

## 2021-04-29 PROCEDURE — 99214 OFFICE O/P EST MOD 30 MIN: CPT | Performed by: INTERNAL MEDICINE

## 2021-04-29 PROCEDURE — 3079F DIAST BP 80-89 MM HG: CPT | Performed by: INTERNAL MEDICINE

## 2021-04-29 PROCEDURE — 3008F BODY MASS INDEX DOCD: CPT | Performed by: INTERNAL MEDICINE

## 2021-04-29 RX ORDER — LABETALOL 300 MG/1
300 TABLET, FILM COATED ORAL EVERY 12 HOURS SCHEDULED
Qty: 60 TABLET | Refills: 1 | Status: SHIPPED | OUTPATIENT
Start: 2021-04-29 | End: 2021-05-03

## 2021-04-29 NOTE — PROGRESS NOTES
HPI:    Patient ID: Khanh Gonzalez is a 33year old female. HPI    Patient  is postpartum. She had severe preeclamsia during 3rd trimester with gestational DM. She stayed at the hospital for 4 weeks until she gave birth.   Received Lasix, labetalol a trimester 12/09/2020    Positive test 12/9/2020. Did not take Lovenox.     • Decorative tattoo    • Genital herpes simplex    • Gestational diabetes mellitus (GDM) requiring insulin 03/17/2021    Insulin   • Gonorrhea 01/2014    treated   • Heart murmur SOCIALLY    Drug use: No       Review of Systems   Constitutional: Negative for activity change, appetite change, fatigue and fever. HENT: Negative for congestion. Eyes: Negative for visual disturbance.    Respiratory: Negative for cough, chest tightne Wt Readings from Last 6 Encounters:  04/30/21 : 270 lb 12.8 oz (122.8 kg)  04/29/21 : 268 lb (121.6 kg)  04/23/21 : 267 lb (121.1 kg)  04/16/21 : 270 lb (122.5 kg)  03/24/21 : 277 lb (125.6 kg)  03/17/21 : 278 lb (126.1 kg)     BP Readings from Last 3 week.  - Weight reduction  - No over the counter decongestants  - Limit NSAID use  - Limit alcohol consumption   -Recommend home blood pressure monitoring. Instructed patient on proper technique for taking blood pressure.  Goal blood pressure readings shoul

## 2021-04-30 ENCOUNTER — NURSE ONLY (OUTPATIENT)
Dept: OBGYN CLINIC | Facility: CLINIC | Age: 31
End: 2021-04-30
Payer: COMMERCIAL

## 2021-04-30 VITALS
WEIGHT: 270.81 LBS | SYSTOLIC BLOOD PRESSURE: 138 MMHG | DIASTOLIC BLOOD PRESSURE: 72 MMHG | HEIGHT: 64 IN | BODY MASS INDEX: 46.23 KG/M2

## 2021-04-30 RX ORDER — PRENATAL WITH FERROUS FUM AND FOLIC ACID 3080; 920; 120; 400; 22; 1.84; 3; 20; 10; 1; 12; 200; 27; 25; 2 [IU]/1; [IU]/1; MG/1; [IU]/1; MG/1; MG/1; MG/1; MG/1; MG/1; MG/1; UG/1; MG/1; MG/1; MG/1; MG/1
TABLET ORAL
COMMUNITY
Start: 2021-03-19 | End: 2021-11-30

## 2021-05-03 ENCOUNTER — PATIENT MESSAGE (OUTPATIENT)
Dept: INTERNAL MEDICINE CLINIC | Facility: CLINIC | Age: 31
End: 2021-05-03

## 2021-05-03 ENCOUNTER — IMMUNIZATION (OUTPATIENT)
Dept: LAB | Facility: HOSPITAL | Age: 31
End: 2021-05-03
Attending: EMERGENCY MEDICINE
Payer: COMMERCIAL

## 2021-05-03 DIAGNOSIS — Z23 NEED FOR VACCINATION: Primary | ICD-10-CM

## 2021-05-03 PROCEDURE — 0011A SARSCOV2 VAC 100MCG/0.5ML IM: CPT

## 2021-05-03 RX ORDER — LABETALOL 300 MG/1
300 TABLET, FILM COATED ORAL EVERY 12 HOURS SCHEDULED
Qty: 60 TABLET | Refills: 1 | Status: SHIPPED | OUTPATIENT
Start: 2021-05-03 | End: 2021-07-31 | Stop reason: ALTCHOICE

## 2021-05-03 NOTE — TELEPHONE ENCOUNTER
ASSESSMENT/PLAN:   1. Essential hypertension  -Recent pregnancy with preeclampsia  - Salt reduction 4 mg daily at minimum. Watch salt intake closely    - Recommend routine exercise, at least 30 minutes 4 times a week.   - Weight reduction  - No over the cou

## 2021-05-03 NOTE — TELEPHONE ENCOUNTER
From: Jasmine Rowe  To: Jacki Howard MD  Sent: 5/3/2021 10:59 AM CDT  Subject: Prescription Question    Hello I met with you on Thursday of last week and all my medications refill were sent to [facility] but my insurance stated this weekend

## 2021-05-21 ENCOUNTER — POSTPARTUM (OUTPATIENT)
Dept: OBGYN CLINIC | Facility: CLINIC | Age: 31
End: 2021-05-21
Payer: COMMERCIAL

## 2021-05-21 VITALS — WEIGHT: 263.81 LBS | DIASTOLIC BLOOD PRESSURE: 83 MMHG | BODY MASS INDEX: 45 KG/M2 | SYSTOLIC BLOOD PRESSURE: 138 MMHG

## 2021-05-21 PROBLEM — R73.9 HYPERGLYCEMIA: Status: RESOLVED | Noted: 2021-04-13 | Resolved: 2021-05-21

## 2021-05-21 PROBLEM — Z34.90 PREGNANCY: Status: RESOLVED | Noted: 2021-02-28 | Resolved: 2021-05-21

## 2021-05-21 PROBLEM — E66.9 OBESITY (BMI 30-39.9): Status: RESOLVED | Noted: 2018-05-09 | Resolved: 2021-05-21

## 2021-05-21 PROBLEM — O09.299 HX OF PREECLAMPSIA, PRIOR PREGNANCY, CURRENTLY PREGNANT: Status: RESOLVED | Noted: 2020-11-23 | Resolved: 2021-05-21

## 2021-05-21 PROBLEM — O99.213 MATERNAL MORBID OBESITY IN THIRD TRIMESTER, ANTEPARTUM (HCC): Status: RESOLVED | Noted: 2020-11-23 | Resolved: 2021-05-21

## 2021-05-21 PROBLEM — O14.93 PREECLAMPSIA, THIRD TRIMESTER: Status: RESOLVED | Noted: 2021-03-17 | Resolved: 2021-05-21

## 2021-05-21 PROBLEM — M17.12 PRIMARY OSTEOARTHRITIS OF LEFT KNEE: Status: RESOLVED | Noted: 2017-10-26 | Resolved: 2021-05-21

## 2021-05-21 PROBLEM — G47.33 OSA ON CPAP: Status: RESOLVED | Noted: 2017-10-26 | Resolved: 2021-05-21

## 2021-05-21 PROBLEM — Z99.89 OSA ON CPAP: Status: RESOLVED | Noted: 2017-10-26 | Resolved: 2021-05-21

## 2021-05-21 PROBLEM — E66.01 MORBID OBESITY DUE TO EXCESS CALORIES (HCC): Status: RESOLVED | Noted: 2017-07-12 | Resolved: 2021-05-21

## 2021-05-21 PROBLEM — O41.03X0 OLIGOHYDRAMNIOS IN THIRD TRIMESTER: Status: RESOLVED | Noted: 2021-04-07 | Resolved: 2021-05-21

## 2021-05-21 PROBLEM — O24.414 INSULIN CONTROLLED GESTATIONAL DIABETES MELLITUS (GDM) IN THIRD TRIMESTER: Status: RESOLVED | Noted: 2021-03-08 | Resolved: 2021-05-21

## 2021-05-21 PROBLEM — R63.2 INCREASED APPETITE: Status: RESOLVED | Noted: 2020-04-16 | Resolved: 2021-05-21

## 2021-05-21 PROBLEM — R63.5 WEIGHT GAIN: Status: RESOLVED | Noted: 2019-07-11 | Resolved: 2021-05-21

## 2021-05-21 PROBLEM — Z88.3: Status: RESOLVED | Noted: 2020-11-23 | Resolved: 2021-05-21

## 2021-05-21 PROBLEM — Z86.69 HISTORY OF MIGRAINE HEADACHES: Status: RESOLVED | Noted: 2018-06-26 | Resolved: 2021-05-21

## 2021-05-21 PROBLEM — M54.50 LOW BACK PAIN: Status: RESOLVED | Noted: 2020-11-23 | Resolved: 2021-05-21

## 2021-05-21 PROBLEM — A60.00 HERPES GENITALIA: Status: RESOLVED | Noted: 2020-11-23 | Resolved: 2021-05-21

## 2021-05-21 PROBLEM — Z98.891 HISTORY OF 2 CESAREAN SECTIONS: Status: RESOLVED | Noted: 2020-11-23 | Resolved: 2021-05-21

## 2021-05-21 PROBLEM — U07.1 COVID-19 AFFECTING PREGNANCY IN SECOND TRIMESTER: Status: RESOLVED | Noted: 2020-12-10 | Resolved: 2021-05-21

## 2021-05-21 PROBLEM — E66.01 MATERNAL MORBID OBESITY IN THIRD TRIMESTER, ANTEPARTUM (HCC): Status: RESOLVED | Noted: 2020-11-23 | Resolved: 2021-05-21

## 2021-05-21 PROBLEM — O34.219 PREGNANCY WITH HISTORY OF CESAREAN SECTION, ANTEPARTUM: Status: RESOLVED | Noted: 2021-03-17 | Resolved: 2021-05-21

## 2021-05-21 PROBLEM — O98.512 COVID-19 AFFECTING PREGNANCY IN SECOND TRIMESTER: Status: RESOLVED | Noted: 2020-12-10 | Resolved: 2021-05-21

## 2021-05-21 PROCEDURE — 3079F DIAST BP 80-89 MM HG: CPT | Performed by: OBSTETRICS & GYNECOLOGY

## 2021-05-21 PROCEDURE — 3075F SYST BP GE 130 - 139MM HG: CPT | Performed by: OBSTETRICS & GYNECOLOGY

## 2021-05-21 NOTE — PROGRESS NOTES
Leonila Dominique is a 33year old female [id number] here for 6 week post-partum visit. Delivered via rCSx at [facility] at 32 wks after being hospitalized since 28 wks. Baby now 3 wks old & just sent home recently. Patient desires condoms for contraception. attempted termination at \"5 months\" but went back the following day to have the laminaria removed. She feels is starting of a termination process is what caused the preeclampsia. 200 - male \"Pepito\" - Term repeat . Not on aspirin.  FOB is General:    well nourished, well developed woman in no acute distress  Abdomen:    soft, nontender, no masses Incision clean / dry / intact  External Genitalia:  normal appearance, hair distribution, and no lesions  Vagina:    normal appearance without l

## 2021-06-01 ENCOUNTER — IMMUNIZATION (OUTPATIENT)
Dept: LAB | Facility: HOSPITAL | Age: 31
End: 2021-06-01
Attending: EMERGENCY MEDICINE
Payer: COMMERCIAL

## 2021-06-01 DIAGNOSIS — Z23 NEED FOR VACCINATION: Primary | ICD-10-CM

## 2021-06-01 PROCEDURE — 0012A SARSCOV2 VAC 100MCG/0.5ML IM: CPT

## 2021-06-04 ENCOUNTER — OFFICE VISIT (OUTPATIENT)
Dept: NEUROLOGY | Facility: CLINIC | Age: 31
End: 2021-06-04
Payer: COMMERCIAL

## 2021-06-04 ENCOUNTER — TELEPHONE (OUTPATIENT)
Dept: NEUROLOGY | Facility: CLINIC | Age: 31
End: 2021-06-04

## 2021-06-04 VITALS
DIASTOLIC BLOOD PRESSURE: 64 MMHG | OXYGEN SATURATION: 97 % | HEIGHT: 64 IN | HEART RATE: 79 BPM | SYSTOLIC BLOOD PRESSURE: 134 MMHG | BODY MASS INDEX: 44.9 KG/M2 | WEIGHT: 263 LBS

## 2021-06-04 DIAGNOSIS — M65.4 TENOSYNOVITIS, DE QUERVAIN: ICD-10-CM

## 2021-06-04 DIAGNOSIS — S33.5XXA LUMBAR SPRAIN, INITIAL ENCOUNTER: Primary | ICD-10-CM

## 2021-06-04 PROCEDURE — 3008F BODY MASS INDEX DOCD: CPT | Performed by: PHYSICAL MEDICINE & REHABILITATION

## 2021-06-04 PROCEDURE — 3075F SYST BP GE 130 - 139MM HG: CPT | Performed by: PHYSICAL MEDICINE & REHABILITATION

## 2021-06-04 PROCEDURE — 99214 OFFICE O/P EST MOD 30 MIN: CPT | Performed by: PHYSICAL MEDICINE & REHABILITATION

## 2021-06-04 PROCEDURE — 3078F DIAST BP <80 MM HG: CPT | Performed by: PHYSICAL MEDICINE & REHABILITATION

## 2021-06-04 NOTE — PROGRESS NOTES
[address]  Progress Note    CHIEF COMPLAINT:  Patient presents with:  Low Back Pain: Patient just gave birth on 4/9. Bilateral low back pain. Pain is a 5/10. Pain comes and goes.  Sitting too long still make IUGR.    • History of prior pregnancy with IUGR     • Hypertension     Had pre clampsia during pregnancy and pt was induced to have her baby.     • Low back pain during pregnancy in first trimester 2020    Hospitalized with severe low back patrice tablet 0   • metFORMIN HCl 500 MG Oral Tab Take 1 tablet (500 mg total) by mouth daily with breakfast. 90 tablet 0   • Prenatal 27-1 MG Oral Tab      • acetaminophen 500 MG Oral Tab Take 500 mg by mouth every 6 (six) hours as needed for Pain.      • BLAKE BROWN strength  Sensation: Lower upper extremities  Reflexes: Lower upper extremities  SLR: neg    Pos dequervain sign and back flexion hurst no neuro. .     Data    Radiology Imaging:  A left wrist x-ray from March was normal    ASSESSMENT AND PLAN:  1.  Lumbar

## 2021-06-04 NOTE — TELEPHONE ENCOUNTER
Contacted [employer] online Transaction ID: [id number] to initiate authorization for Left wrist tendon sheath injection CPT 90053+ (33705) to be done in office.   Coverage is active  Status: Approved-authorization is not required per health plan    rout

## 2021-06-06 PROBLEM — M65.4 TENOSYNOVITIS, DE QUERVAIN: Status: ACTIVE | Noted: 2021-06-06

## 2021-06-06 PROBLEM — S33.5XXA LUMBAR SPRAIN: Status: ACTIVE | Noted: 2021-06-06

## 2021-06-07 ENCOUNTER — HOSPITAL ENCOUNTER (OUTPATIENT)
Dept: GENERAL RADIOLOGY | Facility: HOSPITAL | Age: 31
Discharge: HOME | End: 2021-06-07
Attending: PHYSICAL MEDICINE & REHABILITATION
Payer: COMMERCIAL

## 2021-06-07 ENCOUNTER — TELEPHONE (OUTPATIENT)
Dept: NEUROLOGY | Facility: CLINIC | Age: 31
End: 2021-06-07

## 2021-06-07 DIAGNOSIS — S33.5XXA LUMBAR SPRAIN, INITIAL ENCOUNTER: ICD-10-CM

## 2021-06-07 PROCEDURE — 72100 X-RAY EXAM L-S SPINE 2/3 VWS: CPT | Performed by: PHYSICAL MEDICINE & REHABILITATION

## 2021-06-07 NOTE — TELEPHONE ENCOUNTER
----- Message from Fabian Olmos MD sent at 6/7/2021 11:17 AM CDT -----  I personally reviewed a plain film x-ray of the lumbar spine showing facet arthropathy, the left SI joint is hard to see but this is due to rotation.   Visualized hip joints look g

## 2021-06-08 ENCOUNTER — TELEPHONE (OUTPATIENT)
Dept: NEUROLOGY | Facility: CLINIC | Age: 31
End: 2021-06-08

## 2021-06-08 NOTE — TELEPHONE ENCOUNTER
So patient is not available for sedentary work restrictions. Patient requesting a note to be off until 12 sessions of physical therapy completed and injection 6/30/21. Please advise.

## 2021-06-08 NOTE — TELEPHONE ENCOUNTER
The current note clearly states that if these restrictions are not available that she should be considered off work. Therefore the current note is sufficient.

## 2021-06-09 NOTE — TELEPHONE ENCOUNTER
The pt stated she was in the building for another appt and stopped by the office to talk to the nurse since she keeps on missing the nurse calls. Pt requested the nurse please call her back.

## 2021-06-18 ENCOUNTER — TELEPHONE (OUTPATIENT)
Dept: PHYSICAL THERAPY | Facility: HOSPITAL | Age: 31
End: 2021-06-18

## 2021-06-22 ENCOUNTER — TELEPHONE (OUTPATIENT)
Dept: PHYSICAL THERAPY | Facility: HOSPITAL | Age: 31
End: 2021-06-22

## 2021-06-23 ENCOUNTER — OFFICE VISIT (OUTPATIENT)
Dept: PHYSICAL THERAPY | Facility: HOSPITAL | Age: 31
End: 2021-06-23
Attending: PHYSICAL MEDICINE & REHABILITATION
Payer: OTHER MISCELLANEOUS

## 2021-06-23 DIAGNOSIS — S33.5XXA LUMBAR SPRAIN, INITIAL ENCOUNTER: ICD-10-CM

## 2021-06-23 PROCEDURE — 97110 THERAPEUTIC EXERCISES: CPT

## 2021-06-23 PROCEDURE — 97162 PT EVAL MOD COMPLEX 30 MIN: CPT

## 2021-06-23 NOTE — PROGRESS NOTES
LUMBAR SPINE EVALUATION:   Referring Physician: Dr. Rajani Pérez  Diagnosis: Lumbar sprain, initial encounter (S33.5XXA)    Date of Service: 6/23/2021     Pt 10 mins late for session accommodated with 35 min session.     PATIENT SUMMARY   Carmen Frost is due to low back pain but is difficult to assess due to new born at home. Past Medical History:   Diagnosis Date   • Abnormal Pap smear of cervix     HPV+. Colpo-8/2013.    • Chlamydia 12/16/2016   • COVID-19 affecting pregnancy in second trimester 12/0 spine  CONCLUSION:   1. Minimal dextro scoliosis which could be positional in nature. 2. Otherwise, negative study.      Screening Questions:   Increased Symptoms with: Cough / Sneeze / Strain: Pt denies  Bladder/Bowel Function: Pt denies  Gait Dysfunctio 5/5   Quads (L3) 5/5 5/5   Ant Tib (L4) 5/5 5/5   Great Toe Ext (L5) 5/5 5/5   PF (S1) 5/5 5/5   Hamstrings (S2) 5/5 5/5     Dermatomes  Light touch Left Right   Proximal medial thigh (L2) WNL WNL   Above knee (L3) WNL WNL   Medial arch (L4) WNL WNL   Betw will report she is sherie to stand for 30 mins with 2/10 pain or less to demonstrate progress towards return to work status. Frequency / Duration: Patient will be seen for 2 x/week or a total of 12 visits over a 90 day period. Treatment will include:  Man

## 2021-06-24 ENCOUNTER — APPOINTMENT (OUTPATIENT)
Dept: PHYSICAL THERAPY | Facility: HOSPITAL | Age: 31
End: 2021-06-24
Attending: PHYSICAL MEDICINE & REHABILITATION
Payer: OTHER MISCELLANEOUS

## 2021-06-24 ENCOUNTER — TELEPHONE (OUTPATIENT)
Dept: PHYSICAL THERAPY | Facility: HOSPITAL | Age: 31
End: 2021-06-24

## 2021-06-30 ENCOUNTER — OFFICE VISIT (OUTPATIENT)
Dept: NEUROLOGY | Facility: CLINIC | Age: 31
End: 2021-06-30
Payer: COMMERCIAL

## 2021-06-30 DIAGNOSIS — S33.5XXA LUMBAR SPRAIN, INITIAL ENCOUNTER: ICD-10-CM

## 2021-06-30 DIAGNOSIS — M65.4 TENOSYNOVITIS, DE QUERVAIN: Primary | ICD-10-CM

## 2021-06-30 PROCEDURE — 20550 NJX 1 TENDON SHEATH/LIGAMENT: CPT | Performed by: PHYSICAL MEDICINE & REHABILITATION

## 2021-06-30 RX ORDER — TRIAMCINOLONE ACETONIDE 40 MG/ML
40 INJECTION, SUSPENSION INTRA-ARTICULAR; INTRAMUSCULAR ONCE
Status: COMPLETED | OUTPATIENT
Start: 2021-06-30 | End: 2021-06-30

## 2021-06-30 RX ORDER — LIDOCAINE HYDROCHLORIDE 10 MG/ML
2 INJECTION, SOLUTION INFILTRATION; PERINEURAL ONCE
Status: COMPLETED | OUTPATIENT
Start: 2021-06-30 | End: 2021-06-30

## 2021-07-01 ENCOUNTER — OFFICE VISIT (OUTPATIENT)
Dept: PHYSICAL THERAPY | Facility: HOSPITAL | Age: 31
End: 2021-07-01
Attending: PHYSICAL MEDICINE & REHABILITATION
Payer: OTHER MISCELLANEOUS

## 2021-07-01 PROCEDURE — 97110 THERAPEUTIC EXERCISES: CPT

## 2021-07-01 NOTE — PROGRESS NOTES
Dx: Lumbar sprain, initial encounter (S33.5XXA)            Authorized # of Visits:  2/12         Next MD visit: Dr. Melissa Dorantes None Scheduled  Fall Risk: Standard      Precautions: N/A           Subjective:  Pt reports back is a little stiff.  Pt reports she use

## 2021-07-01 NOTE — PROCEDURES
De Quervain's tendon sheath injection  Location: left  After discussing benefits and possible side effects, we proceeded with a tendon sheath injection. The patient was consented. The patient's hand was placed on the examination table.   The radial wrist w

## 2021-07-06 ENCOUNTER — TELEPHONE (OUTPATIENT)
Dept: PHYSICAL THERAPY | Facility: HOSPITAL | Age: 31
End: 2021-07-06

## 2021-07-06 ENCOUNTER — APPOINTMENT (OUTPATIENT)
Dept: PHYSICAL THERAPY | Facility: HOSPITAL | Age: 31
End: 2021-07-06
Attending: PHYSICAL MEDICINE & REHABILITATION
Payer: OTHER MISCELLANEOUS

## 2021-07-07 ENCOUNTER — OFFICE VISIT (OUTPATIENT)
Dept: PHYSICAL THERAPY | Facility: HOSPITAL | Age: 31
End: 2021-07-07
Attending: PHYSICAL MEDICINE & REHABILITATION
Payer: OTHER MISCELLANEOUS

## 2021-07-07 PROCEDURE — 97110 THERAPEUTIC EXERCISES: CPT

## 2021-07-07 NOTE — PROGRESS NOTES
Dx: Lumbar sprain, initial encounter (S33.5XXA)            Authorized # of Visits:  2/12         Next MD visit: Dr. Torsten Isbell None Scheduled  Fall Risk: Standard      Precautions: N/A           Subjective:  Pt reports pain 5/10 today.  \"It's ok today\"  Pt mis Kenyon 3 Total Timed Treatment: 38 min     Total Treatment Time: 38 min

## 2021-07-08 ENCOUNTER — OFFICE VISIT (OUTPATIENT)
Dept: PHYSICAL THERAPY | Facility: HOSPITAL | Age: 31
End: 2021-07-08
Attending: PHYSICAL MEDICINE & REHABILITATION
Payer: OTHER MISCELLANEOUS

## 2021-07-08 PROCEDURE — 97110 THERAPEUTIC EXERCISES: CPT

## 2021-07-08 NOTE — PROGRESS NOTES
Dx: Lumbar sprain, initial encounter (S33.5XXA)            Authorized # of Visits:  4/12         Next MD visit: Dr. Marlo Bumpers None Scheduled  Fall Risk: Standard      Precautions: N/A           Subjective:  Pt reports she was able to take a 30 min walk today f with focus on EXT based protocol.      Charges: Kenyon 3 Total Timed Treatment: 45min     Total Treatment Time: 45min

## 2021-07-21 ENCOUNTER — OFFICE VISIT (OUTPATIENT)
Dept: PHYSICAL THERAPY | Facility: HOSPITAL | Age: 31
End: 2021-07-21
Attending: PHYSICAL MEDICINE & REHABILITATION
Payer: OTHER MISCELLANEOUS

## 2021-07-21 PROCEDURE — 97110 THERAPEUTIC EXERCISES: CPT

## 2021-07-21 NOTE — PROGRESS NOTES
Dx: Lumbar sprain, initial encounter (S33.5XXA)            Authorized # of Visits:  4/12         Next MD visit: Dr. Karo Fermin None Scheduled  Fall Risk: Standard      Precautions: N/A           Subjective:  Pt reports pain 5/10 due to not getting much sleep la Charges: Kenyon 3 Total Timed Treatment: 45min     Total Treatment Time: 45min

## 2021-07-22 ENCOUNTER — TELEPHONE (OUTPATIENT)
Dept: NEUROLOGY | Facility: CLINIC | Age: 31
End: 2021-07-22

## 2021-07-23 ENCOUNTER — OFFICE VISIT (OUTPATIENT)
Dept: PHYSICAL THERAPY | Facility: HOSPITAL | Age: 31
End: 2021-07-23
Attending: PHYSICAL MEDICINE & REHABILITATION
Payer: OTHER MISCELLANEOUS

## 2021-07-23 PROCEDURE — 97110 THERAPEUTIC EXERCISES: CPT

## 2021-07-23 PROCEDURE — 97112 NEUROMUSCULAR REEDUCATION: CPT

## 2021-07-23 NOTE — PROGRESS NOTES
Dx: Lumbar sprain, initial encounter (S33.5XXA)            Authorized # of Visits:  6/12         Next MD visit: Dr. Sarita Cogan None Scheduled  Fall Risk: Standard      Precautions: N/A           Subjective:  Pt reports 2/10 low back pain  Objective:   See below pain or less to demonstrate progress towards return to work status. Plan:  Continue with POC with focus on EXT based protocol.      Charges: Kenyon 2 Neuro 1 Total Timed Treatment: 45min     Total Treatment Time: 45min

## 2021-07-27 ENCOUNTER — APPOINTMENT (OUTPATIENT)
Dept: PHYSICAL THERAPY | Facility: HOSPITAL | Age: 31
End: 2021-07-27
Attending: PHYSICAL MEDICINE & REHABILITATION
Payer: OTHER MISCELLANEOUS

## 2021-07-27 ENCOUNTER — APPOINTMENT (OUTPATIENT)
Dept: PHYSICAL THERAPY | Facility: HOSPITAL | Age: 31
End: 2021-07-27
Payer: COMMERCIAL

## 2021-07-27 ENCOUNTER — TELEPHONE (OUTPATIENT)
Dept: PHYSICAL THERAPY | Facility: HOSPITAL | Age: 31
End: 2021-07-27

## 2021-07-27 NOTE — TELEPHONE ENCOUNTER
Pt called regarding no-show visit for today. Pt stated she thought her visit was later this afternoon. Rescheduled for Thursday at 2:30.

## 2021-07-29 ENCOUNTER — OFFICE VISIT (OUTPATIENT)
Dept: PHYSICAL THERAPY | Facility: HOSPITAL | Age: 31
End: 2021-07-29
Attending: PHYSICAL MEDICINE & REHABILITATION
Payer: OTHER MISCELLANEOUS

## 2021-07-29 PROCEDURE — 97110 THERAPEUTIC EXERCISES: CPT

## 2021-07-29 NOTE — PROGRESS NOTES
Dx: Lumbar sprain, initial encounter (S33.5XXA)            Authorized # of Visits:  7/12         Next MD visit: Dr. Martine Urena None Scheduled  Fall Risk: Standard      Precautions: N/A         Subjective:  Pt reports that overall her low back pain has been impr Limitation: 25% Limited No Limitation Comments   Flexion X     (+) pain   Extension X (initial) X (current)      R Side Glide  X (initial)  X (current)    L Side Glide  X (initial)  X (current)              Myotomes Left Right   Hip Flexion (L1-2) 5/5 5/5 D Ninfa Dang will report she is sherie to stand for 30 mins with 2/10 pain or less to demonstrate progress towards return to work status.  - Making progress towards LTG attainment    Plan:  Continue with POC with focus on EXT based protocol and LE strengthenin

## 2021-07-30 ENCOUNTER — OFFICE VISIT (OUTPATIENT)
Dept: PHYSICAL THERAPY | Facility: HOSPITAL | Age: 31
End: 2021-07-30
Attending: PHYSICAL MEDICINE & REHABILITATION
Payer: OTHER MISCELLANEOUS

## 2021-07-30 ENCOUNTER — OFFICE VISIT (OUTPATIENT)
Dept: NEUROLOGY | Facility: CLINIC | Age: 31
End: 2021-07-30
Payer: COMMERCIAL

## 2021-07-30 VITALS
SYSTOLIC BLOOD PRESSURE: 134 MMHG | WEIGHT: 250 LBS | HEIGHT: 64 IN | HEART RATE: 78 BPM | BODY MASS INDEX: 42.68 KG/M2 | DIASTOLIC BLOOD PRESSURE: 66 MMHG | OXYGEN SATURATION: 97 %

## 2021-07-30 DIAGNOSIS — S33.5XXA LUMBAR SPRAIN, INITIAL ENCOUNTER: Primary | ICD-10-CM

## 2021-07-30 DIAGNOSIS — M65.4 TENOSYNOVITIS, DE QUERVAIN: ICD-10-CM

## 2021-07-30 PROCEDURE — 99213 OFFICE O/P EST LOW 20 MIN: CPT | Performed by: PHYSICAL MEDICINE & REHABILITATION

## 2021-07-30 PROCEDURE — 3078F DIAST BP <80 MM HG: CPT | Performed by: PHYSICAL MEDICINE & REHABILITATION

## 2021-07-30 PROCEDURE — 3075F SYST BP GE 130 - 139MM HG: CPT | Performed by: PHYSICAL MEDICINE & REHABILITATION

## 2021-07-30 PROCEDURE — 3008F BODY MASS INDEX DOCD: CPT | Performed by: PHYSICAL MEDICINE & REHABILITATION

## 2021-07-30 PROCEDURE — 97110 THERAPEUTIC EXERCISES: CPT

## 2021-07-30 NOTE — PROGRESS NOTES
Dx: Lumbar sprain, initial encounter (S33.5XXA)            Authorized # of Visits:  7/12         Next MD visit: Dr. Jaclyn Ibarra None Scheduled  Fall Risk: Standard      Precautions: N/A         Subjective:  Pt reports positive follow up with Dr. Jaclyn Ibarra this AM. Byron Quintanilla functional performance. 2. Marichuyyakov Enio will demonstrate she is able to lift 30lbs from floor to waist with 2/10 pain or less to demonstrate progress towards return to work status. - Making progress towards LTG attainment  3.  Fadumo Steen jori

## 2021-07-30 NOTE — PROGRESS NOTES
[address]  Progress Note    CHIEF COMPLAINT:  Patient presents with:  Back Pain: LOV 6/30/21 Pt comes in for f/u back, still aches. Lifting makes worse. PT helped 70%. Takes Tylenol for pain when needed.  Rat prior pregnancy with IUGR     • Hypertension     Had pre clampsia during pregnancy and pt was induced to have her baby.     • Low back pain during pregnancy in first trimester 2020    Hospitalized with severe low back pain in 1st trimester of Sulfate-FA (PRENATAL VITAMIN OR) Take by mouth.          ALLERGIES:     Povidone Iodine         RASH    REVIEW OF SYSTEMS:   Patient-reported ROS  Constitutional  Sleep Disturbance: denies   Cardiovascular  Chest Pain: denies  Irregular Heartbeat: denies anticipation of releasing her to full duty work next time. There is no partial duty. 2. Tenosynovitis, de Quervain  Resolved    No orders of the defined types were placed in this encounter.     Work Duty Status:   Work Related: Yes  MMI:No  Work Status:

## 2021-08-02 ENCOUNTER — TELEPHONE (OUTPATIENT)
Dept: NEUROLOGY | Facility: CLINIC | Age: 31
End: 2021-08-02

## 2021-08-02 NOTE — TELEPHONE ENCOUNTER
Received letter from [employer] RN Case Manager requesting 7/30/21 office note/orders/work status   Claim #[id number] DOI 3/1/21  Steffany direct # [phone]  Direct fax # [phone]    Clinicals from 7/30/21 office visit faxed to above

## 2021-08-04 ENCOUNTER — OFFICE VISIT (OUTPATIENT)
Dept: PHYSICAL THERAPY | Facility: HOSPITAL | Age: 31
End: 2021-08-04
Attending: PHYSICAL MEDICINE & REHABILITATION
Payer: OTHER MISCELLANEOUS

## 2021-08-04 PROCEDURE — 97110 THERAPEUTIC EXERCISES: CPT

## 2021-08-04 NOTE — PROGRESS NOTES
Dx: Lumbar sprain, initial encounter (S33.5XXA)            Authorized # of Visits:           Next MD visit: Dr. Thelbert Apgar None Scheduled  Fall Risk: Standard      Precautions: N/A         Subjective:  Pt reports her back is \"feeling pretty good today\" to demonstrate return to maximum functional performance. 2. Thersia Fix will demonstrate she is able to lift 30lbs from floor to waist with 2/10 pain or less to demonstrate progress towards return to work status.  - Making progress towards LTG vj

## 2021-08-06 ENCOUNTER — APPOINTMENT (OUTPATIENT)
Dept: PHYSICAL THERAPY | Facility: HOSPITAL | Age: 31
End: 2021-08-06
Attending: PHYSICAL MEDICINE & REHABILITATION
Payer: OTHER MISCELLANEOUS

## 2021-08-06 ENCOUNTER — TELEPHONE (OUTPATIENT)
Dept: PHYSICAL THERAPY | Facility: HOSPITAL | Age: 31
End: 2021-08-06

## 2021-08-11 ENCOUNTER — OFFICE VISIT (OUTPATIENT)
Dept: PHYSICAL THERAPY | Facility: HOSPITAL | Age: 31
End: 2021-08-11
Attending: PHYSICAL MEDICINE & REHABILITATION
Payer: OTHER MISCELLANEOUS

## 2021-08-11 PROCEDURE — 97110 THERAPEUTIC EXERCISES: CPT

## 2021-08-11 NOTE — PROGRESS NOTES
Dx: Lumbar sprain, initial encounter (S33.5XXA)            Authorized # of Visits:  8/12         Next MD visit: Dr. Jaclyn Ibarra None Scheduled  Fall Risk: Standard      Precautions: N/A         Subjective:  Pt reports her back is \"feeling pretty good today\" score of 69/100 on FOTO to demonstrate return to maximum functional performance. 2. Dimple Latesha will demonstrate she is able to lift 30lbs from floor to waist with 2/10 pain or less to demonstrate progress towards return to work status.  - Making p

## 2021-08-18 ENCOUNTER — OFFICE VISIT (OUTPATIENT)
Dept: PHYSICAL THERAPY | Facility: HOSPITAL | Age: 31
End: 2021-08-18
Attending: PHYSICAL MEDICINE & REHABILITATION
Payer: OTHER MISCELLANEOUS

## 2021-08-18 PROCEDURE — 97110 THERAPEUTIC EXERCISES: CPT

## 2021-08-18 NOTE — PROGRESS NOTES
Dx: Lumbar sprain, initial encounter (S33.5XXA)            Authorized # of Visits:  11/12         Next MD visit: Dr. John Angela None Scheduled  Fall Risk: Standard      Precautions: N/A         Subjective:  Pt reports pain free status    See below    Date 7/21/ will demonstrate she is able to lift 30lbs from floor to waist with 2/10 pain or less to demonstrate progress towards return to work status. - Making progress towards LTG attainment  3.  Sadie Perkins will report she is sherie to stand for 30 mins with 2

## 2021-08-20 ENCOUNTER — APPOINTMENT (OUTPATIENT)
Dept: PHYSICAL THERAPY | Facility: HOSPITAL | Age: 31
End: 2021-08-20
Attending: PHYSICAL MEDICINE & REHABILITATION
Payer: OTHER MISCELLANEOUS

## 2021-08-20 ENCOUNTER — TELEPHONE (OUTPATIENT)
Dept: PHYSICAL THERAPY | Facility: HOSPITAL | Age: 31
End: 2021-08-20

## 2021-08-31 ENCOUNTER — OFFICE VISIT (OUTPATIENT)
Dept: NEUROLOGY | Facility: CLINIC | Age: 31
End: 2021-08-31
Payer: OTHER MISCELLANEOUS

## 2021-08-31 VITALS
SYSTOLIC BLOOD PRESSURE: 134 MMHG | HEIGHT: 64 IN | DIASTOLIC BLOOD PRESSURE: 66 MMHG | HEART RATE: 111 BPM | WEIGHT: 260 LBS | BODY MASS INDEX: 44.39 KG/M2 | OXYGEN SATURATION: 98 %

## 2021-08-31 DIAGNOSIS — S33.5XXA LUMBAR SPRAIN, INITIAL ENCOUNTER: Primary | ICD-10-CM

## 2021-08-31 DIAGNOSIS — M65.4 TENOSYNOVITIS, DE QUERVAIN: ICD-10-CM

## 2021-08-31 PROCEDURE — 3008F BODY MASS INDEX DOCD: CPT | Performed by: PHYSICAL MEDICINE & REHABILITATION

## 2021-08-31 PROCEDURE — 3078F DIAST BP <80 MM HG: CPT | Performed by: PHYSICAL MEDICINE & REHABILITATION

## 2021-08-31 PROCEDURE — 99213 OFFICE O/P EST LOW 20 MIN: CPT | Performed by: PHYSICAL MEDICINE & REHABILITATION

## 2021-08-31 PROCEDURE — 3075F SYST BP GE 130 - 139MM HG: CPT | Performed by: PHYSICAL MEDICINE & REHABILITATION

## 2021-08-31 NOTE — PROGRESS NOTES
[address]  Progress Note    CHIEF COMPLAINT:  Patient presents with:  Back Pain: LOV 7/30/21 Pt comes in for f/u back pain. Admits PT 90% improvement. Denies any meds.  Rates pain 0/10      History of Present trimester 11/2020    Hospitalized with severe low back pain in 1st trimester of pregnancy. Could not walk without morphine. Physical therapy.     • Migraine 06/26/2018   • Morbid obesity with BMI of 45.0-49.9, adult [facility] 11/23/2020    Pre-preg BMI 47.5 )         ALLERGIES:     Povidone Iodine         RASH    REVIEW OF SYSTEMS:   Patient-reported ROS  Constitutional  Sleep Disturbance: admits   Cardiovascular  Chest Pain: denies  Irregular Heartbeat: denies   Gastrointestinal  Bowel Incontinence: denies Return to Work Date: 9/6/2021   Follow-Up Date: 3 week       RTC    Return in about 3 weeks (around 9/21/2021). Discharge Instructions were provided as documented in AVS summary. The patient was in agreement with the assessment and plan.   All ques

## 2021-11-19 NOTE — TELEPHONE ENCOUNTER
Called patient to let her know request was sent to Dr. Leida Saenz for Dietitian order to see Bariatric Dietitian, [place]let Gamez for Pre-Surgical appt.  Encouraged patient to also follow up with Dr. Lokesh Gonzales office and then to call Bariatric dept to schedule appt once or Pt has a bed available at [facility] today.

## 2021-11-30 ENCOUNTER — OFFICE VISIT (OUTPATIENT)
Dept: INTERNAL MEDICINE CLINIC | Facility: CLINIC | Age: 31
End: 2021-11-30
Payer: COMMERCIAL

## 2021-11-30 VITALS
OXYGEN SATURATION: 97 % | SYSTOLIC BLOOD PRESSURE: 126 MMHG | BODY MASS INDEX: 49.82 KG/M2 | HEART RATE: 97 BPM | WEIGHT: 291.81 LBS | TEMPERATURE: 98 F | HEIGHT: 64 IN | DIASTOLIC BLOOD PRESSURE: 62 MMHG

## 2021-11-30 DIAGNOSIS — R73.9 HYPERGLYCEMIA: ICD-10-CM

## 2021-11-30 DIAGNOSIS — Z00.00 GENERAL MEDICAL EXAM: ICD-10-CM

## 2021-11-30 DIAGNOSIS — M65.9 TENOSYNOVITIS: ICD-10-CM

## 2021-11-30 DIAGNOSIS — L81.9 SKIN HYPOPIGMENTATION: Primary | ICD-10-CM

## 2021-11-30 DIAGNOSIS — E66.01 MORBID OBESITY (HCC): ICD-10-CM

## 2021-11-30 PROCEDURE — 99214 OFFICE O/P EST MOD 30 MIN: CPT | Performed by: INTERNAL MEDICINE

## 2021-11-30 PROCEDURE — 3008F BODY MASS INDEX DOCD: CPT | Performed by: INTERNAL MEDICINE

## 2021-11-30 PROCEDURE — 3078F DIAST BP <80 MM HG: CPT | Performed by: INTERNAL MEDICINE

## 2021-11-30 PROCEDURE — 3074F SYST BP LT 130 MM HG: CPT | Performed by: INTERNAL MEDICINE

## 2021-11-30 NOTE — PROGRESS NOTES
HPI:    Patient ID: Niru Johnson is a 33year old female. [facility] last seen postpartum after severe preeclampsia and gestational DM during 30 trimester. BP in normal since postpartum. She gained 40 pounds in 1 year.   Admits to noncompliant preeclampsia 2007    Delivered at 28 wk at [facility]       Past Surgical History:   Procedure Laterality Date   •   2007     at 28 wk for severe pre-eclampsia - Karissa    •   2014   •   2021    Repeat LTCS at 32w0d Comments: Morbidly obese   HENT:      Head: Normocephalic. Nose: Nose normal.   Eyes:      General: No scleral icterus. Extraocular Movements: Extraocular movements intact.       Conjunctiva/sclera: Conjunctivae normal.      Pupils: Pupils are [place] Reflex To Free T4      Hemoglobin A1C        Imaging & Referrals:  BARIATRICS - INTERNAL       All of the patient's questions asked were answered to the best of my ability and I do feel that the patient has a good understanding of the above discussion    T

## 2021-12-07 ENCOUNTER — OFFICE VISIT (OUTPATIENT)
Dept: SURGERY | Facility: CLINIC | Age: 31
End: 2021-12-07
Payer: COMMERCIAL

## 2021-12-07 VITALS
OXYGEN SATURATION: 97 % | DIASTOLIC BLOOD PRESSURE: 86 MMHG | HEART RATE: 85 BPM | WEIGHT: 291.38 LBS | BODY MASS INDEX: 49.75 KG/M2 | SYSTOLIC BLOOD PRESSURE: 131 MMHG | HEIGHT: 64 IN

## 2021-12-07 DIAGNOSIS — Z99.89 OSA ON CPAP: ICD-10-CM

## 2021-12-07 DIAGNOSIS — E66.01 MORBID OBESITY WITH BMI OF 45.0-49.9, ADULT (HCC): ICD-10-CM

## 2021-12-07 DIAGNOSIS — I10 ESSENTIAL HYPERTENSION: Primary | ICD-10-CM

## 2021-12-07 DIAGNOSIS — R63.2 INCREASED APPETITE: ICD-10-CM

## 2021-12-07 DIAGNOSIS — G47.33 OSA ON CPAP: ICD-10-CM

## 2021-12-07 DIAGNOSIS — E55.9 VITAMIN D DEFICIENCY: ICD-10-CM

## 2021-12-07 DIAGNOSIS — Z51.81 ENCOUNTER FOR THERAPEUTIC DRUG MONITORING: ICD-10-CM

## 2021-12-07 PROCEDURE — 3075F SYST BP GE 130 - 139MM HG: CPT | Performed by: INTERNAL MEDICINE

## 2021-12-07 PROCEDURE — 3079F DIAST BP 80-89 MM HG: CPT | Performed by: INTERNAL MEDICINE

## 2021-12-07 PROCEDURE — 99214 OFFICE O/P EST MOD 30 MIN: CPT | Performed by: INTERNAL MEDICINE

## 2021-12-07 PROCEDURE — 3008F BODY MASS INDEX DOCD: CPT | Performed by: INTERNAL MEDICINE

## 2021-12-07 RX ORDER — PHENTERMINE HYDROCHLORIDE 37.5 MG/1
37.5 TABLET ORAL
Qty: 30 TABLET | Refills: 2 | Status: SHIPPED | OUTPATIENT
Start: 2021-12-07 | End: 2022-01-06

## 2021-12-07 RX ORDER — HYDROCHLOROTHIAZIDE 12.5 MG/1
12.5 TABLET ORAL DAILY
Qty: 90 TABLET | Refills: 3 | Status: SHIPPED | OUTPATIENT
Start: 2021-12-07

## 2021-12-07 NOTE — PROGRESS NOTES
[address]  Dept: [phone]         Patient:  Leonila Dominique  :      1990  MRN:      [id number]    Chief Complaint:  Patient presen Sleep apnea-Improvement?  yes    OBJECTIVE     Vitals: /86   Pulse 85   Ht 5' 4\" (1.626 m)   Wt 291 lb 6.4 oz (132.2 kg)   LMP 11/14/2021   SpO2 97%   BMI 50.02 kg/m²     Initial weight loss:+21   Total weight loss:+50   Start weight: 241    Wt Read Health  Financial Resource Strain: Not on file  Food Insecurity: Not on file  Transportation Needs: Not on file  Physical Activity: Not on file  Stress: Not on file  Social Connections: Not on file  Intimate Partner Violence: Not on file  Housing Stability each meal    Exercise Goals Reviewed and Discussed    Walk for  30 Minutes    ROS:    Review of Systems   Constitutional: Negative. Negative for activity change, appetite change, chills and fatigue. HENT: Negative. Respiratory: Negative.   Negative fo beverages per day. No fruit juices or regular soda. 3. Increase activity-upper body exercises, walk 10 minutes per day.   4. Increase fruit and vegetable servings to 5-6 per day.       OBSTRUCTIVE SLEEP APNEA: The patient states her sleep apnea has been st

## 2021-12-09 ENCOUNTER — EKG ENCOUNTER (OUTPATIENT)
Dept: LAB | Facility: HOSPITAL | Age: 31
End: 2021-12-09
Attending: INTERNAL MEDICINE
Payer: COMMERCIAL

## 2021-12-09 DIAGNOSIS — R73.9 HYPERGLYCEMIA: ICD-10-CM

## 2021-12-09 DIAGNOSIS — E55.9 VITAMIN D DEFICIENCY: ICD-10-CM

## 2021-12-09 DIAGNOSIS — I10 ESSENTIAL HYPERTENSION: ICD-10-CM

## 2021-12-09 DIAGNOSIS — Z00.00 GENERAL MEDICAL EXAM: ICD-10-CM

## 2021-12-09 DIAGNOSIS — R63.2 INCREASED APPETITE: ICD-10-CM

## 2021-12-09 DIAGNOSIS — Z51.81 ENCOUNTER FOR THERAPEUTIC DRUG MONITORING: ICD-10-CM

## 2021-12-09 DIAGNOSIS — E55.9 VITAMIN D DEFICIENCY: Primary | ICD-10-CM

## 2021-12-09 DIAGNOSIS — Z99.89 OSA ON CPAP: ICD-10-CM

## 2021-12-09 DIAGNOSIS — G47.33 OSA ON CPAP: ICD-10-CM

## 2021-12-09 DIAGNOSIS — E66.01 MORBID OBESITY (HCC): ICD-10-CM

## 2021-12-09 DIAGNOSIS — E66.01 MORBID OBESITY WITH BMI OF 45.0-49.9, ADULT (HCC): ICD-10-CM

## 2021-12-09 PROCEDURE — 80053 COMPREHEN METABOLIC PANEL: CPT

## 2021-12-09 PROCEDURE — 82607 VITAMIN B-12: CPT

## 2021-12-09 PROCEDURE — 80061 LIPID PANEL: CPT

## 2021-12-09 PROCEDURE — 84443 ASSAY THYROID STIM HORMONE: CPT

## 2021-12-09 PROCEDURE — 36415 COLL VENOUS BLD VENIPUNCTURE: CPT

## 2021-12-09 PROCEDURE — 82306 VITAMIN D 25 HYDROXY: CPT

## 2021-12-09 PROCEDURE — 93010 ELECTROCARDIOGRAM REPORT: CPT | Performed by: INTERNAL MEDICINE

## 2021-12-09 PROCEDURE — 85025 COMPLETE CBC W/AUTO DIFF WBC: CPT

## 2021-12-09 PROCEDURE — 83036 HEMOGLOBIN GLYCOSYLATED A1C: CPT

## 2021-12-09 PROCEDURE — 93005 ELECTROCARDIOGRAM TRACING: CPT

## 2021-12-09 PROCEDURE — 82397 CHEMILUMINESCENT ASSAY: CPT

## 2021-12-09 RX ORDER — ERGOCALCIFEROL 1.25 MG/1
50000 CAPSULE ORAL WEEKLY
Qty: 12 CAPSULE | Refills: 0 | Status: SHIPPED | OUTPATIENT
Start: 2021-12-09 | End: 2022-01-10

## 2022-01-09 DIAGNOSIS — E55.9 VITAMIN D DEFICIENCY: ICD-10-CM

## 2022-01-10 RX ORDER — ERGOCALCIFEROL 1.25 MG/1
50000 CAPSULE ORAL WEEKLY
Qty: 12 CAPSULE | Refills: 0 | Status: SHIPPED | OUTPATIENT
Start: 2022-01-10 | End: 2022-03-29

## 2022-03-09 ENCOUNTER — OFFICE VISIT (OUTPATIENT)
Dept: SURGERY | Facility: CLINIC | Age: 32
End: 2022-03-09
Payer: COMMERCIAL

## 2022-03-09 VITALS
HEART RATE: 91 BPM | DIASTOLIC BLOOD PRESSURE: 79 MMHG | BODY MASS INDEX: 45.52 KG/M2 | WEIGHT: 266.63 LBS | OXYGEN SATURATION: 98 % | HEIGHT: 64 IN | SYSTOLIC BLOOD PRESSURE: 127 MMHG

## 2022-03-09 DIAGNOSIS — G47.33 OSA ON CPAP: ICD-10-CM

## 2022-03-09 DIAGNOSIS — Z99.89 OSA ON CPAP: ICD-10-CM

## 2022-03-09 DIAGNOSIS — I10 ESSENTIAL HYPERTENSION: Primary | ICD-10-CM

## 2022-03-09 DIAGNOSIS — Z51.81 ENCOUNTER FOR THERAPEUTIC DRUG MONITORING: ICD-10-CM

## 2022-03-09 DIAGNOSIS — E66.01 MORBID OBESITY WITH BMI OF 45.0-49.9, ADULT (HCC): ICD-10-CM

## 2022-03-09 DIAGNOSIS — E55.9 VITAMIN D DEFICIENCY: ICD-10-CM

## 2022-03-09 PROCEDURE — 3078F DIAST BP <80 MM HG: CPT | Performed by: INTERNAL MEDICINE

## 2022-03-09 PROCEDURE — 3074F SYST BP LT 130 MM HG: CPT | Performed by: INTERNAL MEDICINE

## 2022-03-09 PROCEDURE — 99214 OFFICE O/P EST MOD 30 MIN: CPT | Performed by: INTERNAL MEDICINE

## 2022-03-09 PROCEDURE — 3008F BODY MASS INDEX DOCD: CPT | Performed by: INTERNAL MEDICINE

## 2022-03-09 RX ORDER — PHENTERMINE HYDROCHLORIDE 37.5 MG/1
37.5 TABLET ORAL
Qty: 30 TABLET | Refills: 2 | Status: SHIPPED | OUTPATIENT
Start: 2022-03-09 | End: 2022-04-08

## 2022-05-04 ENCOUNTER — OFFICE VISIT (OUTPATIENT)
Dept: FAMILY MEDICINE CLINIC | Facility: CLINIC | Age: 32
End: 2022-05-04
Payer: COMMERCIAL

## 2022-05-04 VITALS
DIASTOLIC BLOOD PRESSURE: 85 MMHG | HEIGHT: 64 IN | HEART RATE: 96 BPM | SYSTOLIC BLOOD PRESSURE: 129 MMHG | OXYGEN SATURATION: 99 % | WEIGHT: 264.19 LBS | BODY MASS INDEX: 45.1 KG/M2

## 2022-05-04 DIAGNOSIS — Z99.89 OSA ON CPAP: ICD-10-CM

## 2022-05-04 DIAGNOSIS — L73.2 HIDRADENITIS SUPPURATIVA: Primary | ICD-10-CM

## 2022-05-04 DIAGNOSIS — G43.C0 PERIODIC HEADACHE SYNDROME, NOT INTRACTABLE: ICD-10-CM

## 2022-05-04 DIAGNOSIS — E66.01 MORBID OBESITY WITH BMI OF 45.0-49.9, ADULT (HCC): ICD-10-CM

## 2022-05-04 DIAGNOSIS — G47.33 OSA ON CPAP: ICD-10-CM

## 2022-05-04 PROCEDURE — 3079F DIAST BP 80-89 MM HG: CPT | Performed by: STUDENT IN AN ORGANIZED HEALTH CARE EDUCATION/TRAINING PROGRAM

## 2022-05-04 PROCEDURE — 99204 OFFICE O/P NEW MOD 45 MIN: CPT | Performed by: STUDENT IN AN ORGANIZED HEALTH CARE EDUCATION/TRAINING PROGRAM

## 2022-05-04 PROCEDURE — 3008F BODY MASS INDEX DOCD: CPT | Performed by: STUDENT IN AN ORGANIZED HEALTH CARE EDUCATION/TRAINING PROGRAM

## 2022-05-04 PROCEDURE — 3074F SYST BP LT 130 MM HG: CPT | Performed by: STUDENT IN AN ORGANIZED HEALTH CARE EDUCATION/TRAINING PROGRAM

## 2022-05-04 RX ORDER — PHENTERMINE HYDROCHLORIDE 37.5 MG/1
37.5 TABLET ORAL
COMMUNITY
Start: 2022-04-20

## 2022-05-05 ENCOUNTER — TELEPHONE (OUTPATIENT)
Dept: FAMILY MEDICINE CLINIC | Facility: CLINIC | Age: 32
End: 2022-05-05

## 2022-05-05 ENCOUNTER — MED REC SCAN ONLY (OUTPATIENT)
Dept: FAMILY MEDICINE CLINIC | Facility: CLINIC | Age: 32
End: 2022-05-05

## 2022-05-09 RX ORDER — ERGOCALCIFEROL 1.25 MG/1
50000 CAPSULE ORAL WEEKLY
Qty: 12 CAPSULE | Refills: 0 | Status: SHIPPED | OUTPATIENT
Start: 2022-05-09 | End: 2022-07-26

## 2022-06-15 ENCOUNTER — APPOINTMENT (OUTPATIENT)
Dept: URBAN - METROPOLITAN AREA CLINIC 244 | Age: 32
Setting detail: DERMATOLOGY
End: 2022-06-17

## 2022-06-15 DIAGNOSIS — L73.2 HIDRADENITIS SUPPURATIVA: ICD-10-CM

## 2022-06-15 DIAGNOSIS — L20.89 OTHER ATOPIC DERMATITIS: ICD-10-CM

## 2022-06-15 PROBLEM — L20.84 INTRINSIC (ALLERGIC) ECZEMA: Status: ACTIVE | Noted: 2022-06-15

## 2022-06-15 PROCEDURE — 99204 OFFICE O/P NEW MOD 45 MIN: CPT

## 2022-06-15 PROCEDURE — OTHER COUNSELING: OTHER

## 2022-06-15 PROCEDURE — OTHER PRESCRIPTION: OTHER

## 2022-06-15 PROCEDURE — OTHER OTC TREATMENT REGIMEN: OTHER

## 2022-06-15 RX ORDER — MINOCYCLINE HYDROCHLORIDE 100 MG/1
CAPSULE ORAL
Qty: 60 | Refills: 0 | Status: ERX | COMMUNITY
Start: 2022-06-15

## 2022-06-15 RX ORDER — TACROLIMUS 1 MG/G
OINTMENT TOPICAL
Qty: 30 | Refills: 0 | Status: ERX | COMMUNITY
Start: 2022-06-15

## 2022-06-15 RX ORDER — CLINDAMYCIN PHOSPHATE 10 MG/G
GEL TOPICAL
Qty: 60 | Refills: 3 | Status: ERX | COMMUNITY
Start: 2022-06-15

## 2022-06-15 RX ORDER — CHLORHEXIDINE GLUCONATE 213 G/1000ML
SOLUTION TOPICAL
Qty: 118 | Refills: 7 | Status: ERX | COMMUNITY
Start: 2022-06-15

## 2022-06-15 ASSESSMENT — LOCATION SIMPLE DESCRIPTION DERM
LOCATION SIMPLE: GROIN
LOCATION SIMPLE: ABDOMEN
LOCATION SIMPLE: LEFT THIGH
LOCATION SIMPLE: LEFT AXILLARY VAULT
LOCATION SIMPLE: RIGHT AXILLARY VAULT

## 2022-06-15 ASSESSMENT — LOCATION ZONE DERM
LOCATION ZONE: VULVA
LOCATION ZONE: AXILLAE
LOCATION ZONE: TRUNK
LOCATION ZONE: LEG

## 2022-06-15 ASSESSMENT — LOCATION DETAILED DESCRIPTION DERM
LOCATION DETAILED: MONS PUBIS
LOCATION DETAILED: LEFT AXILLARY VAULT
LOCATION DETAILED: RIGHT AXILLARY VAULT
LOCATION DETAILED: LEFT ANTERIOR PROXIMAL THIGH
LOCATION DETAILED: XIPHOID

## 2022-06-15 NOTE — PROCEDURE: OTC TREATMENT REGIMEN
Detail Level: Zone
Patient Specific Otc Recommendations (Will Not Stick From Patient To Patient): Hibiclens Wash

## 2022-06-22 ENCOUNTER — MED REC SCAN ONLY (OUTPATIENT)
Dept: FAMILY MEDICINE CLINIC | Facility: CLINIC | Age: 32
End: 2022-06-22

## 2022-06-23 ENCOUNTER — OFFICE VISIT (OUTPATIENT)
Dept: SURGERY | Facility: CLINIC | Age: 32
End: 2022-06-23
Payer: COMMERCIAL

## 2022-06-23 VITALS
SYSTOLIC BLOOD PRESSURE: 124 MMHG | OXYGEN SATURATION: 97 % | WEIGHT: 263 LBS | HEART RATE: 88 BPM | BODY MASS INDEX: 44.9 KG/M2 | DIASTOLIC BLOOD PRESSURE: 81 MMHG | HEIGHT: 64 IN

## 2022-06-23 DIAGNOSIS — R63.2 INCREASED APPETITE: ICD-10-CM

## 2022-06-23 DIAGNOSIS — Z51.81 ENCOUNTER FOR THERAPEUTIC DRUG MONITORING: ICD-10-CM

## 2022-06-23 DIAGNOSIS — Z99.89 OSA ON CPAP: ICD-10-CM

## 2022-06-23 DIAGNOSIS — E66.01 MORBID OBESITY WITH BMI OF 45.0-49.9, ADULT (HCC): ICD-10-CM

## 2022-06-23 DIAGNOSIS — G47.33 OSA ON CPAP: ICD-10-CM

## 2022-06-23 DIAGNOSIS — I10 ESSENTIAL HYPERTENSION: Primary | ICD-10-CM

## 2022-06-23 PROCEDURE — 3008F BODY MASS INDEX DOCD: CPT | Performed by: INTERNAL MEDICINE

## 2022-06-23 PROCEDURE — 99214 OFFICE O/P EST MOD 30 MIN: CPT | Performed by: INTERNAL MEDICINE

## 2022-06-23 PROCEDURE — 3074F SYST BP LT 130 MM HG: CPT | Performed by: INTERNAL MEDICINE

## 2022-06-23 PROCEDURE — 3079F DIAST BP 80-89 MM HG: CPT | Performed by: INTERNAL MEDICINE

## 2022-06-23 RX ORDER — PHENTERMINE HYDROCHLORIDE 37.5 MG/1
37.5 TABLET ORAL
Qty: 30 TABLET | Refills: 2 | Status: SHIPPED | OUTPATIENT
Start: 2022-06-23 | End: 2022-07-23

## 2022-07-09 ENCOUNTER — HOSPITAL ENCOUNTER (OUTPATIENT)
Age: 32
Discharge: HOME | End: 2022-07-09
Payer: COMMERCIAL

## 2022-07-09 VITALS
SYSTOLIC BLOOD PRESSURE: 134 MMHG | HEART RATE: 84 BPM | DIASTOLIC BLOOD PRESSURE: 87 MMHG | OXYGEN SATURATION: 99 % | TEMPERATURE: 98 F | RESPIRATION RATE: 18 BRPM

## 2022-07-09 DIAGNOSIS — U07.1 COVID: Primary | ICD-10-CM

## 2022-07-09 LAB — SARS-COV-2 RNA RESP QL NAA+PROBE: DETECTED

## 2022-07-09 PROCEDURE — 99213 OFFICE O/P EST LOW 20 MIN: CPT

## 2022-07-27 ENCOUNTER — OFFICE VISIT (OUTPATIENT)
Dept: SURGERY | Facility: CLINIC | Age: 32
End: 2022-07-27
Payer: COMMERCIAL

## 2022-07-27 ENCOUNTER — DOCUMENTATION ONLY (OUTPATIENT)
Dept: SURGERY | Facility: CLINIC | Age: 32
End: 2022-07-27

## 2022-07-27 VITALS
SYSTOLIC BLOOD PRESSURE: 128 MMHG | WEIGHT: 265.88 LBS | DIASTOLIC BLOOD PRESSURE: 80 MMHG | BODY MASS INDEX: 45.39 KG/M2 | HEIGHT: 64 IN | OXYGEN SATURATION: 98 % | HEART RATE: 101 BPM

## 2022-07-27 DIAGNOSIS — Z99.89 OSA ON CPAP: Primary | ICD-10-CM

## 2022-07-27 DIAGNOSIS — G47.33 OSA ON CPAP: Primary | ICD-10-CM

## 2022-07-27 DIAGNOSIS — E55.9 VITAMIN D DEFICIENCY: ICD-10-CM

## 2022-07-27 DIAGNOSIS — Z51.81 ENCOUNTER FOR THERAPEUTIC DRUG MONITORING: ICD-10-CM

## 2022-07-27 DIAGNOSIS — I10 ESSENTIAL HYPERTENSION: ICD-10-CM

## 2022-07-27 DIAGNOSIS — E66.01 MORBID OBESITY WITH BMI OF 45.0-49.9, ADULT (HCC): ICD-10-CM

## 2022-07-27 NOTE — PROGRESS NOTES
Oriented pt to the bariatric program; provided/reviewed bariatric packet of info, time line, referrals, etc; pt agreed and verbalized understanding; pt is restarting the program; attended seminar on 5/16/22.

## 2022-08-17 ENCOUNTER — LAB ENCOUNTER (OUTPATIENT)
Dept: LAB | Facility: HOSPITAL | Age: 32
End: 2022-08-17
Attending: SINGLE SPECIALTY
Payer: COMMERCIAL

## 2022-08-17 DIAGNOSIS — I10 ESSENTIAL HYPERTENSION: ICD-10-CM

## 2022-08-17 DIAGNOSIS — E66.01 MORBID OBESITY WITH BMI OF 45.0-49.9, ADULT (HCC): ICD-10-CM

## 2022-08-17 DIAGNOSIS — Z51.81 ENCOUNTER FOR THERAPEUTIC DRUG MONITORING: ICD-10-CM

## 2022-08-17 DIAGNOSIS — Z99.89 OSA ON CPAP: ICD-10-CM

## 2022-08-17 DIAGNOSIS — E55.9 VITAMIN D DEFICIENCY: ICD-10-CM

## 2022-08-17 DIAGNOSIS — G47.33 OSA ON CPAP: ICD-10-CM

## 2022-08-17 LAB
ALBUMIN SERPL-MCNC: 3.5 G/DL (ref 3.4–5)
ALBUMIN/GLOB SERPL: 0.8 {RATIO} (ref 1–2)
ALP LIVER SERPL-CCNC: 79 U/L
ALT SERPL-CCNC: 20 U/L
ANION GAP SERPL CALC-SCNC: 4 MMOL/L (ref 0–18)
AST SERPL-CCNC: 15 U/L (ref 15–37)
BASOPHILS # BLD AUTO: 0.03 X10(3) UL (ref 0–0.2)
BASOPHILS NFR BLD AUTO: 0.5 %
BILIRUB SERPL-MCNC: 0.5 MG/DL (ref 0.1–2)
BUN BLD-MCNC: 12 MG/DL (ref 7–18)
BUN/CREAT SERPL: 15.4 (ref 10–20)
CALCIUM BLD-MCNC: 9.3 MG/DL (ref 8.5–10.1)
CHLORIDE SERPL-SCNC: 107 MMOL/L (ref 98–112)
CHOLEST SERPL-MCNC: 172 MG/DL (ref ?–200)
CO2 SERPL-SCNC: 29 MMOL/L (ref 21–32)
CREAT BLD-MCNC: 0.78 MG/DL
DEPRECATED HBV CORE AB SER IA-ACNC: 114.6 NG/ML
DEPRECATED RDW RBC AUTO: 43.4 FL (ref 35.1–46.3)
EOSINOPHIL # BLD AUTO: 0.16 X10(3) UL (ref 0–0.7)
EOSINOPHIL NFR BLD AUTO: 2.5 %
ERYTHROCYTE [DISTWIDTH] IN BLOOD BY AUTOMATED COUNT: 13.9 % (ref 11–15)
EST. AVERAGE GLUCOSE BLD GHB EST-MCNC: 111 MG/DL (ref 68–126)
FASTING PATIENT LIPID ANSWER: YES
FASTING STATUS PATIENT QL REPORTED: YES
FOLATE SERPL-MCNC: 9.6 NG/ML (ref 8.7–?)
GFR SERPLBLD BASED ON 1.73 SQ M-ARVRAT: 102 ML/MIN/1.73M2 (ref 60–?)
GLOBULIN PLAS-MCNC: 4.3 G/DL (ref 2.8–4.4)
GLUCOSE BLD-MCNC: 91 MG/DL (ref 70–99)
HBA1C MFR BLD: 5.5 % (ref ?–5.7)
HCT VFR BLD AUTO: 42.7 %
HDLC SERPL-MCNC: 51 MG/DL (ref 40–59)
HGB BLD-MCNC: 13.3 G/DL
IMM GRANULOCYTES # BLD AUTO: 0.01 X10(3) UL (ref 0–1)
IMM GRANULOCYTES NFR BLD: 0.2 %
IRON SATN MFR SERPL: 14 %
IRON SERPL-MCNC: 48 UG/DL
LDLC SERPL CALC-MCNC: 97 MG/DL (ref ?–100)
LYMPHOCYTES # BLD AUTO: 2.01 X10(3) UL (ref 1–4)
LYMPHOCYTES NFR BLD AUTO: 31.8 %
MCH RBC QN AUTO: 26.9 PG (ref 26–34)
MCHC RBC AUTO-ENTMCNC: 31.1 G/DL (ref 31–37)
MCV RBC AUTO: 86.4 FL
MONOCYTES # BLD AUTO: 0.46 X10(3) UL (ref 0.1–1)
MONOCYTES NFR BLD AUTO: 7.3 %
NEUTROPHILS # BLD AUTO: 3.65 X10 (3) UL (ref 1.5–7.7)
NEUTROPHILS # BLD AUTO: 3.65 X10(3) UL (ref 1.5–7.7)
NEUTROPHILS NFR BLD AUTO: 57.7 %
NONHDLC SERPL-MCNC: 121 MG/DL (ref ?–130)
OSMOLALITY SERPL CALC.SUM OF ELEC: 289 MOSM/KG (ref 275–295)
PLATELET # BLD AUTO: 221 10(3)UL (ref 150–450)
POTASSIUM SERPL-SCNC: 3.9 MMOL/L (ref 3.5–5.1)
PROT SERPL-MCNC: 7.8 G/DL (ref 6.4–8.2)
RBC # BLD AUTO: 4.94 X10(6)UL
SODIUM SERPL-SCNC: 140 MMOL/L (ref 136–145)
TIBC SERPL-MCNC: 340 UG/DL (ref 240–450)
TRANSFERRIN SERPL-MCNC: 228 MG/DL (ref 200–360)
TRIGL SERPL-MCNC: 134 MG/DL (ref 30–149)
TSI SER-ACNC: 1.02 MIU/ML (ref 0.36–3.74)
VIT B12 SERPL-MCNC: 785 PG/ML (ref 193–986)
VIT D+METAB SERPL-MCNC: 30.4 NG/ML (ref 30–100)
VLDLC SERPL CALC-MCNC: 22 MG/DL (ref 0–30)
WBC # BLD AUTO: 6.3 X10(3) UL (ref 4–11)

## 2022-08-17 PROCEDURE — 85025 COMPLETE CBC W/AUTO DIFF WBC: CPT

## 2022-08-17 PROCEDURE — 80053 COMPREHEN METABOLIC PANEL: CPT

## 2022-08-17 PROCEDURE — 84443 ASSAY THYROID STIM HORMONE: CPT

## 2022-08-17 PROCEDURE — 83036 HEMOGLOBIN GLYCOSYLATED A1C: CPT

## 2022-08-17 PROCEDURE — 36415 COLL VENOUS BLD VENIPUNCTURE: CPT

## 2022-08-17 PROCEDURE — 82728 ASSAY OF FERRITIN: CPT

## 2022-08-17 PROCEDURE — 82306 VITAMIN D 25 HYDROXY: CPT

## 2022-08-17 PROCEDURE — 83540 ASSAY OF IRON: CPT

## 2022-08-17 PROCEDURE — 84425 ASSAY OF VITAMIN B-1: CPT

## 2022-08-17 PROCEDURE — 82746 ASSAY OF FOLIC ACID SERUM: CPT

## 2022-08-17 PROCEDURE — 84466 ASSAY OF TRANSFERRIN: CPT

## 2022-08-17 PROCEDURE — 82607 VITAMIN B-12: CPT

## 2022-08-17 PROCEDURE — 80061 LIPID PANEL: CPT

## 2022-08-21 LAB — VITAMIN B1 (THIAMINE), WHOLE B: 109 NMOL/L

## 2022-09-05 ENCOUNTER — PATIENT MESSAGE (OUTPATIENT)
Dept: FAMILY MEDICINE CLINIC | Facility: CLINIC | Age: 32
End: 2022-09-05

## 2022-09-06 NOTE — TELEPHONE ENCOUNTER
From: Susan Pham  To: Joycelyn Scott MD  Sent: 9/5/2022 8:08 PM CDT  Subject: Questions about test results and fmla    I have been hoping that an earlier appointment than October would have opened up but I really need a visit with you concerning test results and fmla and other medical questions. If I could get a earlier day to get a visit with you I would take anything.  Please help me with my request

## 2022-09-07 ENCOUNTER — OFFICE VISIT (OUTPATIENT)
Dept: FAMILY MEDICINE CLINIC | Facility: CLINIC | Age: 32
End: 2022-09-07
Payer: COMMERCIAL

## 2022-09-07 VITALS
HEIGHT: 64 IN | WEIGHT: 272 LBS | SYSTOLIC BLOOD PRESSURE: 127 MMHG | BODY MASS INDEX: 46.44 KG/M2 | DIASTOLIC BLOOD PRESSURE: 77 MMHG | HEART RATE: 101 BPM

## 2022-09-07 DIAGNOSIS — G43.C0 PERIODIC HEADACHE SYNDROME, NOT INTRACTABLE: Primary | ICD-10-CM

## 2022-09-07 DIAGNOSIS — E66.01 MORBID OBESITY WITH BMI OF 45.0-49.9, ADULT (HCC): ICD-10-CM

## 2022-09-07 PROCEDURE — 3078F DIAST BP <80 MM HG: CPT

## 2022-09-07 PROCEDURE — 3074F SYST BP LT 130 MM HG: CPT

## 2022-09-07 PROCEDURE — 3008F BODY MASS INDEX DOCD: CPT

## 2022-09-07 PROCEDURE — 99213 OFFICE O/P EST LOW 20 MIN: CPT

## 2022-09-07 RX ORDER — AZITHROMYCIN 250 MG/1
TABLET, FILM COATED ORAL
COMMUNITY
Start: 2022-08-01

## 2022-09-07 RX ORDER — TACROLIMUS 1 MG/G
1 OINTMENT TOPICAL 2 TIMES DAILY
COMMUNITY
Start: 2022-06-15

## 2022-09-07 RX ORDER — CLINDAMYCIN PHOSPHATE 10 MG/G
GEL TOPICAL
COMMUNITY
Start: 2022-08-28

## 2022-09-09 ENCOUNTER — PATIENT MESSAGE (OUTPATIENT)
Dept: OBGYN CLINIC | Facility: CLINIC | Age: 32
End: 2022-09-09

## 2022-09-10 RX ORDER — ACYCLOVIR 50 MG/G
1 OINTMENT TOPICAL
Qty: 30 G | Refills: 0 | Status: SHIPPED | OUTPATIENT
Start: 2022-09-10

## 2022-09-10 NOTE — TELEPHONE ENCOUNTER
Last visit: PP in May, 2021 with MALCOLM Recs to f/u in Nov for annual. Pt has appt with [address] on 9/14.

## 2022-09-10 NOTE — TELEPHONE ENCOUNTER
YOSELINN on call notified and approves acyclovir ointment rx for 1 tube. Rx sent to pharmacy. Pt notifed.

## 2022-09-14 ENCOUNTER — OFFICE VISIT (OUTPATIENT)
Dept: OBGYN CLINIC | Facility: CLINIC | Age: 32
End: 2022-09-14
Payer: COMMERCIAL

## 2022-09-14 ENCOUNTER — LAB ENCOUNTER (OUTPATIENT)
Dept: LAB | Facility: HOSPITAL | Age: 32
End: 2022-09-14
Attending: NURSE PRACTITIONER
Payer: COMMERCIAL

## 2022-09-14 VITALS
SYSTOLIC BLOOD PRESSURE: 125 MMHG | BODY MASS INDEX: 46.13 KG/M2 | HEART RATE: 66 BPM | HEIGHT: 64 IN | WEIGHT: 270.19 LBS | DIASTOLIC BLOOD PRESSURE: 87 MMHG

## 2022-09-14 DIAGNOSIS — Z11.3 ROUTINE SCREENING FOR STI (SEXUALLY TRANSMITTED INFECTION): ICD-10-CM

## 2022-09-14 DIAGNOSIS — Z30.09 GENERAL COUNSELING AND ADVICE FOR CONTRACEPTIVE MANAGEMENT: ICD-10-CM

## 2022-09-14 DIAGNOSIS — N89.8 VAGINAL ITCHING: ICD-10-CM

## 2022-09-14 DIAGNOSIS — Z01.419 WELL WOMAN EXAM WITH ROUTINE GYNECOLOGICAL EXAM: Primary | ICD-10-CM

## 2022-09-14 DIAGNOSIS — Z12.4 SCREENING FOR CERVICAL CANCER: ICD-10-CM

## 2022-09-14 LAB
HBV SURFACE AG SER-ACNC: 0.71 [IU]/L
HBV SURFACE AG SERPL QL IA: NONREACTIVE
HCV AB SERPL QL IA: NONREACTIVE

## 2022-09-14 PROCEDURE — 99395 PREV VISIT EST AGE 18-39: CPT | Performed by: NURSE PRACTITIONER

## 2022-09-14 PROCEDURE — 3079F DIAST BP 80-89 MM HG: CPT | Performed by: NURSE PRACTITIONER

## 2022-09-14 PROCEDURE — 86780 TREPONEMA PALLIDUM: CPT

## 2022-09-14 PROCEDURE — 87389 HIV-1 AG W/HIV-1&-2 AB AG IA: CPT

## 2022-09-14 PROCEDURE — 86803 HEPATITIS C AB TEST: CPT

## 2022-09-14 PROCEDURE — 87340 HEPATITIS B SURFACE AG IA: CPT

## 2022-09-14 PROCEDURE — 36415 COLL VENOUS BLD VENIPUNCTURE: CPT

## 2022-09-14 PROCEDURE — 3074F SYST BP LT 130 MM HG: CPT | Performed by: NURSE PRACTITIONER

## 2022-09-14 PROCEDURE — 3008F BODY MASS INDEX DOCD: CPT | Performed by: NURSE PRACTITIONER

## 2022-09-14 RX ORDER — PHENTERMINE HYDROCHLORIDE 37.5 MG/1
37.5 CAPSULE ORAL EVERY MORNING
COMMUNITY

## 2022-09-15 LAB
C TRACH DNA SPEC QL NAA+PROBE: NEGATIVE
HPV I/H RISK 1 DNA SPEC QL NAA+PROBE: NEGATIVE
N GONORRHOEA DNA SPEC QL NAA+PROBE: NEGATIVE

## 2022-09-16 LAB — T PALLIDUM AB SER QL: NEGATIVE

## 2022-09-21 ENCOUNTER — OFFICE VISIT (OUTPATIENT)
Dept: SURGERY | Facility: CLINIC | Age: 32
End: 2022-09-21

## 2022-09-21 VITALS
HEART RATE: 83 BPM | WEIGHT: 268.88 LBS | BODY MASS INDEX: 45.9 KG/M2 | SYSTOLIC BLOOD PRESSURE: 118 MMHG | HEIGHT: 64 IN | OXYGEN SATURATION: 97 % | DIASTOLIC BLOOD PRESSURE: 82 MMHG

## 2022-09-21 DIAGNOSIS — Z99.89 OSA ON CPAP: Primary | ICD-10-CM

## 2022-09-21 DIAGNOSIS — G47.33 OSA ON CPAP: Primary | ICD-10-CM

## 2022-09-21 DIAGNOSIS — E66.01 MORBID OBESITY WITH BMI OF 45.0-49.9, ADULT (HCC): ICD-10-CM

## 2022-09-22 ENCOUNTER — OFFICE VISIT (OUTPATIENT)
Dept: SURGERY | Facility: CLINIC | Age: 32
End: 2022-09-22

## 2022-09-22 VITALS
HEIGHT: 64 IN | OXYGEN SATURATION: 98 % | DIASTOLIC BLOOD PRESSURE: 79 MMHG | BODY MASS INDEX: 45.55 KG/M2 | SYSTOLIC BLOOD PRESSURE: 138 MMHG | WEIGHT: 266.81 LBS | HEART RATE: 82 BPM

## 2022-09-22 DIAGNOSIS — R63.2 INCREASED APPETITE: ICD-10-CM

## 2022-09-22 DIAGNOSIS — E66.01 MORBID OBESITY WITH BMI OF 45.0-49.9, ADULT (HCC): ICD-10-CM

## 2022-09-22 DIAGNOSIS — G47.33 OSA ON CPAP: ICD-10-CM

## 2022-09-22 DIAGNOSIS — Z99.89 OSA ON CPAP: ICD-10-CM

## 2022-09-22 DIAGNOSIS — I10 ESSENTIAL HYPERTENSION: Primary | ICD-10-CM

## 2022-09-22 DIAGNOSIS — Z51.81 ENCOUNTER FOR THERAPEUTIC DRUG MONITORING: ICD-10-CM

## 2022-09-22 PROCEDURE — 99214 OFFICE O/P EST MOD 30 MIN: CPT | Performed by: INTERNAL MEDICINE

## 2022-09-22 PROCEDURE — 3078F DIAST BP <80 MM HG: CPT | Performed by: INTERNAL MEDICINE

## 2022-09-22 PROCEDURE — 3075F SYST BP GE 130 - 139MM HG: CPT | Performed by: INTERNAL MEDICINE

## 2022-09-22 PROCEDURE — 3008F BODY MASS INDEX DOCD: CPT | Performed by: INTERNAL MEDICINE

## 2022-09-22 RX ORDER — HYDROCHLOROTHIAZIDE 12.5 MG/1
12.5 TABLET ORAL DAILY
Qty: 90 TABLET | Refills: 3 | Status: SHIPPED | OUTPATIENT
Start: 2022-09-22

## 2022-09-22 RX ORDER — PHENTERMINE HYDROCHLORIDE 37.5 MG/1
37.5 CAPSULE ORAL EVERY MORNING
Qty: 30 CAPSULE | Refills: 1 | Status: SHIPPED | OUTPATIENT
Start: 2022-09-22

## 2022-09-28 ENCOUNTER — OFFICE VISIT (OUTPATIENT)
Dept: SURGERY | Facility: CLINIC | Age: 32
End: 2022-09-28

## 2022-09-28 VITALS — BODY MASS INDEX: 46.3 KG/M2 | WEIGHT: 271.19 LBS | HEIGHT: 64 IN

## 2022-09-28 DIAGNOSIS — I10 ESSENTIAL HYPERTENSION: ICD-10-CM

## 2022-09-28 DIAGNOSIS — G47.33 OSA ON CPAP: ICD-10-CM

## 2022-09-28 DIAGNOSIS — E55.9 VITAMIN D DEFICIENCY: ICD-10-CM

## 2022-09-28 DIAGNOSIS — R63.2 INCREASED APPETITE: ICD-10-CM

## 2022-09-28 DIAGNOSIS — Z99.89 OSA ON CPAP: ICD-10-CM

## 2022-09-28 DIAGNOSIS — E66.01 MORBID OBESITY WITH BMI OF 45.0-49.9, ADULT (HCC): ICD-10-CM

## 2022-09-28 PROCEDURE — 3008F BODY MASS INDEX DOCD: CPT

## 2022-09-28 PROCEDURE — 97802 MEDICAL NUTRITION INDIV IN: CPT

## 2022-09-28 NOTE — PATIENT INSTRUCTIONS
Goals: 1. Keep a food record, My Net Diary, select the macros dashboard or My Fitness Pal.   2. Strive to consume at least 4-6 meals/snacks per day. Aim for 44-55 grams of protein per day. Try to keep the carbohydrates at 120 grams per day or less. 3. Practice eating strategies, eat separately from drinking, avoid straws, chew food 20-30 times before swallowing. Make the meals last 30 minutes. 4. Aim for 64 oz per day of water. (Try Protein water, adding True Lemon, Crystal Light). 5. Taper caffeine and carbonation. 6. Exercise and strength train with a goal of 30 minutes per day for exercise (for example, walking). Strength training 10 minutes 3 days per week.

## 2022-09-30 ENCOUNTER — DOCUMENTATION ONLY (OUTPATIENT)
Dept: SURGERY | Facility: CLINIC | Age: 32
End: 2022-09-30

## 2022-09-30 NOTE — PROGRESS NOTES
Please review the information regarding my recent evaluation of our mutual patient, Gerhardt Aid, 2/11/1990.       Surgical Clearance from [facility]  Evaluation Completed on: 09/30/22  Clearance information for: Bariatric  Referring Provider: Alexsandra Benitez MD  Clearance for Bariatric Surgery: Approved  Evaluation Concerns: No concerns  Recommendations: Patient to continue to meet with dietician to refine diet and monitor progress      Thank you for allowing me to partake in the care of this patient,     Sincerely,     Viri Miller PsyD  9/30/2022 @ 1:21 PM

## 2022-10-10 ENCOUNTER — PATIENT MESSAGE (OUTPATIENT)
Dept: INTERNAL MEDICINE CLINIC | Facility: CLINIC | Age: 32
End: 2022-10-10

## 2022-10-19 ENCOUNTER — OFFICE VISIT (OUTPATIENT)
Dept: PULMONOLOGY | Facility: CLINIC | Age: 32
End: 2022-10-19
Payer: COMMERCIAL

## 2022-10-19 ENCOUNTER — MED REC SCAN ONLY (OUTPATIENT)
Dept: FAMILY MEDICINE CLINIC | Facility: CLINIC | Age: 32
End: 2022-10-19

## 2022-10-19 ENCOUNTER — OFFICE VISIT (OUTPATIENT)
Dept: FAMILY MEDICINE CLINIC | Facility: CLINIC | Age: 32
End: 2022-10-19
Payer: COMMERCIAL

## 2022-10-19 ENCOUNTER — OFFICE VISIT (OUTPATIENT)
Dept: SURGERY | Facility: CLINIC | Age: 32
End: 2022-10-19
Payer: COMMERCIAL

## 2022-10-19 ENCOUNTER — TELEPHONE (OUTPATIENT)
Dept: FAMILY MEDICINE CLINIC | Facility: CLINIC | Age: 32
End: 2022-10-19

## 2022-10-19 VITALS
HEART RATE: 99 BPM | SYSTOLIC BLOOD PRESSURE: 124 MMHG | OXYGEN SATURATION: 98 % | WEIGHT: 269.81 LBS | HEIGHT: 64 IN | BODY MASS INDEX: 46.06 KG/M2 | DIASTOLIC BLOOD PRESSURE: 84 MMHG

## 2022-10-19 VITALS
BODY MASS INDEX: 45.58 KG/M2 | SYSTOLIC BLOOD PRESSURE: 134 MMHG | RESPIRATION RATE: 16 BRPM | WEIGHT: 267 LBS | DIASTOLIC BLOOD PRESSURE: 85 MMHG | HEART RATE: 98 BPM | HEIGHT: 64 IN

## 2022-10-19 VITALS
BODY MASS INDEX: 45.58 KG/M2 | SYSTOLIC BLOOD PRESSURE: 132 MMHG | DIASTOLIC BLOOD PRESSURE: 80 MMHG | HEIGHT: 64 IN | HEART RATE: 70 BPM | OXYGEN SATURATION: 99 % | WEIGHT: 267 LBS

## 2022-10-19 DIAGNOSIS — G43.C0 PERIODIC HEADACHE SYNDROME, NOT INTRACTABLE: Primary | ICD-10-CM

## 2022-10-19 DIAGNOSIS — G47.33 OSA ON CPAP: ICD-10-CM

## 2022-10-19 DIAGNOSIS — E66.01 MORBID OBESITY WITH BMI OF 45.0-49.9, ADULT (HCC): ICD-10-CM

## 2022-10-19 DIAGNOSIS — E66.01 CLASS 3 SEVERE OBESITY DUE TO EXCESS CALORIES WITHOUT SERIOUS COMORBIDITY WITH BODY MASS INDEX (BMI) OF 45.0 TO 49.9 IN ADULT (HCC): Primary | ICD-10-CM

## 2022-10-19 DIAGNOSIS — Z99.89 OSA ON CPAP: ICD-10-CM

## 2022-10-19 PROCEDURE — 3008F BODY MASS INDEX DOCD: CPT | Performed by: INTERNAL MEDICINE

## 2022-10-19 PROCEDURE — 99213 OFFICE O/P EST LOW 20 MIN: CPT | Performed by: STUDENT IN AN ORGANIZED HEALTH CARE EDUCATION/TRAINING PROGRAM

## 2022-10-19 PROCEDURE — 99244 OFF/OP CNSLTJ NEW/EST MOD 40: CPT | Performed by: INTERNAL MEDICINE

## 2022-10-19 PROCEDURE — 3008F BODY MASS INDEX DOCD: CPT | Performed by: STUDENT IN AN ORGANIZED HEALTH CARE EDUCATION/TRAINING PROGRAM

## 2022-10-19 PROCEDURE — 3079F DIAST BP 80-89 MM HG: CPT | Performed by: INTERNAL MEDICINE

## 2022-10-19 PROCEDURE — 3079F DIAST BP 80-89 MM HG: CPT | Performed by: STUDENT IN AN ORGANIZED HEALTH CARE EDUCATION/TRAINING PROGRAM

## 2022-10-19 PROCEDURE — 3074F SYST BP LT 130 MM HG: CPT | Performed by: INTERNAL MEDICINE

## 2022-10-19 PROCEDURE — 3075F SYST BP GE 130 - 139MM HG: CPT | Performed by: STUDENT IN AN ORGANIZED HEALTH CARE EDUCATION/TRAINING PROGRAM

## 2022-10-19 NOTE — PATIENT INSTRUCTIONS
For migraine prophylaxis  Riboflavin (B2) 400 mg Oral Daily  Magnesium Oxide 400 mg Oral Nightly
yes

## 2022-11-08 ENCOUNTER — OFFICE VISIT (OUTPATIENT)
Dept: SURGERY | Facility: CLINIC | Age: 32
End: 2022-11-08
Payer: COMMERCIAL

## 2022-11-08 VITALS — BODY MASS INDEX: 46.75 KG/M2 | HEIGHT: 64 IN | WEIGHT: 273.81 LBS

## 2022-11-08 DIAGNOSIS — I10 ESSENTIAL HYPERTENSION: ICD-10-CM

## 2022-11-08 DIAGNOSIS — G47.33 OSA ON CPAP: ICD-10-CM

## 2022-11-08 DIAGNOSIS — E55.9 VITAMIN D DEFICIENCY: ICD-10-CM

## 2022-11-08 DIAGNOSIS — E66.01 MORBID OBESITY WITH BMI OF 45.0-49.9, ADULT (HCC): ICD-10-CM

## 2022-11-08 DIAGNOSIS — Z99.89 OSA ON CPAP: ICD-10-CM

## 2022-11-08 PROCEDURE — 97803 MED NUTRITION INDIV SUBSEQ: CPT

## 2022-11-08 PROCEDURE — 3008F BODY MASS INDEX DOCD: CPT

## 2022-11-08 NOTE — PATIENT INSTRUCTIONS
Recommendations/Status   1. Keep a food record, My Net Diary, select the macros dashboard or My Fitness Pal.   2. Strive to consume at least 4-6 meals/snacks per day. Aim for 44-55 grams of protein per day. Try to keep the carbohydrates at 120 grams per day or less. Increase fruit and vegetables in daily diet. 3. Practice eating strategies, eat separately from drinking, avoid straws, chew food 20-30 times before swallowing. Make the meals last 30 minutes. 4. Aim for 64 oz per day of water. (Try Protein water, adding True Lemon, Crystal Light). 5. Eliminate caffeine, carbonation, and juice. 6. Exercise and strength train with a goal of 30 minutes per day for exercise (for example,walking). 7.  Add strength training 10 minutes 3 days per week. (Focus on legs)  8. Do quizzes in binder pg 18-25 for review at next visit.

## 2022-11-17 NOTE — TELEPHONE ENCOUNTER
Called pt in regards to pending FMLA forms. Pt states Dr Kun Damon completed forms.  No further action needed  Symptoms

## 2022-12-07 ENCOUNTER — OFFICE VISIT (OUTPATIENT)
Dept: SURGERY | Facility: CLINIC | Age: 32
End: 2022-12-07
Payer: COMMERCIAL

## 2022-12-07 VITALS
OXYGEN SATURATION: 99 % | WEIGHT: 274 LBS | SYSTOLIC BLOOD PRESSURE: 120 MMHG | HEIGHT: 64 IN | HEART RATE: 89 BPM | BODY MASS INDEX: 46.78 KG/M2 | DIASTOLIC BLOOD PRESSURE: 80 MMHG

## 2022-12-07 DIAGNOSIS — Z99.89 OSA ON CPAP: ICD-10-CM

## 2022-12-07 DIAGNOSIS — I10 ESSENTIAL HYPERTENSION: Primary | ICD-10-CM

## 2022-12-07 DIAGNOSIS — G47.33 OSA ON CPAP: ICD-10-CM

## 2023-01-17 ENCOUNTER — OFFICE VISIT (OUTPATIENT)
Dept: SURGERY | Facility: CLINIC | Age: 33
End: 2023-01-17
Payer: COMMERCIAL

## 2023-01-17 VITALS — WEIGHT: 273.81 LBS | HEIGHT: 64 IN | BODY MASS INDEX: 46.75 KG/M2

## 2023-01-17 DIAGNOSIS — I10 ESSENTIAL HYPERTENSION: ICD-10-CM

## 2023-01-17 DIAGNOSIS — E66.01 MORBID OBESITY WITH BMI OF 45.0-49.9, ADULT (HCC): ICD-10-CM

## 2023-01-17 DIAGNOSIS — Z99.89 OSA ON CPAP: ICD-10-CM

## 2023-01-17 DIAGNOSIS — E55.9 VITAMIN D DEFICIENCY: ICD-10-CM

## 2023-01-17 DIAGNOSIS — G47.33 OSA ON CPAP: ICD-10-CM

## 2023-01-17 PROCEDURE — 97803 MED NUTRITION INDIV SUBSEQ: CPT

## 2023-01-17 PROCEDURE — 3008F BODY MASS INDEX DOCD: CPT

## 2023-01-17 NOTE — PATIENT INSTRUCTIONS
Recommendations/goals:    1. Restart a food record, My Net Diary, select the macros dashboard. Try pre-logging. 2. Add a meal or snack. Strive to consume at least 4-6 meals/snacks per day. Aim for 44-55 grams of protein per day. Try to keep the carbohydrates at 120 grams per day or less. Increase fruit and vegetables in daily diet. 3. Continue to practice eating strategies, eat separately from drinking, avoid straws, chew food 20-30 times before swallowing. Make the meals last 30 minutes. 4. Continue to drink at least 64 oz per day of water. (Try Protein water, adding True Lemon, Crystal Light). 5.Continue to exercise with a goal of 30 minutes per day for exercise (for example,walking). 7.  Add strength training 10 minutes 3 days per week.

## 2023-01-18 ENCOUNTER — OFFICE VISIT (OUTPATIENT)
Dept: SURGERY | Facility: CLINIC | Age: 33
End: 2023-01-18
Payer: COMMERCIAL

## 2023-01-18 VITALS
HEART RATE: 69 BPM | OXYGEN SATURATION: 99 % | WEIGHT: 272 LBS | HEIGHT: 64 IN | SYSTOLIC BLOOD PRESSURE: 132 MMHG | BODY MASS INDEX: 46.44 KG/M2 | DIASTOLIC BLOOD PRESSURE: 80 MMHG

## 2023-01-18 DIAGNOSIS — G47.33 OSA ON CPAP: ICD-10-CM

## 2023-01-18 DIAGNOSIS — Z99.89 OSA ON CPAP: ICD-10-CM

## 2023-01-18 DIAGNOSIS — E66.01 MORBID OBESITY WITH BMI OF 45.0-49.9, ADULT (HCC): Primary | ICD-10-CM

## 2023-02-20 ENCOUNTER — OFFICE VISIT (OUTPATIENT)
Dept: INTERNAL MEDICINE CLINIC | Facility: CLINIC | Age: 33
End: 2023-02-20
Payer: COMMERCIAL

## 2023-02-20 VITALS — HEIGHT: 64 IN | BODY MASS INDEX: 46.89 KG/M2 | WEIGHT: 274.63 LBS

## 2023-02-20 DIAGNOSIS — E55.9 VITAMIN D DEFICIENCY: ICD-10-CM

## 2023-02-20 DIAGNOSIS — E66.01 MORBID OBESITY WITH BMI OF 45.0-49.9, ADULT (HCC): ICD-10-CM

## 2023-02-20 DIAGNOSIS — G47.33 OSA ON CPAP: ICD-10-CM

## 2023-02-20 DIAGNOSIS — Z99.89 OSA ON CPAP: ICD-10-CM

## 2023-02-20 DIAGNOSIS — I10 ESSENTIAL HYPERTENSION: ICD-10-CM

## 2023-02-20 NOTE — PATIENT INSTRUCTIONS
Recommendations/goals:   Keep it going goals:   1. Continue to keep a food record, My Net Diary, select the macros dashboard. 2. Add a meal or snack. Strive to consume at least 4-6 meals/snacks per day. Aim for 44-55 grams of protein per day. Try to keep the carbohydrates at 120 grams per day or less. Increase fruit and vegetables in daily diet. 3. Continue to practice eating strategies, eat separately from drinking, avoid straws, chew food 20-30 times before swallowing. Make the meals last 30 minutes. 4. Continue to drink at least 64 oz per day of water. (Try Protein water, adding True Lemon, Crystal Light). 5.Continue to exercise with a goal of 30 minutes per day for exercise (for example,walking or basketball). 7.  Continue to strength training 10 minutes 3 days per week. Work in progress goals:   1. Line up your protein supplements for liquid protein. 2.  Buy 1 bottle of CHG soap. 3.  Buy the Ensure -Pre Surgery drink, 2 bottles. 4.  Buy the liquid or dissolvable Tylenol. [email or web address]

## 2023-03-10 ENCOUNTER — OFFICE VISIT (OUTPATIENT)
Dept: FAMILY MEDICINE CLINIC | Facility: CLINIC | Age: 33
End: 2023-03-10

## 2023-03-10 VITALS
OXYGEN SATURATION: 98 % | WEIGHT: 278 LBS | DIASTOLIC BLOOD PRESSURE: 74 MMHG | SYSTOLIC BLOOD PRESSURE: 120 MMHG | TEMPERATURE: 98 F | HEIGHT: 64 IN | BODY MASS INDEX: 47.46 KG/M2 | RESPIRATION RATE: 18 BRPM | HEART RATE: 74 BPM

## 2023-03-10 DIAGNOSIS — M54.50 LUMBAR BACK PAIN: Primary | ICD-10-CM

## 2023-03-10 PROCEDURE — 3074F SYST BP LT 130 MM HG: CPT

## 2023-03-10 PROCEDURE — 3008F BODY MASS INDEX DOCD: CPT

## 2023-03-10 PROCEDURE — 99213 OFFICE O/P EST LOW 20 MIN: CPT

## 2023-03-10 PROCEDURE — 3078F DIAST BP <80 MM HG: CPT

## 2023-03-10 RX ORDER — CYCLOBENZAPRINE HCL 5 MG
5 TABLET ORAL NIGHTLY PRN
Qty: 15 TABLET | Refills: 0 | Status: SHIPPED | OUTPATIENT
Start: 2023-03-10

## 2023-03-10 RX ORDER — IBUPROFEN 800 MG/1
800 TABLET ORAL EVERY 8 HOURS PRN
Qty: 45 TABLET | Refills: 0 | Status: SHIPPED | OUTPATIENT
Start: 2023-03-10

## 2023-03-21 ENCOUNTER — OFFICE VISIT (OUTPATIENT)
Dept: INTERNAL MEDICINE CLINIC | Facility: CLINIC | Age: 33
End: 2023-03-21
Payer: COMMERCIAL

## 2023-03-21 DIAGNOSIS — E66.01 MORBID OBESITY WITH BMI OF 45.0-49.9, ADULT (HCC): ICD-10-CM

## 2023-03-21 PROCEDURE — 97803 MED NUTRITION INDIV SUBSEQ: CPT

## 2023-03-22 ENCOUNTER — OFFICE VISIT (OUTPATIENT)
Dept: SURGERY | Facility: CLINIC | Age: 33
End: 2023-03-22
Payer: COMMERCIAL

## 2023-03-22 VITALS
HEART RATE: 107 BPM | WEIGHT: 277 LBS | SYSTOLIC BLOOD PRESSURE: 132 MMHG | HEIGHT: 64 IN | OXYGEN SATURATION: 99 % | BODY MASS INDEX: 47.29 KG/M2 | DIASTOLIC BLOOD PRESSURE: 80 MMHG

## 2023-03-22 DIAGNOSIS — Z51.81 ENCOUNTER FOR THERAPEUTIC DRUG MONITORING: ICD-10-CM

## 2023-03-22 DIAGNOSIS — E55.9 VITAMIN D DEFICIENCY: ICD-10-CM

## 2023-03-22 DIAGNOSIS — I10 ESSENTIAL HYPERTENSION: Primary | ICD-10-CM

## 2023-03-22 DIAGNOSIS — E66.01 MORBID OBESITY WITH BMI OF 45.0-49.9, ADULT (HCC): ICD-10-CM

## 2023-04-19 ENCOUNTER — OFFICE VISIT (OUTPATIENT)
Dept: SURGERY | Facility: CLINIC | Age: 33
End: 2023-04-19
Payer: COMMERCIAL

## 2023-04-19 VITALS
DIASTOLIC BLOOD PRESSURE: 70 MMHG | WEIGHT: 280 LBS | SYSTOLIC BLOOD PRESSURE: 110 MMHG | HEART RATE: 88 BPM | OXYGEN SATURATION: 98 % | HEIGHT: 64 IN | BODY MASS INDEX: 47.8 KG/M2

## 2023-04-19 DIAGNOSIS — Z99.89 OSA ON CPAP: ICD-10-CM

## 2023-04-19 DIAGNOSIS — I10 ESSENTIAL HYPERTENSION: Primary | ICD-10-CM

## 2023-04-19 DIAGNOSIS — G47.33 OSA ON CPAP: ICD-10-CM

## 2023-04-19 DIAGNOSIS — E66.01 MORBID OBESITY WITH BMI OF 45.0-49.9, ADULT (HCC): ICD-10-CM

## 2023-04-19 DIAGNOSIS — Z51.81 ENCOUNTER FOR THERAPEUTIC DRUG MONITORING: ICD-10-CM

## 2023-04-19 PROCEDURE — 3008F BODY MASS INDEX DOCD: CPT | Performed by: INTERNAL MEDICINE

## 2023-04-19 PROCEDURE — 99214 OFFICE O/P EST MOD 30 MIN: CPT | Performed by: INTERNAL MEDICINE

## 2023-04-19 PROCEDURE — 3074F SYST BP LT 130 MM HG: CPT | Performed by: INTERNAL MEDICINE

## 2023-04-19 PROCEDURE — 3078F DIAST BP <80 MM HG: CPT | Performed by: INTERNAL MEDICINE

## 2023-04-19 RX ORDER — PHENTERMINE HYDROCHLORIDE 37.5 MG/1
37.5 CAPSULE ORAL EVERY MORNING
Qty: 30 CAPSULE | Refills: 2 | Status: SHIPPED | OUTPATIENT
Start: 2023-04-19

## 2023-04-20 ENCOUNTER — TELEPHONE (OUTPATIENT)
Dept: SURGERY | Facility: CLINIC | Age: 33
End: 2023-04-20

## 2023-04-20 NOTE — TELEPHONE ENCOUNTER
Good Rx savings card applied to Phentermine script. Our office contacted [facility].

## 2023-05-05 ENCOUNTER — OFFICE VISIT (OUTPATIENT)
Dept: FAMILY MEDICINE CLINIC | Facility: CLINIC | Age: 33
End: 2023-05-05

## 2023-05-05 VITALS
BODY MASS INDEX: 47.43 KG/M2 | DIASTOLIC BLOOD PRESSURE: 80 MMHG | TEMPERATURE: 97 F | HEIGHT: 64 IN | HEART RATE: 99 BPM | SYSTOLIC BLOOD PRESSURE: 120 MMHG | OXYGEN SATURATION: 98 % | WEIGHT: 277.81 LBS

## 2023-05-05 DIAGNOSIS — I10 ESSENTIAL HYPERTENSION: ICD-10-CM

## 2023-05-05 DIAGNOSIS — Z99.89 OSA ON CPAP: ICD-10-CM

## 2023-05-05 DIAGNOSIS — G47.33 OSA ON CPAP: ICD-10-CM

## 2023-05-05 DIAGNOSIS — G43.C0 PERIODIC HEADACHE SYNDROME, NOT INTRACTABLE: Primary | ICD-10-CM

## 2023-05-05 DIAGNOSIS — E66.01 MORBID OBESITY WITH BMI OF 45.0-49.9, ADULT (HCC): ICD-10-CM

## 2023-05-05 PROCEDURE — 3008F BODY MASS INDEX DOCD: CPT | Performed by: STUDENT IN AN ORGANIZED HEALTH CARE EDUCATION/TRAINING PROGRAM

## 2023-05-05 PROCEDURE — 3074F SYST BP LT 130 MM HG: CPT | Performed by: STUDENT IN AN ORGANIZED HEALTH CARE EDUCATION/TRAINING PROGRAM

## 2023-05-05 PROCEDURE — 3079F DIAST BP 80-89 MM HG: CPT | Performed by: STUDENT IN AN ORGANIZED HEALTH CARE EDUCATION/TRAINING PROGRAM

## 2023-05-05 PROCEDURE — 99213 OFFICE O/P EST LOW 20 MIN: CPT | Performed by: STUDENT IN AN ORGANIZED HEALTH CARE EDUCATION/TRAINING PROGRAM

## 2023-05-10 ENCOUNTER — OFFICE VISIT (OUTPATIENT)
Dept: FAMILY MEDICINE CLINIC | Facility: CLINIC | Age: 33
End: 2023-05-10

## 2023-05-10 VITALS
HEIGHT: 64 IN | BODY MASS INDEX: 46.95 KG/M2 | TEMPERATURE: 98 F | HEART RATE: 105 BPM | WEIGHT: 275 LBS | DIASTOLIC BLOOD PRESSURE: 73 MMHG | OXYGEN SATURATION: 98 % | SYSTOLIC BLOOD PRESSURE: 112 MMHG

## 2023-05-10 DIAGNOSIS — G89.29 CHRONIC PAIN OF BOTH KNEES: Primary | ICD-10-CM

## 2023-05-10 DIAGNOSIS — M25.561 CHRONIC PAIN OF BOTH KNEES: Primary | ICD-10-CM

## 2023-05-10 DIAGNOSIS — M25.562 CHRONIC PAIN OF BOTH KNEES: Primary | ICD-10-CM

## 2023-05-10 DIAGNOSIS — G89.29 CHRONIC BILATERAL LOW BACK PAIN WITHOUT SCIATICA: ICD-10-CM

## 2023-05-10 DIAGNOSIS — M54.50 CHRONIC BILATERAL LOW BACK PAIN WITHOUT SCIATICA: ICD-10-CM

## 2023-05-10 PROCEDURE — 3078F DIAST BP <80 MM HG: CPT | Performed by: STUDENT IN AN ORGANIZED HEALTH CARE EDUCATION/TRAINING PROGRAM

## 2023-05-10 PROCEDURE — 3008F BODY MASS INDEX DOCD: CPT | Performed by: STUDENT IN AN ORGANIZED HEALTH CARE EDUCATION/TRAINING PROGRAM

## 2023-05-10 PROCEDURE — 3074F SYST BP LT 130 MM HG: CPT | Performed by: STUDENT IN AN ORGANIZED HEALTH CARE EDUCATION/TRAINING PROGRAM

## 2023-05-10 PROCEDURE — 99213 OFFICE O/P EST LOW 20 MIN: CPT | Performed by: STUDENT IN AN ORGANIZED HEALTH CARE EDUCATION/TRAINING PROGRAM

## 2023-05-15 ENCOUNTER — HOSPITAL ENCOUNTER (OUTPATIENT)
Dept: GENERAL RADIOLOGY | Facility: HOSPITAL | Age: 33
Discharge: HOME | End: 2023-05-15
Attending: STUDENT IN AN ORGANIZED HEALTH CARE EDUCATION/TRAINING PROGRAM
Payer: OTHER MISCELLANEOUS

## 2023-05-15 DIAGNOSIS — M54.50 CHRONIC BILATERAL LOW BACK PAIN WITHOUT SCIATICA: ICD-10-CM

## 2023-05-15 DIAGNOSIS — G89.29 CHRONIC BILATERAL LOW BACK PAIN WITHOUT SCIATICA: ICD-10-CM

## 2023-05-15 PROCEDURE — 72110 X-RAY EXAM L-2 SPINE 4/>VWS: CPT | Performed by: STUDENT IN AN ORGANIZED HEALTH CARE EDUCATION/TRAINING PROGRAM

## 2023-05-16 ENCOUNTER — TELEPHONE (OUTPATIENT)
Dept: PHYSICAL THERAPY | Facility: HOSPITAL | Age: 33
End: 2023-05-16

## 2023-05-16 ENCOUNTER — OFFICE VISIT (OUTPATIENT)
Dept: PHYSICAL MEDICINE AND REHAB | Facility: CLINIC | Age: 33
End: 2023-05-16
Payer: COMMERCIAL

## 2023-05-16 VITALS — SYSTOLIC BLOOD PRESSURE: 112 MMHG | HEART RATE: 85 BPM | DIASTOLIC BLOOD PRESSURE: 68 MMHG | OXYGEN SATURATION: 100 %

## 2023-05-16 DIAGNOSIS — S33.5XXA LUMBAR SPRAIN, INITIAL ENCOUNTER: Primary | ICD-10-CM

## 2023-05-16 PROCEDURE — 99204 OFFICE O/P NEW MOD 45 MIN: CPT | Performed by: PHYSICAL MEDICINE & REHABILITATION

## 2023-05-16 PROCEDURE — 3074F SYST BP LT 130 MM HG: CPT | Performed by: PHYSICAL MEDICINE & REHABILITATION

## 2023-05-16 PROCEDURE — 3078F DIAST BP <80 MM HG: CPT | Performed by: PHYSICAL MEDICINE & REHABILITATION

## 2023-05-16 RX ORDER — PREDNISONE 10 MG/1
TABLET ORAL
Qty: 28 TABLET | Refills: 0 | Status: SHIPPED | OUTPATIENT
Start: 2023-05-16

## 2023-05-17 ENCOUNTER — TELEPHONE (OUTPATIENT)
Dept: PHYSICAL THERAPY | Facility: HOSPITAL | Age: 33
End: 2023-05-17

## 2023-05-18 ENCOUNTER — OFFICE VISIT (OUTPATIENT)
Dept: PHYSICAL THERAPY | Facility: HOSPITAL | Age: 33
End: 2023-05-18
Attending: PHYSICAL MEDICINE & REHABILITATION
Payer: OTHER MISCELLANEOUS

## 2023-05-18 DIAGNOSIS — S33.5XXA LUMBAR SPRAIN, INITIAL ENCOUNTER: ICD-10-CM

## 2023-05-18 PROCEDURE — 97161 PT EVAL LOW COMPLEX 20 MIN: CPT | Performed by: PHYSICAL THERAPIST

## 2023-05-18 PROCEDURE — 97110 THERAPEUTIC EXERCISES: CPT | Performed by: PHYSICAL THERAPIST

## 2023-05-23 ENCOUNTER — APPOINTMENT (OUTPATIENT)
Dept: PHYSICAL THERAPY | Facility: HOSPITAL | Age: 33
End: 2023-05-23
Attending: PHYSICAL MEDICINE & REHABILITATION
Payer: OTHER MISCELLANEOUS

## 2023-05-25 ENCOUNTER — OFFICE VISIT (OUTPATIENT)
Dept: PHYSICAL THERAPY | Facility: HOSPITAL | Age: 33
End: 2023-05-25
Attending: PHYSICAL MEDICINE & REHABILITATION
Payer: OTHER MISCELLANEOUS

## 2023-05-25 PROCEDURE — 97110 THERAPEUTIC EXERCISES: CPT | Performed by: PHYSICAL THERAPIST

## 2023-05-25 PROCEDURE — 97112 NEUROMUSCULAR REEDUCATION: CPT | Performed by: PHYSICAL THERAPIST

## 2023-05-30 ENCOUNTER — OFFICE VISIT (OUTPATIENT)
Dept: PHYSICAL THERAPY | Facility: HOSPITAL | Age: 33
End: 2023-05-30
Attending: PHYSICAL MEDICINE & REHABILITATION
Payer: OTHER MISCELLANEOUS

## 2023-05-30 PROCEDURE — 97110 THERAPEUTIC EXERCISES: CPT | Performed by: PHYSICAL THERAPIST

## 2023-05-30 PROCEDURE — 97112 NEUROMUSCULAR REEDUCATION: CPT | Performed by: PHYSICAL THERAPIST

## 2023-06-06 ENCOUNTER — APPOINTMENT (OUTPATIENT)
Dept: PHYSICAL THERAPY | Facility: HOSPITAL | Age: 33
End: 2023-06-06
Attending: PHYSICAL MEDICINE & REHABILITATION
Payer: OTHER MISCELLANEOUS

## 2023-06-07 ENCOUNTER — DOCUMENTATION ONLY (OUTPATIENT)
Dept: FAMILY MEDICINE CLINIC | Facility: CLINIC | Age: 33
End: 2023-06-07

## 2023-06-07 NOTE — PROGRESS NOTES
FMLA forms have been emailed to the forms department and sent over to the corporate center to the forms department.

## 2023-06-09 ENCOUNTER — TELEPHONE (OUTPATIENT)
Dept: PHYSICAL THERAPY | Facility: HOSPITAL | Age: 33
End: 2023-06-09

## 2023-06-09 ENCOUNTER — TELEPHONE (OUTPATIENT)
Dept: FAMILY MEDICINE CLINIC | Facility: CLINIC | Age: 33
End: 2023-06-09

## 2023-06-09 ENCOUNTER — APPOINTMENT (OUTPATIENT)
Dept: PHYSICAL THERAPY | Facility: HOSPITAL | Age: 33
End: 2023-06-09
Attending: PHYSICAL MEDICINE & REHABILITATION
Payer: OTHER MISCELLANEOUS

## 2023-06-12 ENCOUNTER — OFFICE VISIT (OUTPATIENT)
Dept: PHYSICAL THERAPY | Facility: HOSPITAL | Age: 33
End: 2023-06-12
Attending: PHYSICAL MEDICINE & REHABILITATION
Payer: OTHER MISCELLANEOUS

## 2023-06-12 PROCEDURE — 97112 NEUROMUSCULAR REEDUCATION: CPT | Performed by: PHYSICAL THERAPIST

## 2023-06-12 PROCEDURE — 97110 THERAPEUTIC EXERCISES: CPT | Performed by: PHYSICAL THERAPIST

## 2023-06-12 PROCEDURE — 97530 THERAPEUTIC ACTIVITIES: CPT | Performed by: PHYSICAL THERAPIST

## 2023-06-14 ENCOUNTER — OFFICE VISIT (OUTPATIENT)
Dept: SURGERY | Facility: CLINIC | Age: 33
End: 2023-06-14
Payer: COMMERCIAL

## 2023-06-14 VITALS
HEIGHT: 64 IN | DIASTOLIC BLOOD PRESSURE: 80 MMHG | OXYGEN SATURATION: 100 % | WEIGHT: 272 LBS | SYSTOLIC BLOOD PRESSURE: 110 MMHG | BODY MASS INDEX: 46.44 KG/M2 | HEART RATE: 79 BPM

## 2023-06-14 DIAGNOSIS — Z99.89 OSA ON CPAP: ICD-10-CM

## 2023-06-14 DIAGNOSIS — G47.33 OSA ON CPAP: ICD-10-CM

## 2023-06-14 DIAGNOSIS — I10 ESSENTIAL HYPERTENSION: ICD-10-CM

## 2023-06-14 DIAGNOSIS — E66.01 MORBID OBESITY WITH BMI OF 45.0-49.9, ADULT (HCC): Primary | ICD-10-CM

## 2023-06-14 RX ORDER — APREPITANT 40 MG/1
40 CAPSULE ORAL ONCE
Qty: 1 CAPSULE | Refills: 0 | Status: SHIPPED | OUTPATIENT
Start: 2023-06-14 | End: 2023-06-14

## 2023-06-15 ENCOUNTER — VIRTUAL PHONE E/M (OUTPATIENT)
Dept: SURGERY | Facility: CLINIC | Age: 33
End: 2023-06-15
Payer: COMMERCIAL

## 2023-06-15 VITALS — BODY MASS INDEX: 46.95 KG/M2 | WEIGHT: 275 LBS | HEIGHT: 64 IN

## 2023-06-15 DIAGNOSIS — G47.33 OSA ON CPAP: ICD-10-CM

## 2023-06-15 DIAGNOSIS — Z99.89 OSA ON CPAP: ICD-10-CM

## 2023-06-15 DIAGNOSIS — E66.01 MORBID OBESITY WITH BMI OF 45.0-49.9, ADULT (HCC): ICD-10-CM

## 2023-06-15 DIAGNOSIS — Z51.81 ENCOUNTER FOR THERAPEUTIC DRUG MONITORING: ICD-10-CM

## 2023-06-15 DIAGNOSIS — I10 ESSENTIAL HYPERTENSION: Primary | ICD-10-CM

## 2023-06-15 PROCEDURE — 99213 OFFICE O/P EST LOW 20 MIN: CPT | Performed by: INTERNAL MEDICINE

## 2023-06-15 NOTE — PATIENT INSTRUCTIONS
Outpatient Encounter Medications as of 6/15/2023   Medication Sig Note    [DISCONTINUED] predniSONE 10 MG Oral Tab Take 7 tablets today,take 6 tablets tomorrow, then decrease number of tablets by one tablet each of the remaining days     hydroCHLOROthiazide 12.5 MG Oral Tab Take 1 tablet (12.5 mg total) by mouth daily. 6/15/2023: Discontinue two weeks prior to surgery    [DISCONTINUED] Phentermine HCl 37.5 MG Oral Cap Take 1 capsule (37.5 mg total) by mouth every morning. [DISCONTINUED] ibuprofen 800 MG Oral Tab Take 1 tablet (800 mg total) by mouth every 8 (eight) hours as needed for Pain. [DISCONTINUED] cyclobenzaprine 5 MG Oral Tab Take 1 tablet (5 mg total) by mouth nightly as needed for Muscle spasms. No facility-administered encounter medications on file as of 6/15/2023.

## 2023-06-20 ENCOUNTER — OFFICE VISIT (OUTPATIENT)
Dept: PHYSICAL THERAPY | Facility: HOSPITAL | Age: 33
End: 2023-06-20
Attending: PHYSICAL MEDICINE & REHABILITATION
Payer: OTHER MISCELLANEOUS

## 2023-06-20 PROCEDURE — 97110 THERAPEUTIC EXERCISES: CPT | Performed by: PHYSICAL THERAPIST

## 2023-06-20 PROCEDURE — 97530 THERAPEUTIC ACTIVITIES: CPT | Performed by: PHYSICAL THERAPIST

## 2023-06-20 PROCEDURE — 97112 NEUROMUSCULAR REEDUCATION: CPT | Performed by: PHYSICAL THERAPIST

## 2023-06-20 NOTE — PROGRESS NOTES
2023  Patient Name: Tina Pete  YOB: 1990          MRN number:  [id number]  Referring Physician:  Derik Nick    Progress  Summary    Dx:        Lumbar sprain, initial encounter (S33.5XXA)   Authorized # of Visits:  12 visits         Next MD visit: none   Fall Risk: standard         Precautions:  general  Medication Changes since last visit?: No    Subjective: Reports 50% improvement since starting therapy. States her back feels better when using the lifting and body mechanics that she has been taught in therapy. States she notices more pain if she bends over the wrong way. States States she has started to work out at home to increase her endurance in preparation to go back to work. Feels PT has been very helpful. Pain Ratin VAS on average, 6/10 VAS at the worst (last 3-5 minutes, happens 2-3 times per week).       Objective:     ROM:     Trunk         Pain (+/-)   Flexion Decreased 25%               Decreased reversal of the lumbar lordosis   Extension WFL    R Sidebend WFL    L Sidebend WFL    R Rotation NT    L Rotation NT    R Sideglide WFL    L Sideglide WFL      Repeated Motion Testing: REIL - decreased/better      STRENGTH:   5/5 MMT Scale   Left  Right Comments   Hip Flexion (L2) 5   5    Knee Extension (L3) 5 5    Knee Flexion 5 5    Ankle DF (L4) 5 5    EHL (L5) 5 5    Ankle PF (S1) 5 5    Hip Abduction NT NT    Hip Extension NT NT      Flexibility:      R L   Hamstrings long long   [facility]     Neuro Screen: (-) heel walking and toe walking    Oswestry Disability Index Score    26% 2023  Visit #3 2023  Visit #4 2023  Visit #5   Manual Therapy      Therapeutic Exercise Single leg RDL    Squats with 5 lbs for lifting mechanics from 8 inch box    1/2 plank on table    REIL    Wall squats Prone lying    REIL      1/2 plank on table     NuStep x 10 minutes with 30 second internal bursts every 2 minute    Bear planks    1/2 planks   Therapeutic Activity  Education on use of a stool in sitting to support the spine    Education on use of a portable stool when not at home    Education on sitting position to sit on the back of the sit bone    Education  On lifting and body mechanics Functional squatting drills    Sit to stand from chair holding ball for core stability with hip hinge    Single leg golfers reach   Neuromuscular Education Modified all 4 belly ift    Standing wall supported IO/TA's    Standing supported wall reach Modified all 4 belly lift    Standing wall supported IO/TA Modified all 4 belly lift    Standing wall supported IO/TA       TNE Education      HEP Single leg RDL    Squats with 5 lbs for lifting mechanics from 8 inch box    1/2 plank on table    REIL    Wall squats Continue with current HEP    incorporate home management principles listed above        Assessment: Dilan Mccullough has completed 5 visits of PT and has progressed well, reporting 50% improvement. She displays 5/5 LE strength but still lacks core control with 3/5 rectus abdominus strength and 3/5 with functional squat testing. She also has difficutly reversing her lumbar lordosis with forward flexion and would benefit from further endurance training. She has been compliant with her HEP. Recommend the patient complete her remaining 7 PT visits at 1-2 times per week and then return to the MD for work clearance. PT will focus on core strengthening, lifting and body mechanics, endurance training and agility training (to promote quick movement if she needs to address altercations at work) and progression of HEP. The pt is in agreement with the POC. The pt was educated on the plan of care, purpose and individual goals for therapy, precautions for therapy. All questions were answered. 1.  The pt will be independent in their HEP. MET 6/20/2023  2. Centralization of symptoms to the lumbar spine. MET 6/20/2023  3.   The pt will be independent in a modified workout program.  PROGRESSING 6/20/2023  4. The pt will RTW with the MD\"s approval.  PROGRESSING 6/20/2023  5. The pt will displays full lumbar ROM to allow her to bend over without pain. PROGRESSING 6/20/2023      Frequency / Duration: Patient will be seen for 1-2 x/week or a total of 7 visits over a 90 day period. Treatment will include: Manual Therapy; Therapeutic Exercises; Neuromuscular Re-education; Therapeutic Activity; Patient education: Home exercise program instruction; TNE Education; Modalities as needed. Education or treatment limitation: None  Rehab Potential good      Charges: TE 2 (23), NM 1 (10), TA (12)     Total Timed Treatment: 45 min  Total Treatment Time: 45  min       Patient was advised of these findings, precautions, and treatment options and has agreed to actively participate in planning and for this course of care. Thank you for your referral. Please co-sign or sign and return this letter via fax as soon as possible to [phone]. If you have any questions, please contact me at Dept: [phone]. Sincerely,  Laura Pope PT    Electronically signed by therapist: Laura Pope PT    [de-identified] certification required: Yes  I certify the need for these services furnished under this plan of treatment and while under my care.     X___________________________________________________ Date____________________    Certification From: 8/39/4101  To:9/18/2023

## 2023-06-26 ENCOUNTER — OFFICE VISIT (OUTPATIENT)
Dept: PHYSICAL THERAPY | Facility: HOSPITAL | Age: 33
End: 2023-06-26
Attending: PHYSICAL MEDICINE & REHABILITATION
Payer: OTHER MISCELLANEOUS

## 2023-06-26 PROCEDURE — 97110 THERAPEUTIC EXERCISES: CPT | Performed by: PHYSICAL THERAPIST

## 2023-06-26 PROCEDURE — 97112 NEUROMUSCULAR REEDUCATION: CPT | Performed by: PHYSICAL THERAPIST

## 2023-06-26 PROCEDURE — 97530 THERAPEUTIC ACTIVITIES: CPT | Performed by: PHYSICAL THERAPIST

## 2023-06-27 ENCOUNTER — OFFICE VISIT (OUTPATIENT)
Dept: PHYSICAL MEDICINE AND REHAB | Facility: CLINIC | Age: 33
End: 2023-06-27
Payer: OTHER MISCELLANEOUS

## 2023-06-27 ENCOUNTER — OFFICE VISIT (OUTPATIENT)
Dept: PHYSICAL THERAPY | Facility: HOSPITAL | Age: 33
End: 2023-06-27
Attending: PHYSICAL MEDICINE & REHABILITATION
Payer: OTHER MISCELLANEOUS

## 2023-06-27 ENCOUNTER — TELEPHONE (OUTPATIENT)
Dept: PHYSICAL MEDICINE AND REHAB | Facility: CLINIC | Age: 33
End: 2023-06-27

## 2023-06-27 VITALS — HEIGHT: 64 IN | OXYGEN SATURATION: 99 % | BODY MASS INDEX: 46.44 KG/M2 | HEART RATE: 79 BPM | WEIGHT: 272 LBS

## 2023-06-27 DIAGNOSIS — S33.5XXA LUMBAR SPRAIN, INITIAL ENCOUNTER: Primary | ICD-10-CM

## 2023-06-27 PROCEDURE — 97530 THERAPEUTIC ACTIVITIES: CPT | Performed by: PHYSICAL THERAPIST

## 2023-06-27 PROCEDURE — 3008F BODY MASS INDEX DOCD: CPT | Performed by: PHYSICAL MEDICINE & REHABILITATION

## 2023-06-27 PROCEDURE — 97110 THERAPEUTIC EXERCISES: CPT | Performed by: PHYSICAL THERAPIST

## 2023-06-27 PROCEDURE — 97112 NEUROMUSCULAR REEDUCATION: CPT | Performed by: PHYSICAL THERAPIST

## 2023-06-27 PROCEDURE — 99214 OFFICE O/P EST MOD 30 MIN: CPT | Performed by: PHYSICAL MEDICINE & REHABILITATION

## 2023-06-27 RX ORDER — PHENTERMINE HYDROCHLORIDE 37.5 MG/1
37.5 CAPSULE ORAL EVERY MORNING
COMMUNITY
Start: 2023-06-19

## 2023-06-27 RX ORDER — APREPITANT 40 MG/1
CAPSULE ORAL
COMMUNITY
Start: 2023-06-15
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
## 2023-07-18 NOTE — PROGRESS NOTES
2023  Patient Name: Darrick Maria  YOB: 1990          MRN number:  [id number]  Referring Physician:  Andrey Hernandez    Dx:        Lumbar sprain, initial encounter (S33.5XXA)   Authorized # of Visits:  12 visits         Next MD visit: none   Fall Risk: standard         Precautions:  general  Medication Changes since last visit?: No    Subjective: Reports now that she is moved, she is sleeping back in her own bed, her back is feeling better. Reports 85% improvement since starting therapy. States she has been trying to workout daily. Cancelled her weight loss surgery. Pain Ratin/10 VAS    Objective:     ROM:     Trunk         Pain (+/-)   Flexion Decreased 25%               Decreased reversal of the lumbar lordosis   Extension WFL    R Sidebend WFL    L Sidebend WFL    R Rotation NT    L Rotation NT    R Sideglide WFL    L Sideglide WFL      Repeated Motion Testing: REIL - decreased/better      STRENGTH:   5/5 MMT Scale   Left  Right Comments   Hip Flexion (L2) 5   5    Knee Extension (L3) 5 5    Knee Flexion 5 5    Ankle DF (L4) 5 5    EHL (L5) 5 5    Ankle PF (S1) 5 5    Hip Abduction NT NT    Hip Extension NT NT      Flexibility:      R L   Hamstrings long long   [facility]     Neuro Screen: (-) heel walking and toe walking    Oswestry Disability Index Score    26% 2023  Visit #9   Manual Therapy    Therapeutic Exercise NuStep x 20 minutes with 30 second internal bursts every 2 minute    Wall squat with    alternating triceps curls    OH press    Single leg RDL's with 5 lb bicep curls   Therapeutic Activity    Neuromuscular Education 1/2 plank    Modified all 4 belly lift    Bear plank   TNE Education    HEP        Assessment: Nereyda Ohara has completed 9 visits and has progressed very well. She reports 85% improvement overall and rates her pain as 2/10 VAS.   She is not tolerating higher level core strengthening and endurance training without an increase in pain. She has returned to the gym to work out. She has been compliant with her HEP. Will continue PT until the patient see's the MD but anticipate release to full duty work. 1.  The pt will be independent in their HEP. MET 6/20/2023  2. Centralization of symptoms to the lumbar spine. MET 6/20/2023  3. The pt will be independent in a modified workout program.  PROGRESSING 6/20/2023  4. The pt will RTW with the MD\"s approval.  PROGRESSING 6/20/2023  5. The pt will displays full lumbar ROM to allow her to bend over without pain. PROGRESSING 6/20/2023      Frequency / Duration: Patient will be seen for 1-2 x/week or a total of 1-3 visits over a 90 day period. Treatment will include: Manual Therapy; Therapeutic Exercises; Neuromuscular Re-education; Therapeutic Activity; Patient education: Home exercise program instruction; TNE Education; Modalities as needed.     Education or treatment limitation: None  Rehab Potential good      Charges: TE 3(38), NM 1 (10)     Total Timed Treatment: 48 min  Total Treatment Time: 48 min       Certification From: 2/98/1595  To:9/25/2023

## 2023-07-21 NOTE — TELEPHONE ENCOUNTER
Lesvia at Dr Nevin Banuelos left a message that the pt referral is not Daisy Pink ation number [id number] dated 4/11/18 and needs a new referral. [FreeTextEntry1] : - supportive care: Vaseline/A and D ointment application before and gentle wipe after meal\par - hydrocortisone 1% trial\par - RTC if no improvement\par # extra breath sounds\par - reassurance

## 2023-07-25 NOTE — PROGRESS NOTES
2023  PROGRESSING NOTE   Patient Name: Gerhardt Aid  YOB: 1990          MRN number:  [id number]  Referring Physician:  Nicolasa Patino    Dx:        Lumbar sprain, initial encounter (S33.5XXA)   Authorized # of Visits:  12 visits         Next MD visit: none   Fall Risk: standard         Precautions:  general  Medication Changes since last visit?: No    Subjective:  States she feels ready to go back to work. States she has minimal symptoms and has bee working out at [employer] regularly. Pain Ratin-2/10 VAS    Objective:     ROM:     Trunk         Pain (+/-)   Flexion  WFL       Improved reversal of lumbar lordosis   Extension WFL    R Sidebend WFL    L Sidebend WFL    R Rotation NT    L Rotation NT    R Sideglide WFL    L Sideglide WFL        STRENGTH:   5/5 MMT Scale   Left  Right Comments   Hip Flexion (L2) 5   5    Knee Extension (L3) 5 5    Knee Flexion 5 5    Ankle DF (L4) 5 5    EHL (L5) 5 5    Ankle PF (S1) 5 5    Hip Abduction NT NT    Hip Extension NT NT      Flexibility:      R L   Hamstrings long long   [facility]     Neuro Screen: (-) heel walking and toe walking    Oswestry Disability Index Score    26% 2023  0% 2023  Visit #9 2023  Visit #10   Manual Therapy     Therapeutic Exercise NuStep x 20 minutes with 30 second internal bursts every 2 minute    Wall squat with    alternating triceps curls    OH press    Single leg RDL's with 5 lb bicep curls Reviewed HEP    Reviewed gym workouts    Re-assessment   Therapeutic Activity     Neuromuscular Education 1/2 plank    Modified all 4 belly lift    Bear plank Reviewed form for the exercises on    TNE Education     HEP         Assessment: Siena Wan has completed 10 visits and has progressed very well. She reports 85% improvement overall and rates her pain as 1-2/10 VAS. She is now tolerating higher level core strengthening and endurance training without an increase in pain.   She has returned to the gym to work out. She has been compliant with her HEP. Recommend DC PT at this time. Will defer to MD for RTW status. 1.  The pt will be independent in their HEP. MET 6/20/2023  2. Centralization of symptoms to the lumbar spine. MET 6/20/2023  3. The pt will be independent in a modified workout program.  MET 7/25/2023  4. The pt will RTW with the MD\"s approval.  MET 7/25/2023 (after MD visit)  5. The pt will displays full lumbar ROM to allow her to bend over without pain.  MET 7/25/2023    Plan:  DC PT at this time    Education or treatment limitation: None  Rehab Potential good      Charges: TE 3(38), NM 1 (8)     Total Timed Treatment: 46 min  Total Treatment Time: 46 min       Certification From: 9/23/4675  To:9/25/2023

## 2023-08-28 NOTE — TELEPHONE ENCOUNTER
----- Message from Frankey Kindler, APRN sent at 8/26/2023  8:58 PM CDT -----  Hemoglobin A1C is normal. No prediabetes.

## 2024-01-05 NOTE — PROGRESS NOTES
HPI:    Patient ID: Dilcia Garcia is a 33 year old female.    HPI  Pt presenting for routine physical exam. Denies any acute issues or recent illnesses. No significant chronic medical problems. Past medical/surgical history, family history, and social history were reviewed.     H/o chronic knee pain  Recent XR reviewed  Plans to follow-up with Bariatrics to reschedule weight loss surgery  Mood stable, denies SH/SI/HI      Review of Systems   A comprehensive 10 point review of systems was completed.  Pertinent positives and negatives noted in the the HPI.       Current Outpatient Medications   Medication Sig Dispense Refill    hydroCHLOROthiazide 12.5 MG Oral Tab Take 1 tablet (12.5 mg total) by mouth daily. 90 tablet 3    Clindamycin Phosphate 1 % External Gel Apply 1 Application topically nightly. 60 g 3    Phentermine HCl 37.5 MG Oral Cap Take 1 capsule (37.5 mg total) by mouth every morning. 30 capsule 2    ibuprofen 800 MG Oral Tab Take 1 tablet (800 mg total) by mouth every 6 (six) hours as needed for Pain.       Allergies:  Allergies   Allergen Reactions    Povidone Iodine RASH      Vitals:    01/05/24 0946   BP: 97/78   Pulse: 83   Temp: 98.2 °F (36.8 °C)   TempSrc: Temporal   SpO2: 98%   Weight: 256 lb 6.4 oz (116.3 kg)   Height: 5' 4\" (1.626 m)       Body mass index is 44.01 kg/m².   PHYSICAL EXAM:   Physical Exam  Vitals reviewed.   Constitutional:       General: She is not in acute distress.     Appearance: Normal appearance. She is well-developed.   HENT:      Head: Normocephalic and atraumatic.      Right Ear: Tympanic membrane, ear canal and external ear normal.      Left Ear: Tympanic membrane, ear canal and external ear normal.   Eyes:      Conjunctiva/sclera: Conjunctivae normal.   Neck:      Thyroid: No thyroid mass or thyroid tenderness.   Cardiovascular:      Rate and Rhythm: Normal rate and regular rhythm.      Pulses: Normal pulses.      Heart sounds: Normal heart sounds, S1 normal and S2  normal. No murmur heard.  Pulmonary:      Effort: Pulmonary effort is normal. No respiratory distress.      Breath sounds: Normal breath sounds. No wheezing, rhonchi or rales.   Abdominal:      General: Bowel sounds are normal.      Palpations: Abdomen is soft.      Tenderness: There is no abdominal tenderness. There is no guarding or rebound.   Musculoskeletal:      Cervical back: Normal range of motion and neck supple. No muscular tenderness.      Right lower leg: No edema.      Left lower leg: No edema.   Lymphadenopathy:      Cervical: No cervical adenopathy.   Skin:     General: Skin is warm and dry.      Coloration: Skin is not jaundiced.   Neurological:      General: No focal deficit present.      Mental Status: She is alert and oriented to person, place, and time. Mental status is at baseline.   Psychiatric:         Attention and Perception: Attention normal.         Mood and Affect: Mood normal.         Behavior: Behavior normal. Behavior is cooperative.         Cognition and Memory: Cognition normal.             ASSESSMENT/PLAN:   1. Well adult exam  Discussed preventative screenings  - Pap 9/2022  - recent labs reviewed  - encouraged to continue diet/exercise for overall wellness  - follow-up with eye doctor annually  - follow-up with dentist every 6 months  - return yearly for physicals  - annual flu shot  - tetanus booster every 10yrs    2. Chronic pain of both knees  - provided with gentle stretching/strengthening exercises  - encouraged to increase hydration and rest as able  - Tylenol/NSAIDs as needed  - discussed role of PT, weight loss  - follow-up with PMR to also discuss chronic low back pain  - to call with any questions or concerns    Pt verbalized understanding and agrees with plan.      No orders of the defined types were placed in this encounter.      Meds This Visit:  Requested Prescriptions      No prescriptions requested or ordered in this encounter       Imaging & Referrals:  None          ID#[id number]

## 2024-01-29 NOTE — PROGRESS NOTES
HPI:    Patient ID: Dilcia Garcia is a 33 year old female.    HPI  Pt presenting for Pap testing.    Reports new partner  Requesting STI screening  Notes mild vaginal irritation  Denies dyspareunia, discharge, dysuria      Review of Systems   A comprehensive 10 point review of systems was completed.  Pertinent positives and negatives noted in the the HPI.       Current Outpatient Medications   Medication Sig Dispense Refill    hydroCHLOROthiazide 12.5 MG Oral Tab Take 1 tablet (12.5 mg total) by mouth daily. 90 tablet 3    Clindamycin Phosphate 1 % External Gel Apply 1 Application topically nightly. 60 g 3    Phentermine HCl 37.5 MG Oral Cap Take 1 capsule (37.5 mg total) by mouth every morning. 30 capsule 2    ibuprofen 800 MG Oral Tab Take 1 tablet (800 mg total) by mouth every 6 (six) hours as needed for Pain.       Allergies:  Allergies   Allergen Reactions    Povidone Iodine RASH      Vitals:    01/29/24 1511   BP: 115/74   Pulse: 71   Temp: 98 °F (36.7 °C)   TempSrc: Temporal   SpO2: 99%   Weight: 260 lb 6.4 oz (118.1 kg)   Height: 5' 4\" (1.626 m)       Body mass index is 44.7 kg/m².   PHYSICAL EXAM:   Physical Exam  Vitals reviewed.   Constitutional:       General: She is not in acute distress.  Eyes:      Conjunctiva/sclera: Conjunctivae normal.   Abdominal:      General: Bowel sounds are normal.      Palpations: Abdomen is soft.   Genitourinary:     General: Normal vulva.      Exam position: Lithotomy position.      Vagina: Vaginal discharge present.      Cervix: Normal.      Uterus: Not tender.       Adnexa:         Right: No tenderness.          Left: No tenderness.     Neurological:      Mental Status: She is alert.             ASSESSMENT/PLAN:   1. Screening for cervical cancer  - ThinPrep PAP Smear; Future  - Hpv Dna  High Risk , Thin Prep Collect; Future  - ThinPrep PAP Smear  - Hpv Dna  High Risk , Thin Prep Collect    2. Routine screening for STI (sexually transmitted infection)  - will check STI  panel  - discussed safe sex practices    3. Vaginal irritation  - will check vaginosis swab  - continue gentle hygiene  - Vaginitis Vaginosis PCR Panel; Future  - Chlamydia/Gc Amplification; Future  - Hepatitis Panel, Acute (4); Future  - T Pallidum Screening [place]; Future  - HIV Ag/Ab Combo; Future  - Vaginitis Vaginosis PCR Panel  - Chlamydia/Gc Amplification    Pt verbalized understanding and agrees with plan.    Orders Placed This Encounter   Procedures    Hepatitis Panel, Acute (4)    T Pallidum Screening [place]    HIV Ag/Ab Combo    Hpv Dna  High Risk , Thin Prep Collect    ThinPrep PAP Smear    Vaginitis Vaginosis PCR Panel    Chlamydia/Gc Amplification       Meds This Visit:  Requested Prescriptions      No prescriptions requested or ordered in this encounter       Imaging & Referrals:  None         ID#[id number]

## 2024-01-30 NOTE — PROGRESS NOTES
[facility], [facility], [place]  [address]  [facility] [address]  Dept: [phone]             Patient:  Dilcia Garcia  :      1990  MRN:      [id number]    Chief Complaint:    Chief Complaint   Patient presents with    Follow - Up    Weight Management       SUBJECTIVE     History of Present Illness:  Dilcia is being seen today for a follow-up for pre-surgical weight loss management    Past Medical History:   Past Medical History:   Diagnosis Date    Abnormal Pap smear of cervix     HPV+. Colpo-2013.    Chlamydia 2016    COVID-19 affecting pregnancy in second trimester 2020    Positive test 2020. Did not take Lovenox.     Decorative tattoo     Genital herpes simplex     Gestational diabetes mellitus (GDM) requiring insulin 2021    Insulin    Gonorrhea 2014    treated    Heart murmur     Had a heart murmur when she was born until 10 years.  Does not take any medicine.    History of chicken pox     History of  delivery     26-28 wk. Delivered for severe preeclampsia, IUGR.     History of prior pregnancy with IUGR      Hypertension     Had pre clampsia during pregnancy and pt was induced to have her baby.     Low back pain during pregnancy in first trimester 2020    Hospitalized with severe low back pain in 1st trimester of pregnancy. Could not walk without morphine. Physical therapy.     Migraine 2018    Morbid obesity with BMI of 45.0-49.9, adult (HCC) 2020    Pre-preg BMI 47.5     Obesity     ADNIA on CPAP 10/26/2017    Periodic headache syndrome, not intractable 2018    Preeclampsia, third trimester 2021    BP elevated at 28+ wk. Elevated urine protein 814 mg at 29w0d (3/19/2021). (3/18/2021) NEGATIVE lupus anticoagulant, beta 2 glycoprotein Ab, anticardiolipin.      Primary osteoarthritis of left knee 10/26/2017    Severe preeclampsia     Delivered at 28 wk at [place]          Comorbidities:  Sleep apnea-Improvement?  yes    OBJECTIVE     Vitals: /80 (BP Location: Left arm, Patient Position: Sitting, Cuff Size: adult)   Pulse 100   Ht 5' 4\" (1.626 m)   Wt 258 lb (117 kg)   LMP 01/21/2024 (Exact Date)   SpO2 98%   BMI 44.29 kg/m²     Initial weight loss:-02   Total weight loss:+17   Start weight: 241    Wt Readings from Last 3 Encounters:   01/30/24 258 lb (117 kg)   01/29/24 260 lb 6.4 oz (118.1 kg)   01/05/24 256 lb 6.4 oz (116.3 kg)       Patient Medications:    Current Outpatient Medications   Medication Sig Dispense Refill    Tirzepatide-Weight Management (ZEPBOUND) 2.5 MG/0.5ML Subcutaneous Solution Auto-injector Inject 2.5 mg into the skin once a week. 3 mL 2    hydroCHLOROthiazide 12.5 MG Oral Tab Take 1 tablet (12.5 mg total) by mouth daily. 90 tablet 3    Clindamycin Phosphate 1 % External Gel Apply 1 Application topically nightly. 60 g 3    Phentermine HCl 37.5 MG Oral Cap Take 1 capsule (37.5 mg total) by mouth every morning. 30 capsule 2    ibuprofen 800 MG Oral Tab Take 1 tablet (800 mg total) by mouth every 6 (six) hours as needed for Pain.       Allergies:  Povidone iodine     Social History:    Social History     Socioeconomic History    Marital status:      Spouse name: Not on file    Number of children: Not on file    Years of education: Not on file    Highest education level: Not on file   Occupational History    Not on file   Tobacco Use    Smoking status: Never    Smokeless tobacco: Never   Vaping Use    Vaping Use: Never used   Substance and Sexual Activity    Alcohol use: Not Currently     Alcohol/week: 0.0 standard drinks of alcohol     Comment: SOCIALLY    Drug use: No    Sexual activity: Not Currently     Partners: Male   Other Topics Concern    Caffeine Concern No    Exercise Yes    Seat Belt Not Asked    Special Diet No    Stress Concern Not Asked    Weight Concern Yes    Grew up on a farm Not Asked    History of tanning Not Asked     Outdoor occupation Not Asked    Pt has a pacemaker No    Pt has a defibrillator No    Breast feeding Not Asked    Reaction to local anesthetic No   Social History Narrative    The patient does not use an assistive device..      The patient does  live in a home with stairs.     Social Determinants of Health     Financial Resource Strain: Not on file   Food Insecurity: Not on file   Transportation Needs: Not on file   Physical Activity: Not on file   Stress: Not on file   Social Connections: Not on file   Housing Stability: Not on file     Surgical History:    Past Surgical History:   Procedure Laterality Date           at 28 wk for severe pre-eclampsia - [facility]       2014      2021    Repeat LTCS at 32w0d - oligohydramnios, decreased fetal movement, preeclampsia with severe features Dr. Emi Benson, [facility].     COLPOSCOPY, CERVIX W/UPPER ADJACENT VAGINA; W/BIOPSY(S), CERVIX  2013     Family History:  No family history on file.    Food Journal  Reviewed and Discussed:       Patient has a Food Journal?: no   Patient is reading nutrition labels?  yes  Average Caloric Intake:   Unknown  Average CHO Intake: ~100  Is patient exercising? yes  Type of exercise? ADLs    Eating Habits  Patient states the following:  Eats 3 meal(s) per day  Length of time it takes to consume a meal:  20 min  # of snacks per day: 1 Type of snacks:  Cereal, fruit  Amount of soda consumption per day:    Amount of water (in ounces) per day:  48oz  Drinking between meals only:  yes  Toughest challenge:  Exercise, cutting out carbs, ice cream cravings    Nutritional Goals  Limit carbohydrates to 100 gms per day, Eat 100-200 calories within 1 hour of waking  and Eat 3-4 cups of fresh fruits or vegetables daily    Behavior Modifications Reviewed and Discussed  Eat breakfast, Eat 3 meals per day, Plan meals in advance, Read nutrition labels, Drink 64 oz of water per day, Maintain a daily  food journal, No drinking 30 minutes before or after meals, Utlize portion control strategies to reduce calorie intake, Identify triggers for eating and manage cues and Eat slowly and take 20 to 30 minutes to complete each meal    Exercise Goals Reviewed and Discussed    Walk for  30 Minutes    ROS:    Review of Systems   Constitutional: Negative.  Negative for activity change, appetite change, chills and fatigue.   HENT: Negative.     Respiratory: Negative.  Negative for cough, shortness of breath and wheezing.    Cardiovascular: Negative.  Negative for chest pain, palpitations and leg swelling.   Gastrointestinal: Negative.  Negative for abdominal distention, abdominal pain, diarrhea, nausea and vomiting.   Genitourinary: Negative.  Negative for difficulty urinating, dysuria and frequency.   Musculoskeletal: Negative.  Negative for arthralgias, back pain and gait problem.   Skin: Negative.    Neurological: Negative.    Psychiatric/Behavioral: Negative.         Physical Exam:     Physical Exam   Constitutional: She is oriented to person, place, and time. She appears well-developed and well-nourished.   HENT:   Head: Normocephalic and atraumatic.   Neck: Neck supple.   Pulmonary/Chest: Effort normal.   Abdominal: Soft.   Musculoskeletal: Normal range of motion.   Neurological: She is alert and oriented to person, place, and time.   Skin: Skin is warm and dry.   Psychiatric: She has a normal mood and affect. Her behavior is normal. Judgment and thought content normal.   Vitals reviewed.    ASSESSMENT     OBSTRUCTIVE SLEEP APNEA: The patient states her sleep apnea has been stable since the last clinic visit. There has not been any increase in hyper-somnolence.     Encounter Diagnosis(ses):   Encounter Diagnoses   Name Primary?    Essential hypertension Yes    DANIA on CPAP     Encounter for therapeutic drug monitoring     Morbid obesity with BMI of 40.0-44.9, adult (HCC)        PLAN   Discussed with patient the risks  and benefits of  VSG. The patient is interested in bariatric surgery and will continue our presurgical process.       Goals for next month:  1. Keep a food log.  2. Drink 48-64 ounces of non-caloric beverages per day. No fruit juices or regular soda.  3. Increase activity-upper body exercises, walk 10 minutes per day.  4. Increase fruit and vegetable servings to 5-6 per day.       OBSTRUCTIVE SLEEP APNEA: The patient states her sleep apnea has been stable since the last clinic visit. There has not been any increase in hyper-somnolence.     Should follow up with surgical team    Tolerating Phentermine  Refill at 37.5 mg  Needs new ekg    Start Zepbound    Refer back to Dr Larose  Refer back to RD        States she has a hard time with time off from work to schedule surgery      No orders of the defined types were placed in this encounter.               Joe Elliott MD

## 2024-02-16 NOTE — ED PROVIDER NOTES
Chief Complaint   Patient presents with    Knee Pain       HPI:     Dilcia Garcia is a 34 year old female who presents for evaluation of right knee pain onset yesterday.  Patient notes history of osteo arthritis of the right knee currently seeing orthopedic with neck scheduled appointment within 10 days.  Patient states was breaking up children when she felt a tweak to her right knee while pivoting.  Notes pain along the posterior knee, worse with ambulation and full extension.  Denies any interventional history to the knee to date.  Ambulatory without assistance on arrival.  No antipyretic analgesic today, 4 out of 10.  Denies head injury headache dizziness neck pain chest pain shortness of breath abdominal pain back pain upper extremity numbness weakness or swelling.      PFSH    PFS asessment screens reviewed and agree.  Nurses notes reviewed I agree with documentation.    No family history on file.  Family history reviewed with patient/caregiver and is not pertinent to presenting problem.  Social History     Socioeconomic History    Marital status:      Spouse name: Not on file    Number of children: Not on file    Years of education: Not on file    Highest education level: Not on file   Occupational History    Not on file   Tobacco Use    Smoking status: Never    Smokeless tobacco: Never   Vaping Use    Vaping Use: Never used   Substance and Sexual Activity    Alcohol use: Not Currently     Alcohol/week: 0.0 standard drinks of alcohol     Comment: SOCIALLY    Drug use: No    Sexual activity: Not Currently     Partners: Male   Other Topics Concern    Caffeine Concern No    Exercise Yes    Seat Belt Not Asked    Special Diet No    Stress Concern Not Asked    Weight Concern Yes    Grew up on a farm Not Asked    History of tanning Not Asked    Outdoor occupation Not Asked    Pt has a pacemaker No    Pt has a defibrillator No    Breast feeding Not Asked    Reaction to local anesthetic No   Social History  Narrative    The patient does not use an assistive device..      The patient does  live in a home with stairs.     Social Determinants of Health     Financial Resource Strain: Not on file   Food Insecurity: Not on file   Transportation Needs: Not on file   Physical Activity: Not on file   Stress: Not on file   Social Connections: Not on file   Housing Stability: Not on file         ROS:   Positive for stated complaint: Knee pain.  All other systems reviewed and negative except as noted above.  Constitutional and Vital Signs Reviewed.      Physical Exam:     Findings:    /83   Pulse 87   Temp 97.7 °F (36.5 °C) (Temporal)   Resp 18   LMP 01/21/2024 (Exact Date)   SpO2 99%   GENERAL: well developed, well nourished, well hydrated, no distress  SKIN: good skin turgor, no obvious rashes  EXTREMITIES mild tenderness along the right posterior knee along the distal thigh.  No edema erythema or warmth.  No parapatellar tenderness or effusion.  No laxity along the patella.  Normal straight leg raise.  Normal anterior drawer test.  DP pulse sensation intact.  Normal Jennifer test.  RINCON without difficulty  HEAD: normocephalic, atraumatic  EYES: sclera non icteric bilateral, conjunctiva clear  NEURO: No focal deficits  PSYCH: Alert and oriented x3.  Answering questions appropriately.  Mood appropriate.    MDM/Assessment/Plan:   Orders for this encounter:    Orders Placed This Encounter    Meloxicam 15 MG Oral Tab     Sig: Take 1 tablet (15 mg total) by mouth daily for 10 days.     Dispense:  10 tablet     Refill:  0       Labs performed this visit:  No results found for this or any previous visit (from the past 10 hour(s)).    MDM:  Patient agrees with supportive measures with recent x-ray imaging on record, declining further x-rays for reassessment.  Agrees with supportive measures including compression requesting retail Nice sleeve versus Ace wrap during encounter.  Will provide prescription NSAID by discussion with  blood work from last year showing normal renal function.  Patient uses exclusively for supportive pain over the next few days and readdress this through orthopedic as scheduled.  Wells criteria not warranting DVT rule out    Diagnosis:    ICD-10-CM    1. Hx of osteoarthritis  Z87.39       2. Strain of right knee, initial encounter  S86.911A           All results reviewed and discussed with patient.  See AVS for detailed discharge instructions for your condition today.    Follow Up with:  Davey Bob MD  [address] [place] [address]  [phone]    Schedule an appointment as soon as possible for a visit in 1 week  FOLLOW UP AS SCHEDULED.

## 2024-02-28 NOTE — PROGRESS NOTES
Oriented pt to the bariatric program; provided/reviewed bariatric packet of info, time line, referrals, etc; pt agreed and verbalized understanding; attended seminar on 2/19/24; pt restarting the pre-op process.

## 2024-02-28 NOTE — H&P
[facility], [facility], [place]  [address]  [address]  [facility] [address]  Dept: [phone]    2024    Bariatric Patient Follow-up Evaluation    Chief Complaint:  Morbid obesity     History of Present Illness:  Dilcia Garcia is a 34 year old-female presenting for surgical follow-up.  Today she weighs 260 pounds which is 12 less than last visit.  She was previously scheduled for surgery in July then canceled again citing work responsibilities but neglected to reschedule.  She states her life was very unstable and was not ready to consider surgery again until now.  She has been on NSAIDs and steroids recently for knee pain.  Other than that no major health changes.  She is still pursuing sleeve gastrectomy.    Past Medical History:    Past Medical History:   Diagnosis Date    Abnormal Pap smear of cervix     HPV+. Colpo-2013.    Chlamydia 2016    COVID-19 affecting pregnancy in second trimester ([facility]) 2020    Positive test 2020. Did not take Lovenox.     Decorative tattoo     Genital herpes simplex     Gestational diabetes mellitus (GDM) requiring insulin ([facility]) 2021    Insulin    Gonorrhea 2014    treated    Heart murmur     Had a heart murmur when she was born until 10 years.  Does not take any medicine.    History of chicken pox     History of  delivery     26-28 wk. Delivered for severe preeclampsia, IUGR.     History of prior pregnancy with IUGR      Hypertension     Had pre clampsia during pregnancy and pt was induced to have her baby.     Low back pain during pregnancy in first trimester ([facility]) 2020    Hospitalized with severe low back pain in 1st trimester of pregnancy. Could not walk without morphine. Physical therapy.     Migraine 2018    Morbid obesity with BMI of 45.0-49.9, adult ([facility]) 2020    Pre-preg BMI 47.5     Obesity     DANIA on CPAP 10/26/2017    Periodic headache syndrome, not  intractable 2018    Preeclampsia, third trimester (HCC) 2021    BP elevated at 28+ wk. Elevated urine protein 814 mg at 29w0d (3/19/2021). (3/18/2021) NEGATIVE lupus anticoagulant, beta 2 glycoprotein Ab, anticardiolipin.      Primary osteoarthritis of left knee 10/26/2017    Severe preeclampsia (HCC)     Delivered at 28 wk at [facility]        Past Surgical History:    Past Surgical History:   Procedure Laterality Date           at 28 wk for severe pre-eclampsia - [facility]       2014      2021    Repeat LTCS at 32w0d - oligohydramnios, decreased fetal movement, preeclampsia with severe features Dr. Emi Benson, [facility].     COLPOSCOPY, CERVIX W/UPPER ADJACENT VAGINA; W/BIOPSY(S), CERVIX  2013     Childhood umbilical hernia repair, no mesh reported    Family History:  Grandparents had DM and lung cancer (smokers)    Social History:    Social History     Socioeconomic History    Marital status:    Tobacco Use    Smoking status: Never    Smokeless tobacco: Never   Vaping Use    Vaping Use: Never used   Substance and Sexual Activity    Alcohol use: Not Currently     Alcohol/week: 0.0 standard drinks of alcohol     Comment: SOCIALLY    Drug use: No    Sexual activity: Not Currently     Partners: Male   Other Topics Concern    Caffeine Concern No    Exercise Yes    Special Diet No    Weight Concern Yes    Pt has a pacemaker No    Pt has a defibrillator No    Reaction to local anesthetic No       Medications:   Current Outpatient Medications:     Meloxicam 15 MG Oral Tab, Take 1 tablet (15 mg total) by mouth daily., Disp: 30 tablet, Rfl: 0    metRONIDAZOLE (FLAGYL) 500 MG Oral Tab, Take 1 tablet (500 mg total) by mouth in the morning and 1 tablet (500 mg total) before bedtime., Disp: 14 tablet, Rfl: 0    Phentermine HCl 37.5 MG Oral Cap, Take 1 capsule (37.5 mg total) by mouth every morning., Disp: 30 capsule, Rfl: 2    Tirzepatide-Weight  Management (ZEPBOUND) 2.5 MG/0.5ML Subcutaneous Solution Auto-injector, Inject 2.5 mg into the skin once a week., Disp: 3 mL, Rfl: 2    hydroCHLOROthiazide 12.5 MG Oral Tab, Take 1 tablet (12.5 mg total) by mouth daily., Disp: 90 tablet, Rfl: 3    Clindamycin Phosphate 1 % External Gel, Apply 1 Application topically nightly., Disp: 60 g, Rfl: 3    ibuprofen 800 MG Oral Tab, Take 1 tablet (800 mg total) by mouth every 6 (six) hours as needed for Pain., Disp: , Rfl:      Allergies:    Allergies   Allergen Reactions    Povidone Iodine RASH       ROS:          Constitutional: negative  HEENT: negative  Respiratory: negative  Cardiovascular: negative  Gastrointestinal: negative  Genitourinary: negative  Musculoskeletal: As per HPI  Neurological: negative  Psychological: negative  Endocrine: negative  Immunologic: negative  Integumentary: negative        Physical Exam:  Vital Signs:  /72   Pulse 83   Ht 5' 4\" (1.626 m)   Wt 259 lb 12.8 oz (117.8 kg)   LMP 2024 (Exact Date)   SpO2 98%   BMI 44.59 kg/m²   BMI:  Body mass index is 44.59 kg/m².  General: no acute distress, well-nourished  HENT: normocephalic, atraumatic  Lung: breathing comfortably, symmetrical expansion, clear to ausculation bilaterally, no wheezing  Heart: regular rate and rhythm  Abdomen: soft, obese, non-tender, non-distended, no hernia/mass/organomegaly, prior pfannenstiel and umbilical incision from childhood hernia repair, some laxity there but no definite recurrence or incisional hernia  Extremities: normal strength, normal ROM, walks without assist device  Neuro: no focal deficits, cranial nerves grossly intact  Psych: alert and oriented, normal affect  Skin: warm, dry    Assessment: 34 year old  female with chronic morbid obesity, third restart, pursuing sleeve gastrectomy.  All of her previous clearances have .  Although she has canceled surgery with me twice she chooses to only remember 1 of those instances.      Plan:      The patient appears to be a possible candidate for bariatric surgery.  This being her third restart her path to surgery is fairly narrow and any noncompliance at this point will certainly be difficult to overcome.  She will be referred for routine pre-operative cardiac, pulmonary, nutrition, and psychology evaluations.  Although the patient is most interested in sleeve gastrectomy which gets her to within about 10 pounds of her goal weight and is safer in case she needs to go back on steroids or NSAIDs, final decision regarding surgery will not be made until the above clearances are completed.    Pedro Larose MD, 02/28/24, 10:54 AM
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
## 2024-03-20 NOTE — PATIENT INSTRUCTIONS
Goals:     1.  Continue to Keep a food record, My Net Diary, select the macros dashboard or My Fitness Pal.  2.  Add a snack or 2 during the day.  For example, peanuts, hard boiled eggs, protein shake/bar, protein smoothie, lunch meat with broccoli, carrots, cucumbers, pineapple, cassie, watermelon, berries. Strive to consume at least 4-6 meals/snacks per day.  Include protein and produce when you eat.  Aim for 55-66 grams of protein per day or 20-25% of total calories.  Try to keep the carbohydrates at 120 grams per day or less.  Try the recipes for getting in protein with produce.  3.  Practice eating strategies, eat separately from drinking, avoid straws, chew food 20-30 times before swallowing.  Make the meals last 30 minutes.  4.  Continue to drink at least aim for 64 oz per day of water.  (Try  Protein water, adding True Lemon, Crystal Light).  5.  Taper caffeine and sugary drinks.  6.  Continue to exercise with a goal of 30 minutes per day for exercise (for example,walking).    7.  Add some strength training  10 minutes 3 days per week.   (7 minute workout song on YouTube)

## 2024-03-20 NOTE — PROGRESS NOTES
[facility]  [address]  [place] [address]  Dept: [phone]  Loc: [phone]    24    Bariatric Initial Nutrition Assessment    Dilcia Garcia is a 34 year old female.    Referring Physician: Pedro Larose  Reason for MNT Referral: Initial assessment for: Vertical Sleeve Gastrectomy      Assessment   Medical Diagnosis:  obesity grade III, HTN, DANIA on Cpap    Patient Active Problem List   Diagnosis    Morbid obesity with BMI of 40.0-44.9, adult ([facility])    DANIA on CPAP    Encounter for therapeutic drug monitoring    Periodic headache syndrome, not intractable    Essential hypertension    Increased appetite    Severe pre-eclampsia, with delivery ([facility])    Lumbar sprain    Tenosynovitis, de Quervain    Vitamin D deficiency    'Light-for-dates' infant with signs of fetal malnutrition ([facility])    Lumbar back pain       Past Medical History:   Past Medical History:   Diagnosis Date    Abnormal Pap smear of cervix     HPV+. Colpo-2013.    Chlamydia 2016    COVID-19 affecting pregnancy in second trimester ([facility]) 2020    Positive test 2020. Did not take Lovenox.     Decorative tattoo     Genital herpes simplex     Gestational diabetes mellitus (GDM) requiring insulin ([facility]) 2021    Insulin    Gonorrhea 2014    treated    Heart murmur     Had a heart murmur when she was born until 10 years.  Does not take any medicine.    History of chicken pox     History of  delivery     26-28 wk. Delivered for severe preeclampsia, IUGR.     History of prior pregnancy with IUGR      Hypertension     Had pre clampsia during pregnancy and pt was induced to have her baby.     Low back pain during pregnancy in first trimester ([facility]) 2020    Hospitalized with severe low back pain in 1st trimester of pregnancy. Could not walk without morphine. Physical therapy.     Migraine 2018    Morbid obesity with BMI of 45.0-49.9,  adult ([facility]) 11/23/2020    Pre-preg BMI 47.5     Obesity     DANIA on CPAP 10/26/2017    Periodic headache syndrome, not intractable 06/26/2018    Preeclampsia, third trimester ([facility]) 03/17/2021    BP elevated at 28+ wk. Elevated urine protein 814 mg at 29w0d (3/19/2021). (3/18/2021) NEGATIVE lupus anticoagulant, beta 2 glycoprotein Ab, anticardiolipin.      Primary osteoarthritis of left knee 10/26/2017    Severe preeclampsia ([facility]) 2007    Delivered at 28 wk at [place]        Weight history:  Struggled with weight  most of adult life, Pt's highest adult weight 280 lbs at 32 y/o, Pt's lowest adult weight 220 lbs at 29 y/o, and Reason given for weight gain:  Struggled with weight most of his/her life, Poor food choices, and Pregnancy: 3 times pregnant gained weight with each    Patient has tried: Low carbohydrate, Calorie counting : on own, and Other: IF, surgical track of program    Patient's most successful weight loss attempt was 2018. Lost 50 lbs  and kept it off 18 months.    Patient has tried these medications for weight loss: Ionamin/Adipex/Phentermine    Patient has received these behavioral treatments for weight loss: None    Patient is being followed by Dr. Elliott for medical weight loss.    Patient has completed medical weight management No    Patient has support from: Family, Primary care physician, and Friends    Patient acknowledges binge eating behavior: does not identify    Patient engages in binge-purge behavior: no    Patient uses laxatives or vomits as a means of purging: no    Patient complains of:  none    Patient's motivation for bariatric surgery: \"I have arthritis in knees, to get off weight on knees and to prevent diabetes in the future\".  Allergies:  Allergies   Allergen Reactions    Povidone Iodine RASH       Height:    Ht Readings from Last 1 Encounters:   03/20/24 5' 4\" (1.626 m)       Weight:    Wt Readings from Last 6 Encounters:   03/20/24 265 lb 4.8 oz (120.3 kg)   02/28/24 259 lb 12.8 oz  (117.8 kg)   02/28/24 257 lb (116.6 kg)   01/30/24 258 lb (117 kg)   01/29/24 260 lb 6.4 oz (118.1 kg)   01/05/24 256 lb 6.4 oz (116.3 kg)       IBW:  Ideal body weight: 54.7 kg (120 lb 9.5 oz)  BMI: Body mass index is 45.54 kg/m². Obesity Grade III, greater than or equal to 40    Diet Rx: calorie reduction    Labs:    Lab Results   Component Value Date    GLU 67 (L) 03/08/2024    BUN 13 03/08/2024    BUNCREA 13.8 03/08/2024    CREATSERUM 0.94 03/08/2024    ANIONGAP 6 03/08/2024    GFRNAA 88 12/09/2021    GFRAA 101 12/09/2021    CA 9.7 03/08/2024    OSMOCALC 290 03/08/2024    ALKPHO 68 03/08/2024    AST 15 03/08/2024    ALT 10 03/08/2024    BILT 0.6 03/08/2024    TP 7.8 03/08/2024    ALB 4.4 03/08/2024    GLOBULIN 3.4 03/08/2024     03/08/2024    K 3.7 03/08/2024     03/08/2024    CO2 28.0 03/08/2024     Lab Results   Component Value Date    MG 5.8 (HH) 04/10/2021     Phosphorus (mg/dL)   Date Value   09/09/2020 2.4 (L)   04/18/2018 3.1       Thyroid:    Lab Results   Component Value Date    TSH 0.958 03/08/2024    TSH 1.020 08/17/2022    TSH 0.798 12/09/2021       Iron Panel:   Lab Results   Component Value Date    IRON 60 03/08/2024    TRANSFERRIN 243 (L) 03/08/2024    TIBCP 362 03/08/2024    SAT 17 03/08/2024       Diabetes:    Lab Results   Component Value Date     03/08/2024    A1C 5.4 03/08/2024       Lipid Panel:   Lab Results   Component Value Date    CHOLEST 195 03/08/2024    TRIG 85 03/08/2024    HDL 51 03/08/2024     (H) 03/08/2024    VLDL 15 03/08/2024    NONHDLC 144 (H) 03/08/2024       Vitamins/Minerals:  Lab Results   Component Value Date    B12 780 03/08/2024     Lab Results   Component Value Date    VITD 23.0 (L) 03/08/2024     Lab Results   Component Value Date/Time    THIAMINE 109 08/17/2022 08:17 AM      Lab Results   Component Value Date/Time    VITB1 109.2 03/08/2024 11:35 AM     Lab Results   Component Value Date/Time    FOLIC 20.6 03/08/2024 11:35 AM       Relevant  Meds:      Current Outpatient Medications:     ergocalciferol 1.25 MG (34736 UT) Oral Cap, Take 1 capsule (50,000 Units total) by mouth once a week., Disp: 4 capsule, Rfl: 2    Meloxicam 15 MG Oral Tab, Take 1 tablet (15 mg total) by mouth daily., Disp: 30 tablet, Rfl: 0    metRONIDAZOLE (FLAGYL) 500 MG Oral Tab, Take 1 tablet (500 mg total) by mouth in the morning and 1 tablet (500 mg total) before bedtime., Disp: 14 tablet, Rfl: 0    Phentermine HCl 37.5 MG Oral Cap, Take 1 capsule (37.5 mg total) by mouth every morning., Disp: 30 capsule, Rfl: 2    Tirzepatide-Weight Management (ZEPBOUND) 2.5 MG/0.5ML Subcutaneous Solution Auto-injector, Inject 2.5 mg into the skin once a week., Disp: 3 mL, Rfl: 2    hydroCHLOROthiazide 12.5 MG Oral Tab, Take 1 tablet (12.5 mg total) by mouth daily., Disp: 90 tablet, Rfl: 3    Clindamycin Phosphate 1 % External Gel, Apply 1 Application topically nightly., Disp: 60 g, Rfl: 3    ibuprofen 800 MG Oral Tab, Take 1 tablet (800 mg total) by mouth every 6 (six) hours as needed for Pain., Disp: , Rfl:     Vitamins/Minerals: Vit D  Food Intolerances: Lactose  Food Allergies:  nkfa  Smoker:     History   Smoking Status    Never   Smokeless Tobacco    Never       Alcohol Intake:    History   Alcohol Use Not Currently     Comment: SOCIALLY       Drugs:    History   Drug Use No       Estimated Kcal Needs ([facility]): 1800 kcal/day   Estimated Protein Needs:  55-65 grams/day  Estimated Fluid Needs: Aim for 64 ozs per day    Diet history:       Breakfast      AM Snack       Lunch     PM Snack     Dinner Evening Snack   Hb eggs 4 or 2 scr eggs and turkey sausage or link or cardona or oatmeal packet with cinnamon and sugar skip Chicken Chipotle bowl or baked chicken breast and broccoli or string beans and salad skip Salad or chicken or lunch leftovers or chicken baked and veggies            skip   Meal pattern consists of 3 meals, 0 snacks and 0 protein supplements.    Total Kcal:  800  Excessive in: nothing  Inadequate in:  nothing   Trigger Foods: ice cream  Patient currently consumes caffeine: 1-3 times/week  Patient currently consumes soda: none    Activity Level: active  Type: walk  Duration: 45-60 minutes  Frequency: per day    Other: Met with pt for initial visit due to restart in bariatric program.  Pt weight is up 5.5 lbssince meeting with Dr. Larose on 2/28/24.  Per pt is being followed by Dr. Elliott for medical weight management.  Per pt is pursuing VSG with Dr. Larose for the third time.  Per pt is keeping a food record in [facility] since March and is consuming 800 calories, 75 gm protein and 108 gm of carbs per day. Discussed with pt adding a snack or two per day to include more protein and produce.  Provided ideas and recipes.  Pt verbalized understanding. Per pt is waiting 1 minute after eating to drink, per pt is chewing food well before eating, is eating slowly taking 1 hour to eat meals. Per pt is drinking over 110 oz per day of water. Per pt is drinking 12 oz of regular coffee with cream and sugar per day.  Per pt is drinking  lemonade 1 cup twice per week.  Discussed practicing waiting 30 minutes after eating to drink and tapering caffeine and sugary drinks.  Pt verbalized understanding. Per pt is active walking 45-60 minutes pe day.  Per pt is currently not doing any strength training. Discussed ideas for adding in small increments.  Pt verbalized understanding.  Per pt has program binder at home.  Pt provided additional pages of binder added regarding possible complications of bariatric surgery.  Pt with follow up visit scheduled for next month.        Nutrition Diagnosis: Morbid obesity: Improvement shown    Intervention     1.  Nutrition Rx:  Reviewed  3 meal plan, Discussed portion control, Reviewed Bariatric Diet Stages 1-4, Discussed goals, and Obtain MVI  2.  Coordination of care: attend support group prior to surgery; reminder for importance of multidisciplinary  consultations.  3.  Materials given: Goals Handout and pages 86-91 portion of Bariatric Binder    Goals:     1.  Continue to Keep a food record, My Net Diary, select the macros dashboard or My Fitness Pal.  2.  Add a snack or 2 during the day.  For example, peanuts, hard boiled eggs, protein shake/bar, protein smoothie, lunch meat with broccoli, carrots, cucumbers, pineapple, cassie, watermelon, berries. Strive to consume at least 4-6 meals/snacks per day.  Include protein and produce when you eat.  Aim for 55-66 grams of protein per day or 20-25% of total calories.  Try to keep the carbohydrates at 120 grams per day or less.  Try the recipes for getting in protein with produce.  3.  Practice eating strategies, eat separately from drinking, avoid straws, chew food 20-30 times before swallowing.  Make the meals last 30 minutes.  4.  Continue to drink at least aim for 64 oz per day of water.  (Try  Protein water, adding True Lemon, Crystal Light).  5.  Taper caffeine and sugary drinks.  6.  Continue to exercise with a goal of 30 minutes per day for exercise (for example,walking).    7.  Add some strength training  10 minutes 3 days per week.   (7 minute workout song on YouTube)    Monitor/Evaluate     Food/fluid intake/choices  Nutrition Rx  Anthropometric measurements  Body composition-InBody Testing at next appointment per surgeon recommendation  Updated labs  F/U MD plan of care  F/U to reinforce goals  F/U on vitamin/mineral supplementation  Review quizzes   for liquid protein diet prior to surgery      Additional RD visits required to review concepts? yes  Patient understands protein requirements? yes  Patient understand fluid requirements (amount and method of intake)? yes  Patient understands post-operative diet? yes  Patient ready for Liquid Protein Education? No    Nicole Alan RD, LDN  Face-to-face time spent with pt: 60 minutes

## 2024-04-22 NOTE — PROGRESS NOTES
[facility]  [address]  [facility] [address]  Dept: [phone]  Loc: [phone]    04/22/24      Bariatric Follow-up Nutrition Session    Dilcia Garcia is a 34 year old female.     Assessment     Procedure:  Vertical Sleeve Gastrectomy  Here for medical weight management: yes  Revision:  n/a  Surgery Date:  tbd  Medical Diagnosis:  obesity grade III, HTN, DANIA on Cpap    Labs:  Lab Results   Component Value Date    GLU 67 (L) 03/08/2024    BUN 13 03/08/2024    BUNCREA 13.8 03/08/2024    CREATSERUM 0.94 03/08/2024    ANIONGAP 6 03/08/2024    GFRNAA 88 12/09/2021    GFRAA 101 12/09/2021    CA 9.7 03/08/2024    OSMOCALC 290 03/08/2024    ALKPHO 68 03/08/2024    AST 15 03/08/2024    ALT 10 03/08/2024    BILT 0.6 03/08/2024    TP 7.8 03/08/2024    ALB 4.4 03/08/2024    GLOBULIN 3.4 03/08/2024     03/08/2024    K 3.7 03/08/2024     03/08/2024    CO2 28.0 03/08/2024     Lab Results   Component Value Date    MG 5.8 (HH) 04/10/2021      Phosphorus (mg/dL)   Date Value   09/09/2020 2.4 (L)   04/18/2018 3.1       Thyroid:    Lab Results   Component Value Date    TSH 0.958 03/08/2024    TSH 1.020 08/17/2022    TSH 0.798 12/09/2021       Iron Panel:  Lab Results   Component Value Date    IRON 60 03/08/2024    TRANSFERRIN 243 (L) 03/08/2024    TIBCP 362 03/08/2024    SAT 17 03/08/2024       CBC:  Lab Results   Component Value Date    WBC 5.5 03/08/2024    WBC 4.7 08/25/2023    WBC 6.3 08/17/2022     Lab Results   Component Value Date    HGB 13.3 03/08/2024    HGB 13.2 08/25/2023    HGB 13.3 08/17/2022      Lab Results   Component Value Date    .0 03/08/2024    .0 08/25/2023    .0 08/17/2022       Diabetes:    Lab Results   Component Value Date     03/08/2024    A1C 5.4 03/08/2024       Lipid Panel:  Lab Results   Component Value Date    CHOLEST 195 03/08/2024    TRIG 85 03/08/2024    HDL 51 03/08/2024     (H)  03/08/2024    VLDL 15 03/08/2024    NONHDLC 144 (H) 03/08/2024        Vitamins/Minerals:  Lab Results   Component Value Date    B12 780 03/08/2024     Lab Results   Component Value Date    VITD 23.0 (L) 03/08/2024     Lab Results   Component Value Date/Time    THIAMINE 109 08/17/2022 08:17 AM      Lab Results   Component Value Date/Time    VITB1 109.2 03/08/2024 11:35 AM     Lab Results   Component Value Date/Time    FOLIC 20.6 03/08/2024 11:35 AM        Meds:     Current Outpatient Medications:     ergocalciferol 1.25 MG (63834 UT) Oral Cap, Take 1 capsule (50,000 Units total) by mouth once a week., Disp: 4 capsule, Rfl: 2    Meloxicam 15 MG Oral Tab, Take 1 tablet (15 mg total) by mouth daily., Disp: 30 tablet, Rfl: 0    metRONIDAZOLE (FLAGYL) 500 MG Oral Tab, Take 1 tablet (500 mg total) by mouth in the morning and 1 tablet (500 mg total) before bedtime., Disp: 14 tablet, Rfl: 0    Phentermine HCl 37.5 MG Oral Cap, Take 1 capsule (37.5 mg total) by mouth every morning., Disp: 30 capsule, Rfl: 2    Tirzepatide-Weight Management (ZEPBOUND) 2.5 MG/0.5ML Subcutaneous Solution Auto-injector, Inject 2.5 mg into the skin once a week., Disp: 3 mL, Rfl: 2    hydroCHLOROthiazide 12.5 MG Oral Tab, Take 1 tablet (12.5 mg total) by mouth daily., Disp: 90 tablet, Rfl: 3    Clindamycin Phosphate 1 % External Gel, Apply 1 Application topically nightly., Disp: 60 g, Rfl: 3    ibuprofen 800 MG Oral Tab, Take 1 tablet (800 mg total) by mouth every 6 (six) hours as needed for Pain., Disp: , Rfl:     Height:    Ht Readings from Last 1 Encounters:   03/20/24 5' 4\" (1.626 m)     Weight:    Wt Readings from Last 6 Encounters:   03/20/24 265 lb 4.8 oz (120.3 kg)   02/28/24 259 lb 12.8 oz (117.8 kg)   02/28/24 257 lb (116.6 kg)   01/30/24 258 lb (117 kg)   01/29/24 260 lb 6.4 oz (118.1 kg)   01/05/24 256 lb 6.4 oz (116.3 kg)       Weight change:  up 1.7 lbs since LOV  Post-Op Excess Body Weight Loss: n/a% EBWL  BMI: There is no height or  weight on file to calculate BMI.    Protein Intake: 67-94 grams/day  Carbs:29-43 gms/day  Fluid intake:  over 64-80 ounces/day    Diet history:       Breakfast      AM Snack       Lunch     PM Snack     Dinner   Hb eggs 4 or 2 scr eggs and turkey sausage or link or oatmeal  with HB or chicken sausage Nuts or greek yogurt or 2 cookies Salad with chicken or Chipotle bowls or Shrimp jumbolya or turkey chili Same as am or grapes or apple Wings boneless breaded and brussel sprout or cabbage or Honey mustard chicken wrap with lettuce                Total Calories:  1150  Excessive in: nothing  Inadequate in:  nothing     Patient has made some modifications and adjustments to diet: yes, per pt is eating separately from drinking-waiting 10-15 minutes after eating to drink, is chewing food well before swallowing, eating more slowly, is eating smaller portions, is eating more protein, is eating at least 5 times per day-used to be 3 times per day,  is eating 1-2 servings of fruit per day and 4-6 servings of vegetables per day, eliminated caffeine and sugary drinks.   Food intolerances:  lactose-diarrhea, gas  Vitamin/mineral supplements:  Vit D  Protein supplements:  Other [employer]      Activity Level: active  Type: walk and other: lifting weights once per week for one hour  Duration: 45-60 minutes  Frequency: per day    Other:  Met with pt for follow up visit.  Per pt cut out red meat since LOV. Pt weight is up 1.7 lbs since LOV. Per pt is being followed by Dr. Elliott for medical weight management. Per pt is pursuing VSG with Dr. Larose for the third time. Per pt is taking phentermine 5 days per week and feels that it helps to curb hunger.  Per pt is keeping a food record in [facility] since March and is consuming 1150 calories, 67-94 gm protein and 29-43 gm of carbs per day. Per pt is eating separately from drinking-waiting 10-15 minutes after eating to drink, is chewing food well before swallowing, eating more slowly,  is eating smaller portions, is eating more protein, is eating at least 5 times per day-used to be 3 times per day,  is eating 1-2 servings of fruit per day and 4-6 servings of vegetables per day, eliminated caffeine and sugary drinks.  Discussed with pt to continue to wait about 30 minutes after eating to drink.  Pt verbalized understanding.  Reviewed quizzes.  Pt with no questions.  Pt resigned commitment for bariatric surgery.  Per pt is drinking at least 64-80 oz per day of water. Per pt is active walking 45-60 minutes pe day. Per pt is strength training about 1 hour per week.  Pt is ready for surgery from a dietary perspective.  Pt with follow up visit scheduled for next month for LPD instruction.     Nutrition Diagnosis     Nutrition Diagnosis: Morbid obesity: Improvement shown     Intervention     Recommendations/goals:    1.  Continue to keep a food record, My Net Diary, select the macros dashboard.  2.  Continue to consume snacks.  For example, peanuts, hard boiled eggs, protein shake/bar, protein smoothie, lunch meat with broccoli, carrots, cucumbers, pineapple, cassie, watermelon, berries. Strive to consume at least 4-6 meals/snacks per day.  Include protein and produce when you eat.  Aim for 60-70 grams of protein per day or 20-25% of total calories.  Try to keep the carbohydrates at 100-120 grams per day or less.  Try the recipes for getting in protein with produce.  3.  Wait 30 minutes after eating to drink.  Continue to practice eating strategies, eat separately from drinking, avoid straws, chew food 20-30 times before swallowing.  Make the meals last 30 minutes.  4.  Continue to drink at least aim for 64 oz per day of water.  (Try  Protein water, adding True Lemon, Crystal Light).  5. Continue to exercise with a goal of 30 minutes per day for exercise (for example,walking).    6.  Continue to strength train at least 10 minutes 3 days per week.   (7 minute workout song on YouTube).      Monitor/Evaluate      Anthropometric measurements, Food/fluid intake/choices, Food intolerances, Activity level, Vitamin/mineral supplementation, Reinforce goals, and Calorie/protein intake  Additional RD visits required to review concepts? yes  Patient understands protein requirements? yes  Patient understand fluid requirements (amount and method of intake)? yes  Patient understands post-operative diet? yes  Patient keeping consistent food records? yes  Patient ready for Liquid Protein Education? yes      Nicole Alan RD, LDN   Face-to-face time spent with pt: 30 minutes

## 2024-04-22 NOTE — PATIENT INSTRUCTIONS
Recommendations/goals:    1.  Continue to keep a food record, My Net Diary, select the macros dashboard.  2.  Continue to consume snacks.  For example, peanuts, hard boiled eggs, protein shake/bar, protein smoothie, lunch meat with broccoli, carrots, cucumbers, pineapple, cassie, watermelon, berries. Strive to consume at least 4-6 meals/snacks per day.  Include protein and produce when you eat.  Aim for 60-70 grams of protein per day or 20-25% of total calories.  Try to keep the carbohydrates at 100-120 grams per day or less.  Try the recipes for getting in protein with produce.  3.  Wait 30 minutes after eating to drink.  Continue to practice eating strategies, eat separately from drinking, avoid straws, chew food 20-30 times before swallowing.  Make the meals last 30 minutes.  4.  Continue to drink at least aim for 64 oz per day of water.  (Try  Protein water, adding True Lemon, Crystal Light).  5. Continue to exercise with a goal of 30 minutes per day for exercise (for example,walking).    6.  Continue to strength train at least 10 minutes 3 days per week.   (7 minute workout song on YouTube).

## 2024-04-24 NOTE — PROGRESS NOTES
[facility], [facility], [place]  [address]  [facility] [address]  Dept: [phone]    2024   Bariatric Patient Follow-up Evaluation    Chief Complaint:  Morbid obesity     History of Present Illness:  Dilcia Garcia is a 34 year old-female presenting for surgical follow-up.  Today she weighs 264 pounds which is 5 more than last visit but still down 7 from a couple visits ago.  No changes to her health since the last visit.    Past Medical History:    Past Medical History:    Abnormal Pap smear of cervix    HPV+. [place]-2013.    Chlamydia    COVID-19 affecting pregnancy in second trimester ([facility])    Positive test 2020. Did not take Lovenox.     Decorative tattoo    Genital herpes simplex    Gestational diabetes mellitus (GDM) requiring insulin ([facility])    Insulin    Gonorrhea    treated    Heart murmur    Had a heart murmur when she was born until 10 years.  Does not take any medicine.    History of chicken pox    History of  delivery    26-28 wk. Delivered for severe preeclampsia, IUGR.     History of prior pregnancy with IUGR     Hypertension    Had pre clampsia during pregnancy and pt was induced to have her baby.     Low back pain during pregnancy in first trimester ([facility])    Hospitalized with severe low back pain in 1st trimester of pregnancy. Could not walk without morphine. Physical therapy.     Migraine    Morbid obesity with BMI of 45.0-49.9, adult ([facility])    Pre-preg BMI 47.5     Obesity    DANIA on CPAP    Periodic headache syndrome, not intractable    Preeclampsia, third trimester ([facility])    BP elevated at 28+ wk. Elevated urine protein 814 mg at 29w0d (3/19/2021). (3/18/2021) NEGATIVE lupus anticoagulant, beta 2 glycoprotein Ab, anticardiolipin.      Primary osteoarthritis of left knee    Severe preeclampsia (HCC)    Delivered at 28 wk at [facility]        Past Surgical History:    Past Surgical History:   Procedure Laterality Date            at 28 wk for severe pre-eclampsia - [place]       2014      2021    Repeat LTCS at 32w0d - oligohydramnios, decreased fetal movement, preeclampsia with severe features Dr. Emi Benson, [facility].     Colposcopy, cervix w/upper adjacent vagina; w/biopsy(s), cervix  2013     Childhood umbilical hernia repair, no mesh reported    Family History:  Grandparents had DM and lung cancer (smokers)    Social History:    Social History     Socioeconomic History    Marital status:    Tobacco Use    Smoking status: Never    Smokeless tobacco: Never   Vaping Use    Vaping status: Never Used   Substance and Sexual Activity    Alcohol use: Not Currently     Alcohol/week: 0.0 standard drinks of alcohol     Comment: SOCIALLY    Drug use: No    Sexual activity: Not Currently     Partners: Male   Other Topics Concern    Caffeine Concern No    Exercise Yes    Special Diet No    Weight Concern Yes    Pt has a pacemaker No    Pt has a defibrillator No    Reaction to local anesthetic No       Medications:   Current Outpatient Medications:     ergocalciferol 1.25 MG (06570 UT) Oral Cap, Take 1 capsule (50,000 Units total) by mouth once a week., Disp: 4 capsule, Rfl: 2    Meloxicam 15 MG Oral Tab, Take 1 tablet (15 mg total) by mouth daily., Disp: 30 tablet, Rfl: 0    metRONIDAZOLE (FLAGYL) 500 MG Oral Tab, Take 1 tablet (500 mg total) by mouth in the morning and 1 tablet (500 mg total) before bedtime., Disp: 14 tablet, Rfl: 0    Phentermine HCl 37.5 MG Oral Cap, Take 1 capsule (37.5 mg total) by mouth every morning., Disp: 30 capsule, Rfl: 2    Tirzepatide-Weight Management (ZEPBOUND) 2.5 MG/0.5ML Subcutaneous Solution Auto-injector, Inject 2.5 mg into the skin once a week., Disp: 3 mL, Rfl: 2    hydroCHLOROthiazide 12.5 MG Oral Tab, Take 1 tablet (12.5 mg total) by mouth daily., Disp: 90 tablet, Rfl: 3    Clindamycin Phosphate 1 % External Gel, Apply 1 Application  topically nightly., Disp: 60 g, Rfl: 3    ibuprofen 800 MG Oral Tab, Take 1 tablet (800 mg total) by mouth every 6 (six) hours as needed for Pain., Disp: , Rfl:      Allergies:    Allergies   Allergen Reactions    Povidone Iodine RASH       ROS:          Constitutional: negative  HEENT: negative  Respiratory: negative  Cardiovascular: negative  Gastrointestinal: negative  Genitourinary: negative  Musculoskeletal: As per HPI  Neurological: negative  Psychological: negative  Endocrine: negative  Immunologic: negative  Integumentary: negative        Physical Exam:  Vital Signs:  /80   Pulse 82   Ht 5' 4\" (1.626 m)   Wt 264 lb (119.7 kg)   LMP 01/21/2024 (Exact Date)   SpO2 97%   BMI 45.32 kg/m²   BMI:  Body mass index is 45.32 kg/m².  General: no acute distress, well-nourished  HENT: normocephalic, atraumatic  Lung: breathing comfortably, symmetrical expansion, clear to ausculation bilaterally, no wheezing  Heart: regular rate and rhythm  Abdomen: soft, obese, non-tender, non-distended, no hernia/mass/organomegaly, prior pfannenstiel and umbilical incision from childhood hernia repair, some laxity there but no definite recurrence or incisional hernia  Extremities: normal strength, normal ROM, walks without assist device  Neuro: no focal deficits, cranial nerves grossly intact  Psych: alert and oriented, normal affect  Skin: warm, dry    Assessment: 34 year old  female with chronic morbid obesity, third restart, pursuing sleeve gastrectomy.    Dietitian -liquid protein diet scheduled 5/24/2024  Cardiology -cleared  Pulmonology -scheduled 6/21/2024  Psych -cleared  Bariatrician -Lexi  Labs - low vit D, rx sent    Plan:     The patient appears to be a possible candidate for bariatric surgery.  Still needs to finish dietary education and pulmonary clearance.  Although the patient is most interested in sleeve gastrectomy which gets her to within about 10 pounds of her goal weight and is safer in case she needs  to go back on steroids or NSAIDs, final decision regarding surgery will not be made until the above clearances are completed.  Follow-up next month for recheck.    Pedro Larose MD, 04/24/24, 11:35 AM

## 2024-05-15 NOTE — ED PROVIDER NOTES
Patient Seen in: [facility]      History     Chief Complaint   Patient presents with    Sore Throat     Stated Complaint: Sore Throat    Subjective:   HPI    34-year-old female presents with cold symptoms for the last week painful swallowing x 2 days.  Mom and son with strep at home.  No vomiting.  No fever presently.    Objective:   No pertinent past medical history.            No pertinent past surgical history.              No pertinent social history.            Review of Systems    Positive for stated complaint: Sore Throat  Other systems are as noted in HPI.  Constitutional and vital signs reviewed.      All other systems reviewed and negative except as noted above.    Physical Exam     ED Triage Vitals [05/15/24 1114]   /80   Pulse 108   Resp 12   Temp 98.5 °F (36.9 °C)   Temp src Temporal   SpO2 100 %   O2 Device None (Room air)       Current Vitals:   Vital Signs  BP: 128/80  Pulse: 108  Resp: 12  Temp: 98.5 °F (36.9 °C)  Temp src: Temporal    Oxygen Therapy  SpO2: 100 %  O2 Device: None (Room air)            Physical Exam  Vitals and nursing note reviewed.   Constitutional:       Appearance: She is well-developed.   HENT:      Right Ear: Tympanic membrane normal.      Left Ear: Tympanic membrane normal.      Nose: Congestion present.      Mouth/Throat:      Pharynx: Posterior oropharyngeal erythema present. No oropharyngeal exudate.      Tonsils: No tonsillar abscesses.   Eyes:      Conjunctiva/sclera: Conjunctivae normal.      Pupils: Pupils are equal, round, and reactive to light.   Cardiovascular:      Rate and Rhythm: Normal rate.   Pulmonary:      Effort: Pulmonary effort is normal.   Lymphadenopathy:      Cervical: Cervical adenopathy present.   Skin:     General: Skin is warm and dry.   Neurological:      Mental Status: She is alert.              ED Course     Labs Reviewed   RAPID STREP A - Abnormal; Notable for the following components:       Result Value    Strep A by PCR  Positive (*)     All other components within normal limits                      MDM          Viral pharyngitis rule out strep rule out COVID considered PTA  Strep positive  All VSS at discharge  Supportive care measures: Tylenol, ibuprofen, steam, amox, close PMD follow-up strict return precautions given                                  Medical Decision Making  Problems Addressed:  Strep pharyngitis: acute illness or injury with systemic symptoms that poses a threat to life or bodily functions    Risk  OTC drugs.  Prescription drug management.        Disposition and Plan     Clinical Impression:  1. Strep pharyngitis         Disposition:  Discharge  5/15/2024 11:45 am    Follow-up:  Grace Montenegro MD  [address] [place] [id number]  [phone]    Schedule an appointment as soon as possible for a visit   As needed          Medications Prescribed:  Discharge Medication List as of 5/15/2024 11:50 AM        START taking these medications    Details   amoxicillin 500 MG Oral Tab Take 1 tablet (500 mg total) by mouth 2 (two) times daily for 10 days., Normal, Disp-20 tablet, R-0

## 2024-05-15 NOTE — ED INITIAL ASSESSMENT (HPI)
Patient with bodyaches and sore throat x 1 week, patient has been using OTC meds with some relief

## 2024-05-23 NOTE — PROGRESS NOTES
[facility]  [address]  [address]  [facility] [address]  Dept: [phone]  Loc: [phone]    05/23/24    Bariatric Liquid Protein Nutrition Session    Dilcia Garcia is a 34 year old female    Assessment   Procedure:  Vertical Sleeve Gastrectomy  Revision: n/a  Surgery Date:  tbd  Medical Diagnosis:  obesity grade III, HTN, DANIA on Cpap    Height:    Ht Readings from Last 1 Encounters:   04/24/24 5' 4\" (1.626 m)     Weight:    Wt Readings from Last 6 Encounters:   04/24/24 264 lb (119.7 kg)   04/22/24 267 lb (121.1 kg)   03/20/24 265 lb 4.8 oz (120.3 kg)   02/28/24 259 lb 12.8 oz (117.8 kg)   02/28/24 257 lb (116.6 kg)   01/30/24 258 lb (117 kg)       Weight change:  down 10.6 lbs since LOV  BMI: There is no height or weight on file to calculate BMI.    Lab Results:  Lab Results   Component Value Date    GLU 67 (L) 03/08/2024    BUN 13 03/08/2024    BUNCREA 13.8 03/08/2024    CREATSERUM 0.94 03/08/2024    ANIONGAP 6 03/08/2024    GFRNAA 88 12/09/2021    GFRAA 101 12/09/2021    CA 9.7 03/08/2024    OSMOCALC 290 03/08/2024    ALKPHO 68 03/08/2024    AST 15 03/08/2024    ALT 10 03/08/2024    BILT 0.6 03/08/2024    TP 7.8 03/08/2024    ALB 4.4 03/08/2024    GLOBULIN 3.4 03/08/2024     03/08/2024    K 3.7 03/08/2024     03/08/2024    CO2 28.0 03/08/2024       Lab Results   Component Value Date    MG 5.8 (HH) 04/10/2021     Phosphorus (mg/dL)   Date Value   09/09/2020 2.4 (L)   04/18/2018 3.1        Thyroid:    Lab Results   Component Value Date    TSH 0.958 03/08/2024    TSH 1.020 08/17/2022    TSH 0.798 12/09/2021       Iron Panel:  Lab Results   Component Value Date    IRON 60 03/08/2024    TRANSFERRIN 243 (L) 03/08/2024    TIBCP 362 03/08/2024    SAT 17 03/08/2024       CBC:  Lab Results   Component Value Date    WBC 5.5 03/08/2024    WBC 4.7 08/25/2023    WBC 6.3 08/17/2022     Lab Results   Component Value Date    HGB 13.3 03/08/2024    HGB  13.2 08/25/2023    HGB 13.3 08/17/2022      Lab Results   Component Value Date    .0 03/08/2024    .0 08/25/2023    .0 08/17/2022        Diabetes:    HGBA1C:    Lab Results   Component Value Date    A1C 5.4 03/08/2024    A1C 5.4 08/25/2023    A1C 5.5 08/17/2022     03/08/2024       Lipid Panel:  Lab Results   Component Value Date    CHOLEST 195 03/08/2024    TRIG 85 03/08/2024    HDL 51 03/08/2024     (H) 03/08/2024    VLDL 15 03/08/2024    NONHDLC 144 (H) 03/08/2024       Vitamins/Minerals:  Lab Results   Component Value Date    B12 780 03/08/2024     Lab Results   Component Value Date    VITD 23.0 (L) 03/08/2024     Lab Results   Component Value Date/Time    THIAMINE 109 08/17/2022 08:17 AM      Lab Results   Component Value Date/Time    VITB1 109.2 03/08/2024 11:35 AM     Lab Results   Component Value Date/Time    FOLIC 20.6 03/08/2024 11:35 AM       Meds:    Current Outpatient Medications:     amoxicillin 500 MG Oral Tab, Take 1 tablet (500 mg total) by mouth 2 (two) times daily for 10 days., Disp: 20 tablet, Rfl: 0    ergocalciferol 1.25 MG (59835 UT) Oral Cap, Take 1 capsule (50,000 Units total) by mouth once a week., Disp: 4 capsule, Rfl: 2    Meloxicam 15 MG Oral Tab, Take 1 tablet (15 mg total) by mouth daily., Disp: 30 tablet, Rfl: 0    metRONIDAZOLE (FLAGYL) 500 MG Oral Tab, Take 1 tablet (500 mg total) by mouth in the morning and 1 tablet (500 mg total) before bedtime., Disp: 14 tablet, Rfl: 0    Phentermine HCl 37.5 MG Oral Cap, Take 1 capsule (37.5 mg total) by mouth every morning., Disp: 30 capsule, Rfl: 2    Tirzepatide-Weight Management (ZEPBOUND) 2.5 MG/0.5ML Subcutaneous Solution Auto-injector, Inject 2.5 mg into the skin once a week., Disp: 3 mL, Rfl: 2    hydroCHLOROthiazide 12.5 MG Oral Tab, Take 1 tablet (12.5 mg total) by mouth daily., Disp: 90 tablet, Rfl: 3    Clindamycin Phosphate 1 % External Gel, Apply 1 Application topically nightly., Disp: 60 g, Rfl:  3    ibuprofen 800 MG Oral Tab, Take 1 tablet (800 mg total) by mouth every 6 (six) hours as needed for Pain., Disp: , Rfl:       Diet recall:          Breakfast         Snack         Lunch          Snack        Dinner         Snack   2 scr eggs and turkey sausage or link or yogurt parfait or Premier protein shakes   greek yogurt   Premier protein shake  Smoothie or frozen fruit Premier protein shake     skip         Total Kcal: 1150 calories/day  Protein intake: 72-78 grams/day  Carbs: 130 gm/day  Fluid intake:  64-80 ounces/day    Excessive in: nothing  Inadequate in:  nothing  Patient has made some modifications and adjustments to diet: yes,  per pt is eating separately from drinking-waiting 30 minutes after eating to drink, is chewing food well before swallowing, eating more slowly, is eating smaller portions, is eating more protein, is eating at least 5 times per day-used to be 3 times per day,  is eating 1-2 servings of fruit per day and 4-6 servings of vegetables per day, eliminated caffeine and sugary drinks.   Meal pattern consists of 3 meals, 2 snacks and 0 protein supplements.    Vitamin/mineral supplements:  Vit D  Protein supplements:  Other Honestly Now or Eventtus protein shake  Activity Level: active  Type: walk and other: lifting weights once per week for 30 minutes  Duration: 30 minutes  Frequency: per week    Other:  Met with pt for LPD instruction visit.  Per pt was sick for past 3 weeks, strep throat and an abscess on tonsils.  Per pt is starting to feel better today.  Per pt cut out red meat since LOV. Per pt is being followed by Dr. Elliott for medical weight management. Per pt is pursuing VSG with Dr. Larose for the third time. Per pt is continuing to take phentermine 5 days per week and feels that it helps to curb hunger.  Per pt is keeping a food record in P is consuming 1150 calories, 72-78 gm protein and 130 gm of carbs per day. Per pt is eating separately from drinking-waiting 30 minutes  after eating to drink, is chewing food well before swallowing, eating more slowly, is eating smaller portions, is eating more protein, is eating at least 5 times per day-used to be 3 times per day,  is eating 1-2 servings of fruit per day and 4-6 servings of vegetables per day, eliminated caffeine and sugary drinks.  Discussed with pt goal to increase protein to  grams per day.  Provided recipe.  Pt verbalized understanding.  Per pt is drinking at least 64-80 oz per day of water. Per pt is active walking 30 minutes pe day. Per pt is strength training about 30 minutes per week.  Reviewed LPD instruction.  Answered pt questions.  Pt is ready for surgery from a dietary perspective.  Pt with follow up visit scheduled for next month to ensure staying on track prior to surgery.     Nutrition Diagnosis: Morbid obesity: Improvement shown    Intervention     Interventions: Counseled for liquid protein diet, Gave hospital orientation, Reviewed quizzes, Materials given LPD checklist, snapshot of diet progression, dietary guidelines following surgery, and Vertical sleeve gastrectomy surgery scheduled for tbd  Reviewed ERAS requirements.  Provided CHG bath and carb loading handouts.  Provided/reviewed handout on pain control after bariatric surgery.  Recommendations/goals:   Keep it going goals:   1.  Continue to keep a food record, My Net Diary, select the macros dashboard.  2.  Continue to consume snacks.  For example, peanuts, hard boiled eggs, protein shake/bar, protein smoothie, lunch meat with broccoli, carrots, cucumbers, pineapple, cassie, watermelon, berries. Strive to consume at least 4-6 meals/snacks per day.  Include protein and produce when you eat.  Aim for  grams of protein per day.  Try to keep the carbohydrates at 100-120 grams per day or less.  Try the recipes for getting in protein with produce.  3.  Wait 30 minutes after eating to drink.  Continue to practice eating strategies, eat separately from  drinking, avoid straws, chew food 20-30 times before swallowing.  Make the meals last 30 minutes.  4.  Continue to drink at least aim for 64 oz per day of water.  (Try  Protein water, adding True Lemon, Crystal Light).  5. Continue to exercise with a goal of 30 minutes per day for exercise (for example,walking).    6.  Continue to strength train at least 10 minutes 3 days per week.   (7 minute workout song on YouTube).    Work in progress goals:   1.  Line up your protein supplements for liquid protein, for example 5 per day of Slim Fast protein shake.  2. Buy the Ensure -Pre Surgery drink, 2 bottles.  3.  Buy the liquid or dissolvable pack of Tylenol.   Questions?  Email [email or web address]      Monitor/Evaluate     Anthropometric measurements, Food/fluid intake/choices, Food intolerances, Activity level, Vitamin/mineral supplementation, Reinforce goals, and Calorie/protein intake  Patient understands protein requirements? yes  Patient understand fluid requirements (amount and method of intake)? yes  Patient understands post-operative diet? Yes  Patient completed nutrition quizzes? Yes, on 4/22/24  Patient signed commitment agreement? yes  Patient with RD clearance for surgery? yes    Nicole Alan, HARVEY, LDN   Face-to-face time spent with pt: 30 minutes

## 2024-05-23 NOTE — ED INITIAL ASSESSMENT (HPI)
Pt presents to ed from IC to r/o infection. Pt states she was at IC and dx with strep. Pt states she has been having swollen tonsils, sore throat and trouble swallowing for three weeks.  Reports IC sent her to ED to get  a CT scan.     Pt reports she recently finished abx for strep and did not feel better.

## 2024-05-23 NOTE — ED PROVIDER NOTES
Patient Seen in: [facility]      History     Chief Complaint   Patient presents with    Sore Throat     Stated Complaint: sore throat and face swelling    Subjective:   HPI    34-year-old female with past medical history of resolved gestational diabetes with no further diabetes, who presents with ongoing sore throat.  She states she was assessed for strep throat and resulted positive on 5/15/2024 and has been compliant with the prescribed amoxicillin.  This is consistent with chart review.  She states symptoms have progressed on the left side and it is limiting oral intake, she states symptoms initially improved and then acutely worsened over the last couple of days.  She has been taking Mucinex which allows her to drink cold liquids as it improves her sore throat but does not resolve it.  She denies any vomiting or fevers.  She feels there is swelling of the glands on the left side of the throat.  She states she has not had her period for 4 days but denies any vaginal bleeding or abdominal pain.    Objective:   Past Medical History:    Abnormal Pap smear of cervix    HPV+. [place]-2013.    Chlamydia    COVID-19 affecting pregnancy in second trimester ([facility])    Positive test 2020. Did not take Lovenox.     Decorative tattoo    Genital herpes simplex    Gestational diabetes mellitus (GDM) requiring insulin ([facility])    Insulin    Gonorrhea    treated    Heart murmur    Had a heart murmur when she was born until 10 years.  Does not take any medicine.    History of chicken pox    History of  delivery    26-28 wk. Delivered for severe preeclampsia, IUGR.     History of prior pregnancy with IUGR     Hypertension    Had pre clampsia during pregnancy and pt was induced to have her baby.     Low back pain during pregnancy in first trimester ([facility])    Hospitalized with severe low back pain in 1st trimester of pregnancy. Could not walk without morphine. Physical therapy.     Migraine    Morbid  obesity with BMI of 45.0-49.9, adult ([facility])    Pre-preg BMI 47.5     Obesity    DANIA on CPAP    Periodic headache syndrome, not intractable    Preeclampsia, third trimester ([facility])    BP elevated at 28+ wk. Elevated urine protein 814 mg at 29w0d (3/19/2021). (3/18/2021) NEGATIVE lupus anticoagulant, beta 2 glycoprotein Ab, anticardiolipin.      Primary osteoarthritis of left knee    Severe preeclampsia (HCC)    Delivered at 28 wk at [facility]               Past Surgical History:   Procedure Laterality Date           at 28 wk for severe pre-eclampsia - [facility]       2014      2021    Repeat LTCS at 32w0d - oligohydramnios, decreased fetal movement, preeclampsia with severe features Dr. Emi Benson, [facility].     Colposcopy, cervix w/upper adjacent vagina; w/biopsy(s), cervix  2013                Social History     Socioeconomic History    Marital status:    Tobacco Use    Smoking status: Never    Smokeless tobacco: Never   Vaping Use    Vaping status: Never Used   Substance and Sexual Activity    Alcohol use: Not Currently     Alcohol/week: 0.0 standard drinks of alcohol     Comment: SOCIALLY    Drug use: No    Sexual activity: Not Currently     Partners: Male   Other Topics Concern    Caffeine Concern No    Exercise Yes    Special Diet No    Weight Concern Yes    Pt has a pacemaker No    Pt has a defibrillator No    Reaction to local anesthetic No   Social History Narrative    The patient does not use an assistive device..      The patient does  live in a home with stairs.              Review of Systems    Positive for stated complaint: sore throat and face swelling  Other systems are as noted in HPI.  Constitutional and vital signs reviewed.      All other systems reviewed and negative except as noted above.    Physical Exam     ED Triage Vitals [24 1657]   /82   Pulse 108   Resp 20   Temp 99.5 °F (37.5 °C)   Temp src Oral   SpO2 99 %   O2  Device None (Room air)       Current Vitals:   Vital Signs  BP: 129/82  Pulse: 108  Resp: 20  Temp: 99.5 °F (37.5 °C)  Temp src: Oral    Oxygen Therapy  SpO2: 99 %  O2 Device: None (Room air)            Physical Exam    General: Awake, alert, comfortable on room air, in no distress, tolerating oral secretions, interactive  Pulmonary: Lungs CTA B, no wheezing, no conversational dyspnea  GI: Abdomen soft, nontender, nondistended, no rebound, no guarding, no hepatomegaly, no splenomegaly  Neuro: symmetrical facial expressions on gross observation  HEENT: no periorbital edema or erythema, nonerythematous and nonedematous intact B/L TMs, no erythema or edema of the B/L ear canals, erythematous and edematous tonsils with possible uvular deviation to the right, no tonsillar exudates, no peritonsillar edema, uvula midline, tolerating oral secretions, normal speech, no submandibular edema  Neck: No anterior posterior cervical lymphadenopathy, intact neck flexion and extension with no pain with flexion and extension of the neck, tenderness to palpation of the left submandibular gland  Psych: Normal mood, normal affect    ED Course     Labs Reviewed   POCT CBC - Abnormal; Notable for the following components:       Result Value    WBC IC 13.2 (*)     # Neutrophil 11.1 (*)     All other components within normal limits   POCT ISTAT CHEM8 CARTRIDGE - Abnormal; Notable for the following components:    ISTAT Glucose 135 (*)     All other components within normal limits   RAPID STREP A - Abnormal; Notable for the following components:    Strep A by PCR Positive (*)     All other components within normal limits   POCT MONO TEST - Normal   POCT PREGNANCY URINE - Normal   POCT FLU TEST - Normal    Narrative:     This assay is a rapid molecular in vitro test utilizing nucleic acid amplification of influenza A and B viral RNA.   RAPID SARS-COV-2 BY PCR - Normal         MDM   Patient is awake, alert, in no distress, tolerating oral  secretions, she states symptoms of strep throat initially improved but then acutely worsened over the last couple of days, she is only able to tolerate liquids and after using Mucinex to help with pain, no signs of dehydration on exam, no signs of John's angina, possible deviation of the uvula which is concerning for possible left-sided PTA versus deep space infection, she has intact flexion and extension of the neck but given return of symptoms while being treated for active strep throat infection and progression of symptoms concern for possible deep space infection and will assess with CBC, BMP, mono, COVID, and influenza testing considering possible new viral pharyngitis versus ongoing bacterial pharyngitis versus deep space infection, will assess pregnancy test given patient reports late menses but with no abdominal pain or vaginal bleeding to suspect torsion, ectopic pregnancy, or possible miscarriage    Patient has normal kidney function and electrolytes, glucose is 135 but this is nonfasting, she has white blood cell count of 13 with continued positive strep test, mono was negative, flu was negative, COVID is negative, and pregnancy is negative.  Patient was updated regarding these results.    Patient initially denied any allergy to iodine when we discussed her plan at bedside, but when radiology tech was reviewing prior to CT scan there is report of iodine allergy, this is reportedly only due to povidone iodine but is reportedly a rash.  Per the radiology tech, she called the radiologist, and in the radiology tech discussion with radiologist, patient should be monitored after administering contrast.  Given limitations of immediate care if the patient were to have adverse reaction to iodine, will not perform advanced imaging or give iodine, and would recommend immediate assessment in the emergency department given concern for possible PTA versus deep space infection as above.  I discussed concerns with  patient and discussed treatment with Augmentin and steroids and follow-up with ENT as well as monitoring her clinical course versus emergency department assessment for possible CT scan to assess for deep space infection given if there is deep space infection this could become life-threatening.  Patient is agreeable to assessment in the emergency department and prefers assessment in the emergency department for possible advanced imaging depending on their assessment and recommendations and will present immediately to the [place] emergency department.        Disposition and Plan     Clinical Impression:  1. Throat pain         Disposition:  Ic to ed  5/23/2024  6:19 pm    Follow-up:  No follow-up provider specified.        Medications Prescribed:  Discharge Medication List as of 5/23/2024  6:22 PM          None

## 2024-05-23 NOTE — ED INITIAL ASSESSMENT (HPI)
Pt here with reports of strep + on 5/15, finished course of antibiotics and feels like face is swollen and continues with sore throat.

## 2024-05-24 NOTE — PATIENT INSTRUCTIONS
Recommendations/goals:   Keep it going goals:   1.  Continue to keep a food record, My Net Diary, select the macros dashboard.  2.  Continue to consume snacks.  For example, peanuts, hard boiled eggs, protein shake/bar, protein smoothie, lunch meat with broccoli, carrots, cucumbers, pineapple, cassie, watermelon, berries. Strive to consume at least 4-6 meals/snacks per day.  Include protein and produce when you eat.  Aim for  grams of protein per day.  Try to keep the carbohydrates at 100-120 grams per day or less.  Try the recipes for getting in protein with produce.  3.  Wait 30 minutes after eating to drink.  Continue to practice eating strategies, eat separately from drinking, avoid straws, chew food 20-30 times before swallowing.  Make the meals last 30 minutes.  4.  Continue to drink at least aim for 64 oz per day of water.  (Try  Protein water, adding True Lemon, Crystal Light).  5. Continue to exercise with a goal of 30 minutes per day for exercise (for example,walking).    6.  Continue to strength train at least 10 minutes 3 days per week.   (7 minute workout song on YouTube).    Work in progress goals:   1.  Line up your protein supplements for liquid protein, for example 5 per day of Slim Fast protein shake.  2. Buy the Ensure -Pre Surgery drink, 2 bottles.  3.  Buy the liquid or dissolvable pack of Tylenol.   Questions?  Email [email or web address]

## 2024-05-24 NOTE — ED PROVIDER NOTES
Patient Seen in: [facility] Emergency Department      History     Chief Complaint   Patient presents with    Sore Throat     Stated Complaint: Sore throat, sent from [facility] to R/O infection in the back of throat.    Subjective:   HPI    35 yo female, hx htn, obesity, eliel on cpap, presenting for the evaluation of sore throat. Onset of symptoms initially ~, she was seen in urgent care, and diagnosed with strep pharyngitis, started on amoxicillin. Despite this she has had progressive sore throat. Now more localized to the left side.  Has had fevers and chills. Was seen in urgent care, had POCT labs, which were remarkble for positive strep, negative mono, wbc 13.2, CT soft tissue neck was ordered, but she was sent to the ER to have this test d/t possible iodine allergy. She states allergy to betadine, has not had ct contrast dye that she can recall. Reaction was hives, no anaphylaxis.     Objective:   Past Medical History:    Abnormal Pap smear of cervix    HPV+. Colpo-2013.    Chlamydia    COVID-19 affecting pregnancy in second trimester ([facility])    Positive test 2020. Did not take Lovenox.     Decorative tattoo    Genital herpes simplex    Gestational diabetes mellitus (GDM) requiring insulin ([facility])    Insulin    Gonorrhea    treated    Heart murmur    Had a heart murmur when she was born until 10 years.  Does not take any medicine.    History of chicken pox    History of  delivery    26-28 wk. Delivered for severe preeclampsia, IUGR.     History of prior pregnancy with IUGR     Hypertension    Had pre clampsia during pregnancy and pt was induced to have her baby.     Low back pain during pregnancy in first trimester ([facility])    Hospitalized with severe low back pain in 1st trimester of pregnancy. Could not walk without morphine. Physical therapy.     Migraine    Morbid obesity with BMI of 45.0-49.9, adult ([facility])    Pre-preg BMI 47.5     Obesity    ELIEL on CPAP    Periodic headache syndrome, not  intractable    Preeclampsia, third trimester (HCC)    BP elevated at 28+ wk. Elevated urine protein 814 mg at 29w0d (3/19/2021). (3/18/2021) NEGATIVE lupus anticoagulant, beta 2 glycoprotein Ab, anticardiolipin.      Primary osteoarthritis of left knee    Severe preeclampsia (HCC)    Delivered at 28 wk at [place]               Past Surgical History:   Procedure Laterality Date           at 28 wk for severe pre-eclampsia - [place]       2014      2021    Repeat LTCS at 32w0d - oligohydramnios, decreased fetal movement, preeclampsia with severe features Dr. Emi Benson, [facility].     Colposcopy, cervix w/upper adjacent vagina; w/biopsy(s), cervix  2013                Social History     Socioeconomic History    Marital status:    Tobacco Use    Smoking status: Never    Smokeless tobacco: Never   Vaping Use    Vaping status: Never Used   Substance and Sexual Activity    Alcohol use: Not Currently     Alcohol/week: 0.0 standard drinks of alcohol     Comment: SOCIALLY    Drug use: No    Sexual activity: Not Currently     Partners: Male   Other Topics Concern    Caffeine Concern No    Exercise Yes    Special Diet No    Weight Concern Yes    Pt has a pacemaker No    Pt has a defibrillator No    Reaction to local anesthetic No   Social History Narrative    The patient does not use an assistive device..      The patient does  live in a home with stairs.              Review of Systems    Positive for stated complaint: Sore throat, sent from [facility] to R/O infection in the back of throat.  Other systems are as noted in HPI.  Constitutional and vital signs reviewed.      All other systems reviewed and negative except as noted above.    Physical Exam     ED Triage Vitals [24 1856]   BP (!) 140/92   Pulse 105   Resp 20   Temp 99.2 °F (37.3 °C)   Temp src Temporal   SpO2 97 %   O2 Device None (Room air)       Current Vitals:   Vital Signs  BP: (!) 135/91  Pulse:  80  Resp: 20  Temp: 99.2 °F (37.3 °C)  Temp src: Temporal    Oxygen Therapy  SpO2: 98 %  O2 Device: None (Room air)            Physical Exam    Constitutional: awake, alert, no sig distress  HENT: Exam with mild trismus, symmetric tonsillar swelling and erythema no exudate, and tender submandibular LAD, predominantly on the left side  Neck: normal range of motion, no tenderness, supple.  Eyes: PERRL, EOMI, conjunctiva normal, no discharge. Sclera anicteric.  Cardiovascular: rr no murmur  Respiratory: Normal breath sounds, no respiratory distress, no wheezing, no chest tenderness.  GI: Bowel sounds normal, Soft, no tenderness, no masses, no pulsatile masses.  : No CVA tenderness.  Skin: Warm, dry, no erythema, no rash.  Musculoskeletal: Intact distal pulses, no edema, no tenderness, no cyanosis, no clubbing. Good range of motion in all major joints. No tenderness to palpation or major deformities noted. Back- No tenderness.  Neurologic: Alert & oriented x 3, normal motor function, normal sensory function, no focal deficits noted.  Psych: Calm, cooperative, nl affect        ED Course   Labs Reviewed - No data to display       ED Course as of 05/23/24 2224  ------------------------------------------------------------  Time: 05/23 2000  Comment: CONCLUSION:      Severe bilateral tonsillitis with a 1.6 cm left tonsillar abscess.      Hypertrophied adenoids.     ------------------------------------------------------------  Time: 05/23 2000  Comment: Reviewed CT independently which is remarkable for peritonsillar abscess on the left side.   ------------------------------------------------------------  Time: 05/23 2212  Comment: Patient was observed for period of time following PTA aspiration.  She is clinically improved and tolerating p.o.  She was offered admission but ultimately declined and was comfortable discharge home.  Return precautions and follow-up instructions were discussed with patient who voiced  understanding and agreement the plan.  All questions were answered to patient satisfaction.              MDM      34F hx as above presenting with recurrent sore throat, left sided. On arrival VS remarkable for pyrexia, tachycardia.  Phonating normally, no drooling, tripoding or stridor  Exam with mild trismus, symmetric tonsillar swelling, and tender submandibular LAD, predominantly on the left side  Ddx: PTA, Cervical adenitis, Viral Pharyngitis, Strep pharyngitis  Will give solumedrol/benadryl and obtain CT STN w/ con      Peritonsillar abscess drainage procedure note:  Indication: Left sided peritonsillar abscess  Obtained verbal consent from patient.  We discussed risk of bleeding infection, and risks of incidental carotid or neurovascular injury.  Topical analgesia: 2% lidocaine w/ epi  Under direct visualization 18-gauge needle was advanced to the right peritonsillar region,  able to aspirate pus, and observed pus draining from fenestration in the posterior pharynx.  There were no immediate complications the patient tolerated the procedure well                             Medical Decision Making      Disposition and Plan     Clinical Impression:  1. Peritonsillar abscess         Disposition:  Discharge  5/23/2024 10:11 pm    Follow-up:  Grace Montenegro MD  [address] [address] [phone]    Follow up in 2 day(s)      [facility] Emergency Department  [address] [address] [phone]  Follow up  If symptoms worsen, As needed    Drew Castillo DO  [address]  [address]  [place] [place] [address] [phone]    Call today            Medications Prescribed:  Discharge Medication List as of 5/23/2024 10:13 PM        START taking these medications    Details   amoxicillin clavulanate 875-125 MG Oral Tab Take 1 tablet by mouth 2 (two) times daily for 10 days., Normal, Disp-20 tablet, R-0

## 2024-05-24 NOTE — ED QUICK NOTES
Patient reported that she is feeling better and would like to go home. Provider Hunter made aware.

## 2024-05-24 NOTE — DISCHARGE INSTRUCTIONS
Return to the ER worsening pain swelling difficulty breathing or swallowing or any new concerns.  Take medications as prescribed and follow-up with the ENT

## 2024-06-21 NOTE — H&P
[facility]    Pulmonary consult     Dilcia Garcia Patient Status:  Specimen    1990 MRN [id number]   Location [facility] Attending No att. providers found   Hosp Day # 0 PCP Grace Montenegro MD     Date:  2024    History provided by:patient  HPI:     Chief Complaint   Patient presents with    Consult     Surgery clearance     HPI    This is a very pleasant 34-year-old female with history of obesity with a BMI of 43 and seeking bariatric surgery    Presented to my office today for pulmonary clearance  Asymptomatic from pulmonary point of view  Denies any history of smoking or vaping or asthma  Denies any shortness of breath or cough or wheezes  No dyspnea or dyspnea upon exertion  Denies chest pain orthopnea PND  Denies a history of DVT or PE  Denies TB or exposure  Denies lower extremity edema or pain or calf tenderness  Denies symptoms for DANIA with normal ESS  The rest of her 12 point review of system is negative      History     Past Medical History:    Abnormal Pap smear of cervix    HPV+. Colpo-2013.    Chlamydia    COVID-19 affecting pregnancy in second trimester ([facility])    Positive test 2020. Did not take Lovenox.     Decorative tattoo    Genital herpes simplex    Gestational diabetes mellitus (GDM) requiring insulin ([facility])    Insulin    Gonorrhea    treated    Heart murmur    Had a heart murmur when she was born until 10 years.  Does not take any medicine.    History of chicken pox    History of  delivery    26-28 wk. Delivered for severe preeclampsia, IUGR.     History of prior pregnancy with IUGR     Hypertension    Had pre clampsia during pregnancy and pt was induced to have her baby.     Low back pain during pregnancy in first trimester (HCC)    Hospitalized with severe low back pain in 1st trimester of pregnancy. Could not walk without morphine. Physical therapy.     Migraine     Morbid obesity with BMI of 45.0-49.9, adult (HCC)    Pre-preg BMI 47.5     Obesity    DANIA on CPAP    Periodic headache syndrome, not intractable    Preeclampsia, third trimester ([facility])    BP elevated at 28+ wk. Elevated urine protein 814 mg at 29w0d (3/19/2021). (3/18/2021) NEGATIVE lupus anticoagulant, beta 2 glycoprotein Ab, anticardiolipin.      Primary osteoarthritis of left knee    Severe preeclampsia (HCC)    Delivered at 28 wk at [place]      Past Surgical History:   Procedure Laterality Date           at 28 wk for severe pre-eclampsia - [place]       2014      2021    Repeat LTCS at 32w0d - oligohydramnios, decreased fetal movement, preeclampsia with severe features Dr. Emi Benson, [facility].     Colposcopy, cervix w/upper adjacent vagina; w/biopsy(s), cervix  2013     History reviewed. No pertinent family history.  Social History:  Social History     Socioeconomic History    Marital status:    Tobacco Use    Smoking status: Never    Smokeless tobacco: Never   Vaping Use    Vaping status: Never Used   Substance and Sexual Activity    Alcohol use: Not Currently     Alcohol/week: 0.0 standard drinks of alcohol     Comment: SOCIALLY    Drug use: No    Sexual activity: Not Currently     Partners: Male   Other Topics Concern    Caffeine Concern No    Exercise Yes    Special Diet No    Weight Concern Yes    Pt has a pacemaker No    Pt has a defibrillator No    Reaction to local anesthetic No   Social History Narrative    The patient does not use an assistive device..      The patient does  live in a home with stairs.     Allergies/Medications:   Allergies:   Allergies   Allergen Reactions    Povidone Iodine RASH     (Not in a hospital admission)      Review of Systems:     Constitutional: Negative.    HENT: Negative.     Eyes: Negative.    Respiratory: Negative.     Cardiovascular: Negative.    Gastrointestinal: Negative.    Musculoskeletal:  Negative.    Skin: Negative.    Neurological: Negative.    Hematological: Negative.    Psychiatric/Behavioral: Negative.         Physical Exam:   Vital Signs:  Blood pressure 121/79, pulse 91, height 5' 4\" (1.626 m), weight 255 lb (115.7 kg), last menstrual period 04/19/2024, SpO2 100%, not currently breastfeeding.  Physical Exam  Constitutional:       General: She is not in acute distress.     Appearance: Normal appearance. She is not ill-appearing.   HENT:      Head: Normocephalic and atraumatic.      Nose: Nose normal.      Mouth/Throat:      Mouth: Mucous membranes are moist.      Comments: Upper airway class II Mallampati score  Eyes:      General: No scleral icterus.  Cardiovascular:      Rate and Rhythm: Normal rate and regular rhythm.      Heart sounds: No murmur heard.     No gallop.   Pulmonary:      Effort: Pulmonary effort is normal. No respiratory distress.      Breath sounds: Normal breath sounds. No stridor. No wheezing, rhonchi or rales.   Chest:      Chest wall: No tenderness.   Abdominal:      General: Abdomen is flat. Bowel sounds are normal.      Palpations: Abdomen is soft.      Tenderness: There is no guarding.   Musculoskeletal:      Cervical back: Normal range of motion and neck supple. No rigidity.      Right lower leg: No edema.      Left lower leg: No edema.   Lymphadenopathy:      Cervical: No cervical adenopathy.   Skin:     General: Skin is dry.   Neurological:      General: No focal deficit present.      Mental Status: She is oriented to person, place, and time.           Results:     Lab Results   Component Value Date    WBC 5.5 03/08/2024    HGB 13.3 03/08/2024    HCT 41.7 03/08/2024    .0 03/08/2024    CREATSERUM 0.94 03/08/2024    BUN 13 03/08/2024     03/08/2024    K 3.7 03/08/2024     03/08/2024    CO2 28.0 03/08/2024    GLU 67 (L) 03/08/2024    CA 9.7 03/08/2024    ALB 4.4 03/08/2024    ALKPHO 68 03/08/2024    BILT 0.6 03/08/2024    TP 7.8 03/08/2024    AST 15  03/08/2024    ALT 10 03/08/2024    PTT 30.7 03/18/2021    TSH 0.958 03/08/2024    MG 5.8 (HH) 04/10/2021    PHOS 2.4 (L) 09/09/2020    B12 780 03/08/2024         Assessment/Plan:      1- obesity BMI 43 and seeking bariatric surgery  Asymptomatic from pulmonary point of view with normal lung exam  100% O2 sat on room air  No history of smoking or vaping or occupational exposure  No history of asthma or other chronic lung disease  Denied symptoms for DANIA with normal ESS  No history of TB or exposure  Denied history or family history for VTE.  No further pulmonary testing is needed    Patient is stable and clear from pulmonary point of view to have bariatric surgery  Patient at low risk from pulmonary perspective for perioperative pulmonary complications .              Jenniffer Acuña MD  6/21/2024

## 2024-06-24 NOTE — PROGRESS NOTES
HPI:    Patient ID: Dilcia Garcia is a 34 year old female.    HPI  Pt presenting for follow-up.     H/o migraines  Requesting FMLA renewal Q6months per employer  Stable on pain current regimen     H/o chronic b/l knee pain R>L  Follows with Physiatry  Awaiting Bariatric surgery    Review of Systems   A comprehensive 10 point review of systems was completed.  Pertinent positives and negatives noted in the the HPI.       Current Outpatient Medications   Medication Sig Dispense Refill    acyclovir (ZOVIRAX) 5 % External Ointment acyclovir (ZOVIRAX) 5 % External Ointment, [RxNorm: 368143]      tacrolimus 0.1 % External Ointment tacrolimus 0.1 % External Ointment, [RxNorm: 357068]      Meloxicam 15 MG Oral Tab Take 1 tablet (15 mg total) by mouth daily. 30 tablet 0    Phentermine HCl 37.5 MG Oral Cap Take 1 capsule (37.5 mg total) by mouth every morning. 30 capsule 2    hydroCHLOROthiazide 12.5 MG Oral Tab Take 1 tablet (12.5 mg total) by mouth daily. 90 tablet 3    Clindamycin Phosphate 1 % External Gel Apply 1 Application topically nightly. 60 g 3    ibuprofen 800 MG Oral Tab Take 1 tablet (800 mg total) by mouth every 6 (six) hours as needed for Pain.      Tirzepatide-Weight Management (ZEPBOUND) 2.5 MG/0.5ML Subcutaneous Solution Auto-injector Inject 2.5 mg into the skin once a week. (Patient not taking: Reported on 5/23/2024) 3 mL 2     Allergies:  Allergies   Allergen Reactions    Povidone Iodine RASH      Vitals:    06/24/24 0938   BP: 118/74   Pulse: 78   Temp: 97.9 °F (36.6 °C)   TempSrc: Temporal   SpO2: 98%   Weight: 256 lb 12.8 oz (116.5 kg)   Height: 5' 4\" (1.626 m)       Body mass index is 44.08 kg/m².   PHYSICAL EXAM:   Physical Exam  Vitals reviewed.   Constitutional:       General: She is not in acute distress.  Eyes:      Conjunctiva/sclera: Conjunctivae normal.   Cardiovascular:      Rate and Rhythm: Normal rate.      Pulses: Normal pulses.   Pulmonary:      Effort: Pulmonary effort is normal. No  respiratory distress.   Neurological:      General: No focal deficit present.      Mental Status: She is alert and oriented to person, place, and time. Mental status is at baseline.   Psychiatric:         Mood and Affect: Mood normal.         Behavior: Behavior normal.             ASSESSMENT/PLAN:   1. Chronic pain of both knees  Discussed role of weight loss  Continue follow-up with Physiatry    2. Periodic headache syndrome, not intractable  - increase hydration and rest as tolerated  - continue Tylenol/NSAIDs as needed  - discussed red flags for urgent reevaluation  - to call with any questions/concerns  Forms completed, scanned to chart    Pt verbalized understanding and agrees with plan.    No orders of the defined types were placed in this encounter.      Meds This Visit:  Requested Prescriptions      No prescriptions requested or ordered in this encounter       Imaging & Referrals:  None         ID#[id number]

## 2024-06-24 NOTE — PATIENT INSTRUCTIONS
Recommendations/goals:    Keep it going goals:   1.  Continue to keep a food record, My Net Diary, select the macros dashboard.  2.  Continue to consume snacks.  For example, peanuts, hard boiled eggs, protein shake/bar, protein smoothie, lunch meat with broccoli, carrots, cucumbers, pineapple, cassie, watermelon, berries. Strive to consume at least 4-6 meals/snacks per day.  Include protein and produce when you eat.  Aim for  grams of protein per day.  Try to keep the carbohydrates at 100-120 grams per day or less.  Try the recipes for getting in protein with produce.  3.  Wait 30 minutes after eating to drink.  Continue to practice eating strategies, eat separately from drinking, avoid straws, chew food 20-30 times before swallowing.  Make the meals last 30 minutes.  4.  Continue to drink at least aim for 64 oz per day of water.  (Try  Protein water, adding True Lemon, Crystal Light).  5. Continue to exercise with a goal of 30 minutes per day for exercise (for example,walking).    6.  Add the strength train at least 10 minutes 3 days per week.   (7 minute workout song on YouTube).     Work in progress goals:    Line up your protein supplements for liquid protein, for example 5 per day of Slim Fast protein shake.    Questions?  Email [email or web address]

## 2024-06-26 NOTE — PROGRESS NOTES
[facility], [facility], [place]  [address]  [facility] [address]  Dept: [phone]    2024   Bariatric Patient Follow-up Evaluation    Chief Complaint:  Morbid obesity     History of Present Illness:  Dilcia Garcia is a 34 year old-female presenting for surgical follow-up.  Today she weighs 255 pounds which is 9 less than last visit.  No changes to her health since the last visit.    Past Medical History:    Past Medical History:    Abnormal Pap smear of cervix    HPV+. Colpo-2013.    Chlamydia    COVID-19 affecting pregnancy in second trimester ([facility])    Positive test 2020. Did not take Lovenox.     Decorative tattoo    Genital herpes simplex    Gestational diabetes mellitus (GDM) requiring insulin ([facility])    Insulin    Gonorrhea    treated    Heart murmur    Had a heart murmur when she was born until 10 years.  Does not take any medicine.    History of chicken pox    History of  delivery    26-28 wk. Delivered for severe preeclampsia, IUGR.     History of prior pregnancy with IUGR     Hypertension    Had pre clampsia during pregnancy and pt was induced to have her baby.     Low back pain during pregnancy in first trimester ([facility])    Hospitalized with severe low back pain in 1st trimester of pregnancy. Could not walk without morphine. Physical therapy.     Migraine    Morbid obesity with BMI of 45.0-49.9, adult ([facility])    Pre-preg BMI 47.5     Obesity    DANIA on CPAP    Periodic headache syndrome, not intractable    Preeclampsia, third trimester ([facility])    BP elevated at 28+ wk. Elevated urine protein 814 mg at 29w0d (3/19/2021). (3/18/2021) NEGATIVE lupus anticoagulant, beta 2 glycoprotein Ab, anticardiolipin.      Primary osteoarthritis of left knee    Severe preeclampsia (HCC)    Delivered at 28 wk at [place]        Past Surgical History:    Past Surgical History:   Procedure Laterality Date           at 28 wk for  severe pre-eclampsia - [place]       2014      2021    Repeat LTCS at 32w0d - oligohydramnios, decreased fetal movement, preeclampsia with severe features Dr. Emi Benson, [facility].     Colposcopy, cervix w/upper adjacent vagina; w/biopsy(s), cervix  2013     Childhood umbilical hernia repair, no mesh reported    Family History:  Grandparents had DM and lung cancer (smokers)    Social History:    Social History     Socioeconomic History    Marital status:    Tobacco Use    Smoking status: Never    Smokeless tobacco: Never   Vaping Use    Vaping status: Never Used   Substance and Sexual Activity    Alcohol use: Not Currently     Alcohol/week: 0.0 standard drinks of alcohol     Comment: SOCIALLY    Drug use: No    Sexual activity: Not Currently     Partners: Male   Other Topics Concern    Caffeine Concern No    Exercise Yes    Special Diet No    Weight Concern Yes    Pt has a pacemaker No    Pt has a defibrillator No    Reaction to local anesthetic No       Medications:   Current Outpatient Medications:     aprepitant (EMEND) 40 MG Oral Cap, Take 2 capsules (80 mg total) by mouth one time for 1 dose. 3-4 hours before surgery, Disp: 2 capsule, Rfl: 0    acyclovir (ZOVIRAX) 5 % External Ointment, acyclovir (ZOVIRAX) 5 % External Ointment, [RxNorm: 269997], Disp: , Rfl:     tacrolimus 0.1 % External Ointment, tacrolimus 0.1 % External Ointment, [RxNorm: 333516], Disp: , Rfl:     Meloxicam 15 MG Oral Tab, Take 1 tablet (15 mg total) by mouth daily., Disp: 30 tablet, Rfl: 0    Phentermine HCl 37.5 MG Oral Cap, Take 1 capsule (37.5 mg total) by mouth every morning., Disp: 30 capsule, Rfl: 2    Tirzepatide-Weight Management (ZEPBOUND) 2.5 MG/0.5ML Subcutaneous Solution Auto-injector, Inject 2.5 mg into the skin once a week. (Patient not taking: Reported on 2024), Disp: 3 mL, Rfl: 2    hydroCHLOROthiazide 12.5 MG Oral Tab, Take 1 tablet (12.5 mg total) by mouth daily., Disp:  90 tablet, Rfl: 3    Clindamycin Phosphate 1 % External Gel, Apply 1 Application topically nightly., Disp: 60 g, Rfl: 3    ibuprofen 800 MG Oral Tab, Take 1 tablet (800 mg total) by mouth every 6 (six) hours as needed for Pain., Disp: , Rfl:      Allergies:    Allergies   Allergen Reactions    Povidone Iodine RASH       ROS:          Constitutional: negative  HEENT: negative  Respiratory: negative  Cardiovascular: negative  Gastrointestinal: negative  Genitourinary: negative  Musculoskeletal: negative  Neurological: negative  Psychological: negative  Endocrine: negative  Immunologic: negative  Integumentary: negative        Physical Exam:  Vital Signs:  /80   Pulse 85   Ht 5' 4\" (1.626 m)   Wt 255 lb 11.2 oz (116 kg)   LMP 05/24/2024 (Exact Date)   SpO2 100%   BMI 43.89 kg/m²   BMI:  Body mass index is 43.89 kg/m².  General: no acute distress, well-nourished  HENT: normocephalic, atraumatic  Lung: breathing comfortably, symmetrical expansion, clear to ausculation bilaterally, no wheezing  Heart: regular rate and rhythm  Abdomen: soft, obese, non-tender, non-distended, no hernia/mass/organomegaly, prior pfannenstiel and umbilical incision from childhood hernia repair, some laxity there but no definite recurrence or incisional hernia  Extremities: normal strength, normal ROM, walks without assist device  Neuro: no focal deficits, cranial nerves grossly intact  Psych: alert and oriented, normal affect  Skin: warm, dry    Assessment: 34 year old  female with chronic morbid obesity, third restart, pursuing sleeve gastrectomy.    Dietitian -cleared  Cardiology -cleared  Pulmonology -cleared  Psych -cleared  Bariatrician -Lexi  Labs - low vit D, rx sent    Plan:     The patient appears to be an excellent candidate for bariatric surgery.  Patient has opted for sleeve gastrectomy.  Patient has completed the routine pre-operative cardiac, pulmonary, nutrition, and psychology evaluations.  I have no further  concerns.  Risks of the surgery including but not limited to bleeding, infection, VTE, leak, ulceration, hernias, failure to achieve desired weight loss, acid reflux, and damage to adjacent structures were discussed with the patient who wishes to proceed.  Possibility of encountering a significant hiatal hernia that would require repair with associated risk of re-herniation discussed as well.  On the other hand, a hiatal hernia evaluated at the time of laparoscopy might not be repaired if felt to be small, clinically not significant, or if technical factors preclude a safe repair.    I have discussed with the patient through a lengthy preoperative process and they verbalize understanding regarding adherence to post-operative regimen, willingness to comply with lifestyle and behavioral modifications, ongoing nutrition recommendations, increased physical activity and exercise, participation in support group, and to make and maintain choices that will improve overall health.  We will be submitting the case to insurance company for approval with plans for surgery on 8/19/2024.  She will follow-up in July for final preoperative visit.    2023 QI PROJECT QUESTIONNAIRE  Do you have a history of motion sickness and/or postoperative nausea /vomiting?   yes    *If NO, then prescribe aprepitant 40mg x1 - should take 3-4 hours prior to scheduled OR start time.     *If YES, then:  1. prescribe aprepitant 80mg x1 - should take 3-4 hours prior to scheduled OR start time.   2. Patient is noted to be high risk and should get order for scopolamine patch to be placed on arrival to Same Day surgery        Pedro Larose MD, 06/26/24, 12:16 PM

## 2024-06-28 NOTE — TELEPHONE ENCOUNTER
Received [employer] message from patient asking for a revision on her Family Medical Leave Act forms, forms originally completed & hand signed by providers office.    Forms revised & uploaded to patient's [employer]. Unable to fax, no employer info or valid auth on file.    [employer] message sent to patient.

## 2024-07-01 NOTE — PATIENT INSTRUCTIONS
Outpatient Encounter Medications as of 7/1/2024   Medication Sig Note    acyclovir (ZOVIRAX) 5 % External Ointment acyclovir (ZOVIRAX) 5 % External Ointment, [RxNorm: 091795] 7/1/2024: Take as needed    tacrolimus 0.1 % External Ointment tacrolimus 0.1 % External Ointment, [RxNorm: 797996]     [DISCONTINUED] Meloxicam 15 MG Oral Tab Take 1 tablet (15 mg total) by mouth daily.     [DISCONTINUED] Phentermine HCl 37.5 MG Oral Cap Take 1 capsule (37.5 mg total) by mouth every morning. 7/1/2024: Discontinue two weeks prior to bariatric surgery    [DISCONTINUED] Tirzepatide-Weight Management (ZEPBOUND) 2.5 MG/0.5ML Subcutaneous Solution Auto-injector Inject 2.5 mg into the skin once a week. (Patient not taking: Reported on 5/23/2024)     hydroCHLOROthiazide 12.5 MG Oral Tab Take 1 tablet (12.5 mg total) by mouth daily. 7/1/2024: Discontinue two weeks prior to bariatric surgery    [DISCONTINUED] Clindamycin Phosphate 1 % External Gel Apply 1 Application topically nightly.     [DISCONTINUED] ibuprofen 800 MG Oral Tab Take 1 tablet (800 mg total) by mouth every 6 (six) hours as needed for Pain.      No facility-administered encounter medications on file as of 7/1/2024.

## 2024-07-01 NOTE — PROGRESS NOTES
[facility], [facility], [place]  [address]  [facility] [address]  Dept: [phone]             Patient:  Dilcia Garcia  :      1990  MRN:      [id number]    Chief Complaint:    Chief Complaint   Patient presents with    Follow - Up    Weight Management     Weight check        SUBJECTIVE     History of Present Illness:  Dilcia is being seen today for a follow-up for pre-surgical weight loss management    Past Medical History:   Past Medical History:    Abnormal Pap smear of cervix    HPV+. Colpo-2013.    Chlamydia    COVID-19 affecting pregnancy in second trimester ([facility])    Positive test 2020. Did not take Lovenox.     Decorative tattoo    Genital herpes simplex    Gestational diabetes mellitus (GDM) requiring insulin ([facility])    Insulin    Gonorrhea    treated    Heart murmur    Had a heart murmur when she was born until 10 years.  Does not take any medicine.    History of chicken pox    History of  delivery    26-28 wk. Delivered for severe preeclampsia, IUGR.     History of prior pregnancy with IUGR     Hypertension    Had pre clampsia during pregnancy and pt was induced to have her baby.     Low back pain during pregnancy in first trimester ([facility])    Hospitalized with severe low back pain in 1st trimester of pregnancy. Could not walk without morphine. Physical therapy.     Migraine    Morbid obesity with BMI of 45.0-49.9, adult ([facility])    Pre-preg BMI 47.5     Obesity    DANIA on CPAP    Periodic headache syndrome, not intractable    Preeclampsia, third trimester ([facility])    BP elevated at 28+ wk. Elevated urine protein 814 mg at 29w0d (3/19/2021). (3/18/2021) NEGATIVE lupus anticoagulant, beta 2 glycoprotein Ab, anticardiolipin.      Primary osteoarthritis of left knee    Severe preeclampsia ([facility])    Delivered at 28 wk at [place]         Comorbidities:  Sleep apnea-Improvement?  yes    OBJECTIVE     Vitals: /76   Pulse 88   Ht  5' 4\" (1.626 m)   Wt 256 lb 1.6 oz (116.2 kg)   LMP 05/24/2024 (Exact Date)   SpO2 97%   BMI 43.96 kg/m²     Initial weight loss:-02   Total weight loss:+15   Start weight: 241    Wt Readings from Last 3 Encounters:   07/01/24 256 lb 1.6 oz (116.2 kg)   06/26/24 255 lb 11.2 oz (116 kg)   06/24/24 258 lb 12.8 oz (117.4 kg)       Patient Medications:    Current Outpatient Medications   Medication Sig Dispense Refill    acyclovir (ZOVIRAX) 5 % External Ointment acyclovir (ZOVIRAX) 5 % External Ointment, [RxNorm: 997218]      tacrolimus 0.1 % External Ointment tacrolimus 0.1 % External Ointment, [RxNorm: 395366]      hydroCHLOROthiazide 12.5 MG Oral Tab Take 1 tablet (12.5 mg total) by mouth daily. 90 tablet 3     Allergies:  Povidone iodine     Social History:    Social History     Socioeconomic History    Marital status:      Spouse name: Not on file    Number of children: Not on file    Years of education: Not on file    Highest education level: Not on file   Occupational History    Not on file   Tobacco Use    Smoking status: Never    Smokeless tobacco: Never   Vaping Use    Vaping status: Never Used   Substance and Sexual Activity    Alcohol use: Not Currently     Alcohol/week: 0.0 standard drinks of alcohol     Comment: SOCIALLY    Drug use: No    Sexual activity: Not Currently     Partners: Male   Other Topics Concern    Caffeine Concern No    Exercise Yes    Seat Belt Not Asked    Special Diet No    Stress Concern Not Asked    Weight Concern Yes    Grew up on a farm Not Asked    History of tanning Not Asked    Outdoor occupation Not Asked    Pt has a pacemaker No    Pt has a defibrillator No    Breast feeding Not Asked    Reaction to local anesthetic No   Social History Narrative    The patient does not use an assistive device..      The patient does  live in a home with stairs.     Social Determinants of Health     Financial Resource Strain: Not on file   Food Insecurity: Not on file   Transportation  Needs: Not on file   Physical Activity: Not on file   Stress: Not on file   Social Connections: Not on file   Housing Stability: Not on file     Surgical History:    Past Surgical History:   Procedure Laterality Date           at 28 wk for severe pre-eclampsia - [place]       2014      2021    Repeat LTCS at 32w0d - oligohydramnios, decreased fetal movement, preeclampsia with severe features Dr. Emi Benson, [facility].     Colposcopy, cervix w/upper adjacent vagina; w/biopsy(s), cervix  2013     Family History:  History reviewed. No pertinent family history.    Food Journal  Reviewed and Discussed:       Patient has a Food Journal?: no   Patient is reading nutrition labels?  yes  Average Caloric Intake:   Unknown  Average CHO Intake: ~100  Is patient exercising? yes  Type of exercise? ADLs    Eating Habits  Patient states the following:  Eats 3 meal(s) per day  Length of time it takes to consume a meal:  20 min  # of snacks per day: 1 Type of snacks:  Cereal, fruit  Amount of soda consumption per day:    Amount of water (in ounces) per day:  48oz  Drinking between meals only:  yes  Toughest challenge:  Exercise, cutting out carbs, ice cream cravings    Nutritional Goals  Limit carbohydrates to 100 gms per day, Eat 100-200 calories within 1 hour of waking  and Eat 3-4 cups of fresh fruits or vegetables daily    Behavior Modifications Reviewed and Discussed  Eat breakfast, Eat 3 meals per day, Plan meals in advance, Read nutrition labels, Drink 64 oz of water per day, Maintain a daily food journal, No drinking 30 minutes before or after meals, Utlize portion control strategies to reduce calorie intake, Identify triggers for eating and manage cues and Eat slowly and take 20 to 30 minutes to complete each meal    Exercise Goals Reviewed and Discussed    Walk for  30 Minutes    ROS:    Review of Systems   Constitutional: Negative.  Negative for activity change,  appetite change, chills and fatigue.   HENT: Negative.     Respiratory: Negative.  Negative for cough, shortness of breath and wheezing.    Cardiovascular: Negative.  Negative for chest pain, palpitations and leg swelling.   Gastrointestinal: Negative.  Negative for abdominal distention, abdominal pain, diarrhea, nausea and vomiting.   Genitourinary: Negative.  Negative for difficulty urinating, dysuria and frequency.   Musculoskeletal: Negative.  Negative for arthralgias, back pain and gait problem.   Skin: Negative.    Neurological: Negative.    Psychiatric/Behavioral: Negative.         Physical Exam:     Physical Exam   Constitutional: She is oriented to person, place, and time. She appears well-developed and well-nourished.   HENT:   Head: Normocephalic and atraumatic.   Neck: Neck supple.   Pulmonary/Chest: Effort normal.   Abdominal: Soft.   Musculoskeletal: Normal range of motion.   Neurological: She is alert and oriented to person, place, and time.   Skin: Skin is warm and dry.   Psychiatric: She has a normal mood and affect. Her behavior is normal. Judgment and thought content normal.   Vitals reviewed.    ASSESSMENT     OBSTRUCTIVE SLEEP APNEA: The patient states her sleep apnea has been stable since the last clinic visit. There has not been any increase in hyper-somnolence.     Encounter Diagnosis(ses):   Encounter Diagnoses   Name Primary?    Essential hypertension Yes    Encounter for therapeutic drug monitoring     DANIA on CPAP     Morbid obesity with BMI of 40.0-44.9, adult (HCC)        PLAN   Discussed with patient the risks and benefits of  VSG. The patient is interested in bariatric surgery and will continue our presurgical process.       Goals for next month:  1. Keep a food log.  2. Drink 48-64 ounces of non-caloric beverages per day. No fruit juices or regular soda.  3. Increase activity-upper body exercises, walk 10 minutes per day.  4. Increase fruit and vegetable servings to 5-6 per day.        OBSTRUCTIVE SLEEP APNEA: The patient states her sleep apnea has been stable since the last clinic visit. There has not been any increase in hyper-somnolence.       VSG scheduled 08/19/2024  Dr Larose    Will go over pre and post op meds    No orders of the defined types were placed in this encounter.    Outpatient Encounter Medications as of 7/1/2024   Medication Sig Note    acyclovir (ZOVIRAX) 5 % External Ointment acyclovir (ZOVIRAX) 5 % External Ointment, [RxNorm: 435169] 7/1/2024: Take as needed    tacrolimus 0.1 % External Ointment tacrolimus 0.1 % External Ointment, [RxNorm: 748503]     [DISCONTINUED] Meloxicam 15 MG Oral Tab Take 1 tablet (15 mg total) by mouth daily.     [DISCONTINUED] Phentermine HCl 37.5 MG Oral Cap Take 1 capsule (37.5 mg total) by mouth every morning. 7/1/2024: Discontinue two weeks prior to bariatric surgery    [DISCONTINUED] Tirzepatide-Weight Management (ZEPBOUND) 2.5 MG/0.5ML Subcutaneous Solution Auto-injector Inject 2.5 mg into the skin once a week. (Patient not taking: Reported on 5/23/2024)     hydroCHLOROthiazide 12.5 MG Oral Tab Take 1 tablet (12.5 mg total) by mouth daily. 7/1/2024: Discontinue two weeks prior to bariatric surgery    [DISCONTINUED] Clindamycin Phosphate 1 % External Gel Apply 1 Application topically nightly.     [DISCONTINUED] ibuprofen 800 MG Oral Tab Take 1 tablet (800 mg total) by mouth every 6 (six) hours as needed for Pain.      No facility-administered encounter medications on file as of 7/1/2024.                Joe Elliott MD

## 2024-07-03 NOTE — TELEPHONE ENCOUNTER
Can resend Augmentin but if she is having recurrent symptoms, she may need other antibiotics for resistance.  Please assist with ENT followup.

## 2024-07-03 NOTE — ED PROVIDER NOTES
Patient Seen in: [facility] Emergency Department    History     Chief Complaint   Patient presents with    Sore Throat       HPI    34-year-old female presents ER with complaint of sore throat for the past day.  Patient with a past medical history of peritonsillar abscess 1 month ago.  Patient states at that time she was having extreme difficulty swallowing and pain.  Patient states she came to the ER today because she did not want to get to the point where she needed to be drained.  Patient controlling her secretions.  Patient satting 100%.  Patient denies any fevers.    History reviewed.   Past Medical History:    Abnormal Pap smear of cervix    HPV+. Colpo-2013.    Chlamydia    COVID-19 affecting pregnancy in second trimester ([facility])    Positive test 2020. Did not take Lovenox.     Decorative tattoo    Genital herpes simplex    Gestational diabetes mellitus (GDM) requiring insulin ([facility])    Insulin    Gonorrhea    treated    Heart murmur    Had a heart murmur when she was born until 10 years.  Does not take any medicine.    History of chicken pox    History of  delivery    26-28 wk. Delivered for severe preeclampsia, IUGR.     History of prior pregnancy with IUGR     Hypertension    Had pre clampsia during pregnancy and pt was induced to have her baby.     Low back pain during pregnancy in first trimester ([facility])    Hospitalized with severe low back pain in 1st trimester of pregnancy. Could not walk without morphine. Physical therapy.     Migraine    Morbid obesity with BMI of 45.0-49.9, adult ([facility])    Pre-preg BMI 47.5     Obesity    DANIA on CPAP    Periodic headache syndrome, not intractable    Preeclampsia, third trimester ([facility])    BP elevated at 28+ wk. Elevated urine protein 814 mg at 29w0d (3/19/2021). (3/18/2021) NEGATIVE lupus anticoagulant, beta 2 glycoprotein Ab, anticardiolipin.      Primary osteoarthritis of left knee    Severe preeclampsia (HCC)    Delivered at 28 wk at [place]         History reviewed.   Past Surgical History:   Procedure Laterality Date           at 28 wk for severe pre-eclampsia - [place]       2014      2021    Repeat LTCS at 32w0d - oligohydramnios, decreased fetal movement, preeclampsia with severe features Dr. Emi Benson, [facility].     Colposcopy, cervix w/upper adjacent vagina; w/biopsy(s), cervix  2013         Medications :  (Not in a hospital admission)       No family history on file.    Smoking Status:   Social History     Socioeconomic History    Marital status: Legally    Tobacco Use    Smoking status: Never    Smokeless tobacco: Never   Vaping Use    Vaping status: Never Used   Substance and Sexual Activity    Alcohol use: Not Currently     Alcohol/week: 0.0 standard drinks of alcohol     Comment: SOCIALLY    Drug use: No    Sexual activity: Not Currently     Partners: Male   Other Topics Concern    Caffeine Concern No    Exercise Yes    Special Diet No    Weight Concern Yes    Pt has a pacemaker No    Pt has a defibrillator No    Reaction to local anesthetic No       ROS  All pertinent positives for the review of systems are mentioned in the HPI  All other organ systems are reviewed and are negative.    Constitutional and vital signs reviewed.      Social History and Family History elements reviewed from today, pertinent positives to the presenting problem noted.    Physical Exam     ED Triage Vitals [24 1543]   BP (!) 145/91   Pulse 92   Resp 20   Temp 98.5 °F (36.9 °C)   Temp src    SpO2 99 %   O2 Device        All measures to prevent infection transmission during my interaction with the patient were taken. The patient was already wearing a droplet mask on my arrival to the room. Personal protective equipment including droplet mask, eye protection, and gloves were worn throughout the duration of the exam.  Handwashing was performed prior to and after the exam.  Stethoscope and any  equipment used during my examination was cleaned with super sani-cloth germicidal wipes following the exam.     Physical Exam  Vitals and nursing note reviewed.   Constitutional:       Appearance: She is well-developed.   HENT:      Head: Normocephalic and atraumatic.      Right Ear: External ear normal.      Left Ear: External ear normal.      Nose: Nose normal.      Mouth/Throat:        Comments: , mild erythema, no drainage or pus.  Eyes:      Conjunctiva/sclera: Conjunctivae normal.      Pupils: Pupils are equal, round, and reactive to light.   Cardiovascular:      Rate and Rhythm: Normal rate and regular rhythm.      Heart sounds: Normal heart sounds.   Pulmonary:      Effort: Pulmonary effort is normal.      Breath sounds: Normal breath sounds.   Abdominal:      General: Bowel sounds are normal.      Palpations: Abdomen is soft.   Musculoskeletal:         General: Normal range of motion.      Cervical back: Normal range of motion and neck supple.   Skin:     General: Skin is warm and dry.   Neurological:      Mental Status: She is alert and oriented to person, place, and time.      Deep Tendon Reflexes: Reflexes are normal and symmetric.   Psychiatric:         Behavior: Behavior normal.         Thought Content: Thought content normal.         Judgment: Judgment normal.         ED Course        Labs Reviewed   POCT RAPID STREP - Normal         Imaging Results Available and Reviewed while in ED: No results found.  ED Medications Administered:   Medications   dexAMETHasone PF (Decadron) 10 MG/ML injection 10 mg (10 mg Intramuscular Given 7/3/24 1628)   ketorolac (Toradol) 30 MG/ML injection 30 mg (30 mg Intramuscular Given 7/3/24 1628)         MDM     Vitals:    07/03/24 1543   BP: (!) 145/91   Pulse: 92   Resp: 20   Temp: 98.5 °F (36.9 °C)   SpO2: 99%     *I personally reviewed and interpreted all ED vitals.  I also personally reviewed all labs and imaging if ordered    Pulse Ox: 99%, Room air, Normal      Monitor Interpretation:   normal sinus rhythm    Differential Diagnosis/ Diagnostic Considerations: Strep pharyngitis, peritonsillar abscess, allergic reaction, viral pharyngitis.    Medical Record Review: I personally reviewed available prior medical records for any recent pertinent discharge summaries, testing, and procedures and reviewed those reports.    Complicating Factors: The patient already has does not have any pertinent problems on file. to contribute to the complexity of this ED evaluation.    Medical Decision Making  34-year-old female presents ER with complaints of a sore throat.  Patient with positive swelling and fullness to the back of her left throat.  Patient states symptoms just started today.  Patient without any fevers, difficulty with secretions or shortness of breath.  Patient given a shot of Decadron and Toradol in the ER which she states made her pain better.  Patient discharged home with Augmentin for the next 10 days instructed to follow-up with ENT if her symptoms do not improve    Problems Addressed:  Peritonsillar abscess: acute illness or injury    Amount and/or Complexity of Data Reviewed  External Data Reviewed: notes.     Details: This notes reviewed.  Patient with history of peritonsillar abscess that required IV antibiotics and drainage 1 month ago.  At that time patient had difficulty tolerating her secretions.    Risk  Prescription drug management.        Condition upon leaving the department: Stable    Disposition and Plan     Clinical Impression:  1. Peritonsillar abscess        Disposition:  Discharge    Follow-up:  Moriah Atkins MD  [address] [place] [id number]  [phone]    Schedule an appointment as soon as possible for a visit  If symptoms worsen      Medications Prescribed:  Current Discharge Medication List        START taking these medications    Details   Ketorolac Tromethamine 10 MG Oral Tab Take 1 tablet (10 mg total) by mouth every 6  (six) hours as needed.  Qty: 14 tablet, Refills: 0

## 2024-07-03 NOTE — TELEPHONE ENCOUNTER
Action Requested: Summary for Provider     []  Critical Lab, Recommendations Needed  [x] Need Additional Advice  []   FYI    []   Need Orders  [] Need Medications Sent to Pharmacy  []  Other     SUMMARY: Dr Montenegro: any possibility she can be added on today or send antibiotic?   Patient may need to see ENT.    Reason for call: Sore Throat; history of left side abcess  Onset: today    Patient today started to feel symptoms of throat pain; Hurts to swallow, sore throat. Feels tenderness on left side of neck.   No fever, no short of breathe.  Feels mucusy in throat. Denied drooling.    A month ago patient went to ER 5/23/24 for strep; severe bilateral tonsillits with left side abscess.(CT scan done)  She was given augmentin 875/125mg.     Was on amoxicillin 500mg on 5/15/24 and was not helping.     Patient was to follow up with ENT but didn't.   Patient is requesting antibiotic if possible.     Reason for Disposition   Patient wants to be seen    Protocols used: Sore Throat-A-OH

## 2024-07-03 NOTE — ED INITIAL ASSESSMENT (HPI)
Patient arrives ambulatory through triage with c/o of sore throat that started this morning at 0200.

## 2024-07-03 NOTE — TELEPHONE ENCOUNTER
Requested Medication Additions Start Date Reported By  Comments   AUGMENTIN (Amoxicillin-Pot Clavulanate) 125-31.25 MG/5ML Susr 7/3/2024 Dilcia Cuevas. I'm requesting this medication because I think this is the  medication and dosage that was given to me when I went into the ER due to a abscess on my tonsil and severe strep throat. I'm having the same symptoms that I was having before with not being able to swallow, tonsils feel swollen and sore throat on one side. Can you please look into the system and see if this is the correct prescription and dosage that the ER doctor gave me. Need today if possible, but if I need another ER visit I will do today.

## 2024-07-04 NOTE — TELEPHONE ENCOUNTER
I reviewed patient's chart, she presented to the [facility] ED yesterday, 7/3/24 and received an Augmentin rx upon discharge and advice to follow up with ENT (Dr. Moriah Atkins) if s/s don't improve or if they get worse.      Moriah Atkins MD  [address] [place] [id number]  [phone]

## 2024-07-19 NOTE — PROGRESS NOTES
[facility]  [address]  [facility] [address]  Dept: [phone]  Loc: [phone]    07/19/24      Bariatric Follow-up Nutrition Session    Dilcia Garcia is a 34 year old female.     Assessment     Procedure:  Vertical Sleeve Gastrectomy  Here for medical weight management: yes  Revision:  n/a  Surgery Date:  8/19/24  Medical Diagnosis:  obesity grade III, HTN, DANIA on Cpap    Labs:  Lab Results   Component Value Date    GLU 67 (L) 03/08/2024    BUN 13 03/08/2024    BUNCREA 13.8 03/08/2024    CREATSERUM 0.94 03/08/2024    ANIONGAP 6 03/08/2024    GFRNAA 88 12/09/2021    GFRAA 101 12/09/2021    CA 9.7 03/08/2024    OSMOCALC 290 03/08/2024    ALKPHO 68 03/08/2024    AST 15 03/08/2024    ALT 10 03/08/2024    BILT 0.6 03/08/2024    TP 7.8 03/08/2024    ALB 4.4 03/08/2024    GLOBULIN 3.4 03/08/2024     03/08/2024    K 3.7 03/08/2024     03/08/2024    CO2 28.0 03/08/2024     Lab Results   Component Value Date    MG 5.8 (HH) 04/10/2021      Phosphorus (mg/dL)   Date Value   09/09/2020 2.4 (L)   04/18/2018 3.1       Thyroid:    Lab Results   Component Value Date    TSH 0.958 03/08/2024    TSH 1.020 08/17/2022    TSH 0.798 12/09/2021       Iron Panel:  Lab Results   Component Value Date    IRON 60 03/08/2024    TRANSFERRIN 243 (L) 03/08/2024    TIBCP 362 03/08/2024    SAT 17 03/08/2024       CBC:  Lab Results   Component Value Date    WBC 5.5 03/08/2024    WBC 4.7 08/25/2023    WBC 6.3 08/17/2022     Lab Results   Component Value Date    HEMOGLOBIN 13.4 05/23/2024    HGB 13.3 03/08/2024    HGB 13.2 08/25/2023    HGB 13.3 08/17/2022      Lab Results   Component Value Date    .0 03/08/2024    .0 08/25/2023    .0 08/17/2022       Diabetes:    Lab Results   Component Value Date     03/08/2024    A1C 5.4 03/08/2024       Lipid Panel:  Lab Results   Component Value Date    CHOLEST 195 03/08/2024    TRIG 85 03/08/2024     HDL 51 03/08/2024     (H) 03/08/2024    VLDL 15 03/08/2024    NONHDLC 144 (H) 03/08/2024        Vitamins/Minerals:  Lab Results   Component Value Date    B12 780 03/08/2024     Lab Results   Component Value Date    VITD 23.0 (L) 03/08/2024     Lab Results   Component Value Date/Time    THIAMINE 109 08/17/2022 08:17 AM      Lab Results   Component Value Date/Time    VITB1 109.2 03/08/2024 11:35 AM     Lab Results   Component Value Date/Time    FOLIC 20.6 03/08/2024 11:35 AM        Meds:     Current Outpatient Medications:     acyclovir (ZOVIRAX) 5 % External Ointment, acyclovir (ZOVIRAX) 5 % External Ointment, [RxNorm: 006371], Disp: , Rfl:     tacrolimus 0.1 % External Ointment, tacrolimus 0.1 % External Ointment, [RxNorm: 978976], Disp: , Rfl:     hydroCHLOROthiazide 12.5 MG Oral Tab, Take 1 tablet (12.5 mg total) by mouth daily., Disp: 90 tablet, Rfl: 3    Height:    Ht Readings from Last 1 Encounters:   07/01/24 5' 4\" (1.626 m)     Weight:    Wt Readings from Last 6 Encounters:   07/01/24 256 lb 1.6 oz (116.2 kg)   06/26/24 255 lb 11.2 oz (116 kg)   06/24/24 258 lb 12.8 oz (117.4 kg)   06/24/24 256 lb 12.8 oz (116.5 kg)   06/21/24 255 lb (115.7 kg)   05/24/24 256 lb 6.4 oz (116.3 kg)       Weight change:  up 2.8 lbs since LOV last month  Post-Op Excess Body Weight Loss: n/a% EBWL  BMI: There is no height or weight on file to calculate BMI.    Protein Intake: 70 grams/day  Carbs: 159 gm/day  Fluid intake:  64-80 ounces/day    Diet history:       Breakfast      AM Snack       Lunch     PM Snack     Dinner   Sausage and eggs Greek yogurt or nuts Chicken veggies and rice Pickle and deli meat  Ground beef and veggies             Total Calories:  8361-2172  Excessive in: nothing  Inadequate in:  nothing     Patient has made some modifications and adjustments to diet: yes, per pt is eating separately from drinking-waiting 60 minutes after eating to drink, is chewing food well before swallowing, eating more  slowly, is eating smaller portions, is eating more protein, is eating at least 6 times per day-used to be 3 times per day,  is eating 1-2 servings of fruit per day and 4-6 servings of vegetables per day, eliminated caffeine and sugary drinks.   Food intolerances:  lactose-diarrhea, gas   Vitamin/mineral supplements:  Other: no  Protein supplements:  Other CorePower and FairLife and SlimFast protein shakes      Activity Level: active  Type: walk and hand weights 2-3 times per week  Duration: 30 minutes  Frequency: per day    Other:  Met with pt for preop visit following LPD instruction visit in May.  Per pt is pursuing VSG with Dr. Larose for the third time, scheduled for 8/19/24. Per pt is not currently taking any medication for weight, feels that is the reason her weight is a little up this month.  Per pt is continuing to keep a food record in [facility] is consuming ~8202-0803 calories, 72-78 gm protein and 110 gm of carbs per day. Per pt is eating separately from drinking-waiting 60 minutes after eating to drink, is chewing food well before swallowing, eating more slowly, is eating smaller portions, is eating more protein, is eating at least 6 times per day-used to be 3 times per day,  is eating 1-2 servings of fruit per day and 4-6 servings of vegetables per day, eliminated caffeine and sugary drinks.  Discussed with pt goal to increase protein to  grams per day.  Provided ideas.  Pt verbalized understanding.  Per pt is drinking at least 64-80 oz per day of water. Per pt is active walking 30 minutes pe day. Per pt is doing strength training 2-3 days per week.  Reviewed LPD instruction.  Answered pt questions.  Pt is ready for surgery from a dietary perspective.  Pt with follow up visit scheduled for three week post op.        Nutrition Diagnosis     Nutrition Diagnosis: Morbid obesity: Improvement shown     Intervention     Recommendations/goals:    Keep it going goals:   1.  Continue to keep a food record, My Net  Diary, select the macros dashboard.  2.  Continue to consume snacks.  For example, peanuts, hard boiled eggs, protein shake/bar, protein smoothie, lunch meat with broccoli, carrots, cucumbers, pineapple, cassie, watermelon, berries. Strive to consume at least 4-6 meals/snacks per day.  Include protein and produce when you eat.  Aim for  grams of protein per day.  Try to keep the carbohydrates at 100-120 grams per day or less.  Try the recipes for getting in protein with produce.  3.  Continue to wait 30 minutes after eating to drink.  Continue to practice eating strategies, eat separately from drinking, avoid straws, chew food 20-30 times before swallowing.  Make the meals last 30 minutes.  4.  Continue to drink at least aim for 64 oz per day of water.  (Try  Protein water, adding True Lemon, Crystal Light).  5. Continue to exercise with a goal of 30 minutes per day for exercise (for example,walking).    6.  Continue to strength train at least 10 minutes 3 days per week.   (7 minute workout song on [employer]).     Work in progress goals:    Line up your protein supplements for liquid protein, for example 5 per day of [place], or Glucerna Hunger Smart or Orgain or OWYN or Slim Fast protein shake.     Questions?  Email [email or web address]      Monitor/Evaluate     Anthropometric measurements, Food/fluid intake/choices, Food intolerances, Activity level, Vitamin/mineral supplementation, Reinforce goals, and Calorie/protein intake  Additional RD visits required to review concepts? yes  Patient understands protein requirements? yes  Patient understand fluid requirements (amount and method of intake)? yes  Patient understands post-operative diet? yes  Patient keeping consistent food records? yes  Patient ready for Liquid Protein Education? Completed 5/24/24 and reviewed today      Nicole Alan, RD, LDN    Face-to-face time spent with pt: 45 minutes

## 2024-07-19 NOTE — PATIENT INSTRUCTIONS
Recommendations/goals:    Keep it going goals:   1.  Continue to keep a food record, My Net Diary, select the macros dashboard.  2.  Continue to consume snacks.  For example, peanuts, hard boiled eggs, protein shake/bar, protein smoothie, lunch meat with broccoli, carrots, cucumbers, pineapple, cassie, watermelon, berries. Strive to consume at least 4-6 meals/snacks per day.  Include protein and produce when you eat.  Aim for  grams of protein per day.  Try to keep the carbohydrates at 100-120 grams per day or less.  Try the recipes for getting in protein with produce.  3.  Continue to wait 30 minutes after eating to drink.  Continue to practice eating strategies, eat separately from drinking, avoid straws, chew food 20-30 times before swallowing.  Make the meals last 30 minutes.  4.  Continue to drink at least aim for 64 oz per day of water.  (Try  Protein water, adding True Lemon, Crystal Light).  5. Continue to exercise with a goal of 30 minutes per day for exercise (for example,walking).    6.  Continue to strength train at least 10 minutes 3 days per week.   (7 minute workout song on YouTube).     Work in progress goals:    Line up your protein supplements for liquid protein, for example 5 per day of [place], or Glucerna Hunger Smart or Orgain or OWYN or Slim Fast protein shake.     Questions?  Email [email or web address]

## 2024-07-24 NOTE — PROGRESS NOTES
[facility] / [facility], [place]  [address]  [facility] [address]  Dept: [phone]    2024     Bariatric Patient Follow-up Evaluation    Chief Complaint:  Morbid obesity     History of Present Illness:  Dilcia Garcia is a 34 year old-female presenting for surgical follow-up.  Today she weighs 262 pounds.  No changes to her health since the last visit.    Past Medical History:    Past Medical History:    Abnormal Pap smear of cervix    HPV+. Colpo-2013.    Chlamydia    COVID-19 affecting pregnancy in second trimester ([facility])    Positive test 2020. Did not take Lovenox.     Decorative tattoo    Genital herpes simplex    Gestational diabetes mellitus (GDM) requiring insulin ([facility])    Insulin    Gonorrhea    treated    Heart murmur    Had a heart murmur when she was born until 10 years.  Does not take any medicine.    History of chicken pox    History of  delivery    26-28 wk. Delivered for severe preeclampsia, IUGR.     History of prior pregnancy with IUGR     Hypertension    Had pre clampsia during pregnancy and pt was induced to have her baby.     Low back pain during pregnancy in first trimester ([facility])    Hospitalized with severe low back pain in 1st trimester of pregnancy. Could not walk without morphine. Physical therapy.     Migraine    Morbid obesity with BMI of 45.0-49.9, adult ([facility])    Pre-preg BMI 47.5     Obesity    DANIA on CPAP    Periodic headache syndrome, not intractable    Preeclampsia, third trimester ([facility])    BP elevated at 28+ wk. Elevated urine protein 814 mg at 29w0d (3/19/2021). (3/18/2021) NEGATIVE lupus anticoagulant, beta 2 glycoprotein Ab, anticardiolipin.      Primary osteoarthritis of left knee    Severe preeclampsia (HCC)    Delivered at 28 wk at [place]        Past Surgical History:    Past Surgical History:   Procedure Laterality Date           at 28 wk for severe pre-eclampsia - [place]        2014      2021    Repeat LTCS at 32w0d - oligohydramnios, decreased fetal movement, preeclampsia with severe features Dr. Emi Benson, [facility].     Colposcopy, cervix w/upper adjacent vagina; w/biopsy(s), cervix  2013     Childhood umbilical hernia repair, no mesh reported    Family History:  Grandparents had DM and lung cancer (smokers)    Social History:    Social History     Socioeconomic History    Marital status: Legally    Tobacco Use    Smoking status: Never    Smokeless tobacco: Never   Vaping Use    Vaping status: Never Used   Substance and Sexual Activity    Alcohol use: Not Currently     Alcohol/week: 0.0 standard drinks of alcohol     Comment: SOCIALLY    Drug use: No    Sexual activity: Not Currently     Partners: Male   Other Topics Concern    Caffeine Concern No    Exercise Yes    Special Diet No    Weight Concern Yes    Pt has a pacemaker No    Pt has a defibrillator No    Reaction to local anesthetic No       Medications:   Current Outpatient Medications:     acyclovir (ZOVIRAX) 5 % External Ointment, acyclovir (ZOVIRAX) 5 % External Ointment, [RxNorm: 697841], Disp: , Rfl:     tacrolimus 0.1 % External Ointment, tacrolimus 0.1 % External Ointment, [RxNorm: 119911], Disp: , Rfl:     hydroCHLOROthiazide 12.5 MG Oral Tab, Take 1 tablet (12.5 mg total) by mouth daily., Disp: 90 tablet, Rfl: 3     Allergies:    Allergies   Allergen Reactions    Povidone Iodine RASH       ROS:          Constitutional: negative  HEENT: negative  Respiratory: negative  Cardiovascular: negative  Gastrointestinal: negative  Genitourinary: negative  Musculoskeletal: negative  Neurological: negative  Psychological: negative  Endocrine: negative  Immunologic: negative  Integumentary: negative        Physical Exam:  Vital Signs:  /80   Pulse 83   Ht 5' 4\" (1.626 m)   Wt 262 lb (118.8 kg)   LMP 2024 (Exact Date)   SpO2 98%   BMI 44.97 kg/m²   BMI:  Body  mass index is 44.97 kg/m².  General: no acute distress, well-nourished  HENT: normocephalic, atraumatic  Lung: breathing comfortably, symmetrical expansion, clear to ausculation bilaterally, no wheezing  Heart: regular rate and rhythm  Abdomen: soft, obese, non-tender, non-distended, no hernia/mass/organomegaly, prior pfannenstiel and umbilical incision from childhood hernia repair, some laxity there but no definite recurrence or incisional hernia  Extremities: normal strength, normal ROM, walks without assist device  Neuro: no focal deficits, cranial nerves grossly intact  Psych: alert and oriented, normal affect  Skin: warm, dry    Assessment: 34 year old  female with chronic morbid obesity, third restart, pursuing sleeve gastrectomy.    Dietitian -cleared  Cardiology -cleared  Pulmonology -cleared  Psych -cleared  Bariatrician -Lexi  Labs - low vit D, rx sent    Plan:     The patient appears to be an excellent candidate for bariatric surgery.  Patient has opted for sleeve gastrectomy.  Patient has completed the routine pre-operative cardiac, pulmonary, nutrition, and psychology evaluations.  I have no further concerns.  Risks of the surgery including but not limited to bleeding, infection, VTE, leak, ulceration, hernias, failure to achieve desired weight loss, acid reflux, and damage to adjacent structures were discussed with the patient who wishes to proceed.  Possibility of encountering a significant hiatal hernia that would require repair with associated risk of re-herniation discussed as well.  On the other hand, a hiatal hernia evaluated at the time of laparoscopy might not be repaired if felt to be small, clinically not significant, or if technical factors preclude a safe repair.    I have discussed with the patient through a lengthy preoperative process and they verbalize understanding regarding adherence to post-operative regimen, willingness to comply with lifestyle and behavioral modifications,  ongoing nutrition recommendations, increased physical activity and exercise, participation in support group, and to make and maintain choices that will improve overall health.  We will be submitting the case to insurance company for approval with plans for surgery on 8/19/2024.        Procedure:   LAGB - 0  Lap RYGB - 3  Lap Sleeve - 4  Open RYGB - 6    Age <30 - 1  30-39 - 2  40-49 - 3  50-59 - 4  60+ - 5    BMI - <40 - 1  40-49 - 2  50-59 - 3  60+ - 4    Male - 2  Smoking history - 2  OR time > 3 hours - 2  Prior history of VTE - 5    Total Score  8    0-14 --> Low risk --> standard prophylaxis  15-19 --> Medium risk --> protocol for postop chemoprophylaxis  20 or greater --> high risk --> consider therapeutic chemoprophylaxis.     Pedro Larose MD, 07/24/24, 12:16 PM
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
## 2024-07-29 NOTE — PROGRESS NOTES
Dilcia Garcia is a 34 year old female.   Chief Complaint   Patient presents with    Throat Problem     Reports to have had strep throat, had a tonsillar abscess. Here for reevaluation        HPI:   34-year-old presents in follow-up regarding her tonsillitis.  She states that she had a peritonsillar abscess drained in the emergency room and her throat is feeling all the way better although she still feels some pain on the sides of her tongue.  She does report beginning to grind her teeth at night    Current Outpatient Medications   Medication Sig Dispense Refill    cyclobenzaprine 10 MG Oral Tab Take 1 tablet (10 mg total) by mouth 3 (three) times daily as needed for Muscle spasms. 20 tablet 0    ibuprofen 600 MG Oral Tab Take 1 tablet (600 mg total) by mouth every 8 (eight) hours as needed for Pain or Fever. 30 tablet 0    acyclovir (ZOVIRAX) 5 % External Ointment acyclovir (ZOVIRAX) 5 % External Ointment, [RxNorm: 391471]      tacrolimus 0.1 % External Ointment tacrolimus 0.1 % External Ointment, [RxNorm: 710567]      hydroCHLOROthiazide 12.5 MG Oral Tab Take 1 tablet (12.5 mg total) by mouth daily. 90 tablet 3      Past Medical History:    Abnormal Pap smear of cervix    HPV+. Colpo-2013.    Chlamydia    COVID-19 affecting pregnancy in second trimester ([facility])    Positive test 2020. Did not take Lovenox.     Decorative tattoo    Genital herpes simplex    Gestational diabetes mellitus (GDM) requiring insulin ([facility])    Insulin    Gonorrhea    treated    Heart murmur    Had a heart murmur when she was born until 10 years.  Does not take any medicine.    History of chicken pox    History of  delivery    26-28 wk. Delivered for severe preeclampsia, IUGR.     History of prior pregnancy with IUGR     Hypertension    Had pre clampsia during pregnancy and pt was induced to have her baby.     Low back pain during pregnancy in first trimester (HCC)    Hospitalized with severe low back pain in 1st  trimester of pregnancy. Could not walk without morphine. Physical therapy.     Migraine    Morbid obesity with BMI of 45.0-49.9, adult ([facility])    Pre-preg BMI 47.5     Obesity    DANIA on CPAP    Periodic headache syndrome, not intractable    Preeclampsia, third trimester ([facility])    BP elevated at 28+ wk. Elevated urine protein 814 mg at 29w0d (3/19/2021). (3/18/2021) NEGATIVE lupus anticoagulant, beta 2 glycoprotein Ab, anticardiolipin.      Primary osteoarthritis of left knee    Severe preeclampsia ([facility])    Delivered at 28 wk at [place]       Social History:  Social History     Socioeconomic History    Marital status: Legally    Tobacco Use    Smoking status: Never    Smokeless tobacco: Never   Vaping Use    Vaping status: Never Used   Substance and Sexual Activity    Alcohol use: Not Currently     Alcohol/week: 0.0 standard drinks of alcohol     Comment: SOCIALLY    Drug use: No    Sexual activity: Not Currently     Partners: Male   Other Topics Concern    Caffeine Concern No    Exercise Yes    Special Diet No    Weight Concern Yes    Pt has a pacemaker No    Pt has a defibrillator No    Reaction to local anesthetic No   Social History Narrative    The patient does not use an assistive device..      The patient does  live in a home with stairs.      Past Surgical History:   Procedure Laterality Date           at 28 wk for severe pre-eclampsia - [place]       2014      2021    Repeat LTCS at 32w0d - oligohydramnios, decreased fetal movement, preeclampsia with severe features Dr. Emi Benson, [facility].     Colposcopy, cervix w/upper adjacent vagina; w/biopsy(s), cervix  2013         EXAM:   LMP 2024 (Exact Date)     System Details   Skin Inspection - Normal.   Constitutional Overall appearance - Normal.   Head/Face Symmetric, TMJ tenderness not present    Eyes EOMI, PERRL   Right ear:  Canal clear, TM intact, no CATHI   Left ear:  Canal clear, TM  intact, no CATHI   Nose: Septum midline, inferior turbinates not enlarged, nasal valves without collapse    Oral cavity/Oropharynx: No lesions or masses on inspection or palpation, tonsils symmetric and 2+ and not inflamed or infected appearing  Slight scalloping of lateral tongue   Neck: Soft without LAD, thyroid not enlarged  Voice clear/ no stridor   Other:      SCOPES AND PROCEDURES:         AUDIOGRAM AND IMAGING:         IMPRESSION:   1. Tonsillitis    2. Bruxism       Recommendations:  -Tonsils appear to be well-healed with no residual symptoms of her throat  -Her lateral tongue pain may be from grinding or clenching her teeth during sleep and biting her tongue.  Discussed she may want to meet with a dentist regarding a nightguard    The patient indicates understanding of these issues and agrees to the plan.  Consult from Dr Nieto regarding throat evaluation    Sriram Angeles MD  7/29/2024  12:10 PM
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
## 2024-08-13 NOTE — TELEPHONE ENCOUNTER
Faxed ov notes from 8/12/24 to Steffany Chan RN at [facility], fax #[phone].

## 2024-08-19 NOTE — ANESTHESIA POSTPROCEDURE EVALUATION
Patient: Dilcia Garcia    Procedure Summary       Date: 08/19/24 Room / Location: [facility] MAIN OR 01 / EM MAIN OR    Anesthesia Start: 1039 Anesthesia Stop: 1152    Procedure: Laparoscopic, gastric sleeve (Abdomen) Diagnosis: (Obesity, BMI, HTN, DANIA, osteoarthritis left knee)    Surgeons: Pedro Larose MD Anesthesiologist: Dannielle Strong MD    Anesthesia Type: general ASA Status: 3            Anesthesia Type: general    Vitals Value Taken Time   /55 08/19/24 1151   Temp 99.7 °F (37.6 °C) 08/19/24 1151   Pulse 83 08/19/24 1151   Resp 15 08/19/24 1151   SpO2 100 % 08/19/24 1151   Vitals shown include unfiled device data.    [facility] AN Post Evaluation:   Patient Evaluated in PACU  Patient Participation: complete - patient participated  Level of Consciousness: awake  Pain Score: 0  Pain Management: adequate  Airway Patency:patent  Dental exam unchanged from preop  Yes    Nausea/Vomiting: none  Cardiovascular Status: acceptable  Respiratory Status: acceptable and nasal cannula  Postoperative Hydration acceptable  Comments: Report to CAL SILVA CRNA  8/19/2024 11:52 AM

## 2024-08-19 NOTE — CONSULTS
[facility]    PATIENT'S NAME: ESTER HUMPHRIES   ATTENDING PHYSICIAN: Pedro Larose MD   CONSULTING PHYSICIAN: Veronica Ha MD   PATIENT ACCOUNT#:   [id number]    LOCATION:  [facility] PACU 3 [facility] 10  MEDICAL RECORD #:   [id number]       YOB: 1990  ADMISSION DATE:       08/19/2024      CONSULT DATE:  08/19/2024    REPORT OF CONSULTATION      REASON FOR ADMISSION:  Post laparoscopic sleeve gastrectomy.    HISTORY OF PRESENT ILLNESS:  The patient is a 34-year-old  female with chronic morbid obesity.  Failed outpatient conservative medical management options.  Scheduled today for above-mentioned procedure by her bariatric surgeon, Dr. Pedro Larose.  Postoperatively transferred to PACU for further monitoring.    PAST MEDICAL HISTORY:  Morbid obesity, obstructive sleep apnea, hypertension, migraines, preeclampsia, and gestational diabetes.    PAST SURGICAL HISTORY:  Three C-sections.    MEDICATIONS:  Please see medication reconciliation list.     ALLERGIES:  Topical povidone, otherwise no known drug allergies.    SOCIAL HISTORY:  No tobacco, alcohol, or drug use.  Lives with her family.  Independent for basic activities of daily living.     FAMILY HISTORY:  Positive for hypertension.    REVIEW OF SYSTEMS:  Currently resting in bed.  Abdominal discomfort and bloating.  No chest pain, no shortness of breath.  Other 12-point review of systems is negative.        PHYSICAL EXAMINATION:    GENERAL:  Alert and oriented to time, place, and person.  Mild to moderate distress.    VITAL SIGNS:  Temperature 99.0, pulse 69, respiratory rate 13, blood pressure 106/67, pulse ox 99% on 2L nasal cannula oxygen.  HEENT:  Atraumatic.  Oropharynx clear.  Dry mucous membranes.  Normal hard and soft palate.  Eyes:  Anicteric sclerae.  NECK:  Supple.  No lymphadenopathy.  Trachea midline.  Full range of motion.  LUNGS:  Clear to auscultation bilaterally.  Normal respiratory effort.    HEART:  Regular  rate and rhythm.  S1 and S2 auscultated.  No murmur.  ABDOMEN:  Soft.  Laparoscopic incisions.  Hypoactive bowel sounds.  No tenderness.  EXTREMITIES:  No peripheral edema, clubbing, or cyanosis.  NEUROLOGIC:  Motor and sensory intact.      ASSESSMENT AND PLAN:    1.   Morbid obesity, status post laparoscopic sleeve gastrectomy.  Clear liquid diet.  Pain control.  Monitor hemodynamic status.  DVT prophylaxis.  2.   Obstructive sleep apnea.  Apply obstructive sleep apnea protocol and monitor.     Dictated By Veronica Ha MD  d: 08/19/2024 12:37:22  t: 08/19/2024 13:24:14  [facility] [id number]  FB/

## 2024-08-19 NOTE — OPERATIVE REPORT
[facility] / [facility]  Ashok Andersen / Bela / Ana Lilia / Chani / Thuan  Office - [phone]  Answering Service [phone]        Date: 8/19/2024  Preop Diagnosis: Morbid Obesity secondary to excess calories   Postop Diagnosis: Morbid Obesity secondary to excess calories  Procedure: Laparoscopic sleeve gastrectomy  Surgeon: Thuan  Assistant: Bela  Anesthesia: GETA  EBL: 1 ml  Complications: None    Indications: Pt is a 34 year old female who presents for elective bariatric surgery.  she has been counseled regarding the treatment of severe obesity, the options of behavioral, medical, and surgical treatment plans, and the potential pros and cons of each. she understands the multidisciplinary approach to the treatment of obesity, and the need for long-term followup.   Through a lengthy preoperative counseling process, she has elected for the sleeve gastrectomy as her procedure of choice.  she understands the different options for bariatric surgery, the laparoscopic approach, the possibility conversion to open, the anticipated pain and recovery time, the anticipated weight loss, the pros and cons of each operation, and the risks of bariatric surgery associated, including but not limited to universal risks such as bleeding, infection, DVT, PE, cardiac and pulmonary complications, and the small risk of suffering a fatal complication/death, as well as risks specific to the staple line leak, and understands the risk of postoperative reflux.  I discussed the possibility of encountering a hiatal hernia during the procedure in which case we may fix it in addition to the sleeve gastrectomy.  she also understands the potential long term sequelae and complications such as marginal ulcer, stricture, postoperative reflux.  she also understands the need for lifelong nutritional supplementation, and long-term follow-up, and agrees to proceed.    Operative Summary: Patient was brought to the  operating room and placed under general anesthesia.  Preoperative antibiotics and heparin were given and was secured to the operating room table.  Prepped and draped in a sterile fashion.  An Optiview technique was used to insert an 11 mm left paramedian trocar and the abdomen was insufflated and the patient tolerated the insufflation well throughout the entire case.  Cursory examination of the abdomen was unremarkable on insertion of the laparoscope.  The patient was then placed in steep reverse Trendelenburg position.  A 5 mm left lateral trocar, a 5 mm right upper quadrant trocar, and a 15 mm right paramedian trocar were all inserted under direct vision.  A Max liver retractor was inserted through an epigastric port.  0.25% Marcaine was injected for local anesthesia during trocar placement as well as a bilateral TAP block.      We began by mobilizing the greater curvature of the stomach using an Enseal device.  We took this dissection all the way up along the greater curve and mobilize the entire fundus.  The angle of His was carefully dissected out identifying the anterior border of the left fernando.  The posterior fundus was carefully mobilized off of the retroperitoneum.  There was no hiatal hernia noted.  Once the fundus was completely mobilized we continued our dissection distally along the greater curvature towards the antrum.  We stopped the dissection at the starting point of our sleeve gastrectomy, which was about 6 cm proximal to the pylorus.    We then advanced a 40 Japanese Visi G bougie that was inserted by anesthesia and guided along the lesser curve towards the antrum.  We then performed our sleeve gastrectomy starting with sequential fires of the [place] stapler with Seamguard reinforcements.  Along our sleeve, we ensured that we did not narrow the sleeve at the incisura and that we did not cause any twisting or corkscrewing of the sleeve along its path.  We completed our sleeve with a total of 2  black loads and 2 purple loads, taking care to ensure that we did not leave a significant posterior fundic pouch.  The final seam guard was cut sharply, completing the sleeve gastrectomy.    We ensured that there was excellent hemostasis along all dissection lines.  The Visi G was pulled back a few centimeters and the antrum was occluded with atraumatic grasper.  A leak test was performed by insufflating the VISI G under irrigated water.  There was good distention of the sleeve.  There is no evidence of any leak.  There is no evidence of any bleeding along the staple lines.  The irrigation was suctioned out.  The Max liver retractor was removed under direct vision.  The patient was flattened out.  The sleeve gastrectomy was extracted from the right paramedian trocar site.  The fascia at the extraction site was closed with 0 PDS suture passed with a Vijay Pb.  Trocars were removed under direct vision. The skin of all incisions was closed with 4-0 Vicryl and Dermabond.  Patient tolerated the procedure well and was brought to the postanesthesia recovery unit in stable condition.

## 2024-08-19 NOTE — RESPIRATORY THERAPY NOTE
CPAP/BIPAP Evaluation Done.  Pt agrees to previously been diagnosed with DANIA.  Pt denies using any CPAP machine at home.  Pt refused to use CPAP machine during his hospital stay.  Advise Pt to let the nurse know if he changes his mind.

## 2024-08-19 NOTE — ANESTHESIA PREPROCEDURE EVALUATION
Anesthesia PreOp Note    HPI:     Dilcia Garcia is a 34 year old female who presents for preoperative consultation requested by: Pedro Larose MD    Date of Surgery: 2024    Procedure(s):  Laparoscopic, possible open, gastric sleeve  Indication: Obesity, BMI, HTN, DANIA, osteoarthritis left knee    Relevant Problems   No relevant active problems       NPO:  Last Liquid Consumption Date: 24  Last Liquid Consumption Time: 630  Last Solid Consumption Date: 24  Last Solid Consumption Time:   Last Liquid Consumption Date: 24          History Review:  Patient Active Problem List    Diagnosis Date Noted    Sprain of right knee 2024    Peritonsillar abscess 2024    Lumbar back pain 03/10/2023    'Light-for-dates' infant with signs of fetal malnutrition (HCC) 2022    Vitamin D deficiency 2022    Lumbar sprain 2021    Tenosynovitis, de Quervain 2021    Severe pre-eclampsia, with delivery ([facility])     Increased appetite 2020    Essential hypertension 10/10/2018    Periodic headache syndrome, not intractable 2018    Encounter for therapeutic drug monitoring 2017    DANIA on CPAP 10/26/2017    Morbid obesity with BMI of 40.0-44.9, adult ([facility]) 2014       Past Medical History:    Abnormal Pap smear of cervix    HPV+. Colpo-2013.    Back problem    Chlamydia    COVID-19 affecting pregnancy in second trimester ([facility])    Positive test 2020. Did not take Lovenox.     Decorative tattoo    Genital herpes simplex    Gestational diabetes mellitus (GDM) requiring insulin (HCC)    Insulin    Gonorrhea    treated    Heart murmur    Had a heart murmur when she was born until 10 years.  Does not take any medicine.    History of chicken pox    History of  delivery    26-28 wk. Delivered for severe preeclampsia, IUGR.     History of prior pregnancy with IUGR     Hx of motion sickness    Hypertension    Had pre clampsia during pregnancy and pt  was induced to have her baby.     Low back pain during pregnancy in first trimester ([facility])    Hospitalized with severe low back pain in 1st trimester of pregnancy. Could not walk without morphine. Physical therapy.     Migraine    Morbid obesity with BMI of 45.0-49.9, adult ([facility])    Pre-preg BMI 47.5     Obesity    DANIA on CPAP    Periodic headache syndrome, not intractable    Preeclampsia, third trimester ([facility])    BP elevated at 28+ wk. Elevated urine protein 814 mg at 29w0d (3/19/2021). (3/18/2021) NEGATIVE lupus anticoagulant, beta 2 glycoprotein Ab, anticardiolipin.      Primary osteoarthritis of left knee    Severe preeclampsia ([facility])    Delivered at 28 wk at [facility]       Past Surgical History:   Procedure Laterality Date           at 28 wk for severe pre-eclampsia - [place]       2014      2021    Repeat LTCS at 32w0d - oligohydramnios, decreased fetal movement, preeclampsia with severe features Dr. Emi Benson, [facility].     Colposcopy, cervix w/upper adjacent vagina; w/biopsy(s), cervix  2013       Medications Prior to Admission   Medication Sig Dispense Refill Last Dose    [] aprepitant (EMEND) 40 MG Oral Cap Take 2 capsules (80 mg total) by mouth one time for 1 dose. 3-4 hours before surgery 2 capsule 0     hydroCHLOROthiazide 12.5 MG Oral Tab Take 1 tablet (12.5 mg total) by mouth daily. 90 tablet 3 Past Month    cyclobenzaprine 10 MG Oral Tab Take 1 tablet (10 mg total) by mouth 3 (three) times daily as needed for Muscle spasms.       [] ergocalciferol 1.25 MG (42746 UT) Oral Cap Take 1 capsule (50,000 Units total) by mouth once a week. 4 capsule 2      Current Facility-Administered Medications Ordered in Epic   Medication Dose Route Frequency Provider Last Rate Last Admin    lactated ringers infusion   Intravenous Continuous Pedro Larose MD 20 mL/hr at 24 0910 New Bag at 24 0910    ceFAZolin (Ancef) 2g  in 10mL IV syringe premix  2 g Intravenous Once Pedro Larose MD         No current Epic-ordered outpatient medications on file.       Allergies   Allergen Reactions    Povidone Iodine RASH       History reviewed. No pertinent family history.  Social History     Socioeconomic History    Marital status: Legally    Tobacco Use    Smoking status: Never    Smokeless tobacco: Never   Vaping Use    Vaping status: Never Used   Substance and Sexual Activity    Alcohol use: Not Currently     Alcohol/week: 0.0 standard drinks of alcohol     Comment: SOCIALLY    Drug use: No    Sexual activity: Not Currently     Partners: Male   Other Topics Concern    Caffeine Concern No    Exercise Yes    Special Diet No    Weight Concern Yes    Pt has a pacemaker No    Pt has a defibrillator No    Reaction to local anesthetic No       Available pre-op labs reviewed.  Lab Results   Component Value Date    WBC 4.2 08/16/2024    RBC 4.55 08/16/2024    HGB 13.0 08/16/2024    HCT 38.8 08/16/2024    MCV 85.3 08/16/2024    MCH 28.6 08/16/2024    MCHC 33.5 08/16/2024    RDW 13.8 08/16/2024    .0 08/16/2024    URINEPREG Negative 08/19/2024     Lab Results   Component Value Date     08/16/2024    K 4.1 08/16/2024     08/16/2024    CO2 24.0 08/16/2024    BUN 14 08/16/2024    CREATSERUM 0.96 08/16/2024    GLU 79 08/16/2024    CA 9.7 08/16/2024          Vital Signs:  Body mass index is 44.97 kg/m².   height is 1.626 m (5' 4\") and weight is 118.8 kg (262 lb). Her oral temperature is 98.3 °F (36.8 °C). Her blood pressure is 117/72 and her pulse is 80. Her respiration is 16 and oxygen saturation is 98%.   Vitals:    08/15/24 1431 08/19/24 0838   BP:  117/72   Pulse:  80   Resp:  16   Temp:  98.3 °F (36.8 °C)   TempSrc:  Oral   SpO2:  98%   Weight: 118.8 kg (262 lb)    Height: 1.626 m (5' 4\")         Anesthesia Evaluation     Patient summary reviewed    Airway   Mallampati: II  TM distance: >3 FB  Neck ROM: full  Dental -  Dentition appears grossly intact     Pulmonary - normal exam   (+) sleep apnea  (-) asthma  Cardiovascular - normal exam  (-) hypertension    ECG reviewed    Neuro/Psych    (+)  headaches,        GI/Hepatic/Renal    (-) GERD    Endo/Other    (+) diabetes mellitus (GDM)  Abdominal   (+) obese                 Anesthesia Plan:   ASA:  3  Plan:   General  Airway:  ETT  Post-op Pain Management: IV analgesics and Oral pain medication  Informed Consent Plan and Risks Discussed With:  Patient  Discussed plan with:  CRNA      I have informed Dilcia DANICA Garcia and/or legal guardian or family member of the nature of the anesthetic plan, benefits, risks including possible dental damage if relevant, major complications, and any alternative forms of anesthetic management.   All of the patient's questions were answered to the best of my ability. The patient desires the anesthetic management as planned.  OMA WARNER MD  8/19/2024 10:19 AM  Present on Admission:  **None**

## 2024-08-19 NOTE — ANESTHESIA PROCEDURE NOTES
Airway  Date/Time: 8/19/2024 10:49 AM  Urgency: Elective      General Information and Staff    Patient location during procedure: OR  Anesthesiologist: Dannielle Strong MD  Resident/CRNA: Mariaelena Burns CRNA  Performed: CRNA   Performed by: Mariaelena Burns CRNA  Authorized by: Dannielle Strong MD      Indications and Patient Condition  Indications for airway management: anesthesia  Sedation level: deep  Preoxygenated: yes  Patient position: sniffing  Mask difficulty assessment: 1 - vent by mask    Final Airway Details  Final airway type: endotracheal airway      Successful airway: ETT  Cuffed: yes   Successful intubation technique: Video laryngoscopy  Endotracheal tube insertion site: oral  Blade: GlideScope  Blade size: #3  ETT size (mm): 7.5    Cormack-Lehane Classification: grade I - full view of glottis  Placement verified by: capnometry   Measured from: lips  ETT to lips (cm): 21  Number of attempts at approach: 1    Additional Comments  Dental exam unchanged from pre op

## 2024-08-19 NOTE — PLAN OF CARE
Problem: PAIN - ADULT  Goal: Verbalizes/displays adequate comfort level or patient's stated pain goal  Description: INTERVENTIONS:  - Encourage pt to monitor pain and request assistance  - Assess pain using appropriate pain scale  - Administer analgesics based on type and severity of pain and evaluate response  - Implement non-pharmacological measures as appropriate and evaluate response  - Consider cultural and social influences on pain and pain management  - Manage/alleviate anxiety  - Utilize distraction and/or relaxation techniques  - Monitor for opioid side effects  - Notify MD/LIP if interventions unsuccessful or patient reports new pain  - Anticipate increased pain with activity and pre-medicate as appropriate  Outcome: Progressing     Problem: SAFETY ADULT - FALL  Goal: Free from fall injury  Description: INTERVENTIONS:  - Assess pt frequently for physical needs  - Identify cognitive and physical deficits and behaviors that affect risk of falls.  - [place] fall precautions as indicated by assessment.  - Educate pt/family on patient safety including physical limitations  - Instruct pt to call for assistance with activity based on assessment  - Modify environment to reduce risk of injury  - Provide assistive devices as appropriate  - Consider OT/PT consult to assist with strengthening/mobility  - Encourage toileting schedule  Outcome: Progressing     Problem: DISCHARGE PLANNING  Goal: Discharge to home or other facility with appropriate resources  Description: INTERVENTIONS:  - Identify barriers to discharge w/pt and caregiver  - Include patient/family/discharge partner in discharge planning  - Arrange for needed discharge resources and transportation as appropriate  - Identify discharge learning needs (meds, wound care, etc)  - Arrange for interpreters to assist at discharge as needed  - Consider post-discharge preferences of patient/family/discharge partner  - Complete POLST form as appropriate  - Assess  patient's ability to be responsible for managing their own health  - Refer to Case Management Department for coordinating discharge planning if the patient needs post-hospital services based on physician/LIP order or complex needs related to functional status, cognitive ability or social support system  Outcome: Progressing     Problem: GASTROINTESTINAL - ADULT  Goal: Achieves appropriate nutritional intake (bariatric)  Description: INTERVENTIONS:  - Monitor for over-consumption  - Identify factors contributing to increased intake, treat as appropriate  - Monitor I&O, WT and lab values  - Obtain nutritional consult as needed  - Evaluate psychosocial factors contributing to over-consumption  Outcome: Progressing     Problem: SKIN/TISSUE INTEGRITY - ADULT  Goal: Skin integrity remains intact  Description: INTERVENTIONS  - Assess and document risk factors for pressure ulcer development  - Assess and document skin integrity  - Monitor for areas of redness and/or skin breakdown  - Initiate interventions, skin care algorithm/standards of care as needed  Outcome: Progressing  Goal: Incision(s), wounds(s) or drain site(s) healing without S/S of infection  Description: INTERVENTIONS:  - Assess and document risk factors for pressure ulcer development  - Assess and document skin integrity  - Assess and document dressing/incision, wound bed, drain sites and surrounding tissue  - Implement wound care per orders  - Initiate isolation precautions as appropriate  - Initiate Pressure Ulcer prevention bundle as indicated  Outcome: Progressing   Pt alert but sleepy, up and ambulated in the gunderson with assist and sat in chair, now she is back to bed resting now. No nausea reported at this time. Getting fluids. Getting liquid tylenol and Toradol scheduled.call light with in reach and she knows to call for assistance.

## 2024-08-19 NOTE — H&P
[facility] / [facility]  Ashok Andersen / Bela / Ana Lilia / Chani / Thuan  Office - [phone]  Answering Service [phone]      Dilcia Garcia Patient Status:  Outpatient in a Bed    1990 MRN [id number]   Location [facility] PRE OP RECOVERY Attending Pedro Larose MD   Hosp Day # 0 PCP Grace Montenegro MD     Subjective:  The patient is a 34 year old obese female admitted for laparoscopic gastric sleeve surgery.No issues with liquid diet. Took emend.  Body mass index is 44.97 kg/m².         Patient Active Problem List    Diagnosis Date Noted    Sprain of right knee 2024    Peritonsillar abscess 2024    Lumbar back pain 03/10/2023    'Light-for-dates' infant with signs of fetal malnutrition (HCC) 2022    Vitamin D deficiency 2022    Lumbar sprain 2021    Tenosynovitis, de Quervain 2021    Severe pre-eclampsia, with delivery ([facility])     Increased appetite 2020    Essential hypertension 10/10/2018    Periodic headache syndrome, not intractable 2018    Encounter for therapeutic drug monitoring 2017    DANIA on CPAP 10/26/2017    Morbid obesity with BMI of 40.0-44.9, adult (HCC) 2014     Past Medical History:    Abnormal Pap smear of cervix    HPV+. [place]-2013.    Back problem    Chlamydia    COVID-19 affecting pregnancy in second trimester ([facility])    Positive test 2020. Did not take Lovenox.     Decorative tattoo    Genital herpes simplex    Gestational diabetes mellitus (GDM) requiring insulin (HCC)    Insulin    Gonorrhea    treated    Heart murmur    Had a heart murmur when she was born until 10 years.  Does not take any medicine.    History of chicken pox    History of  delivery    26-28 wk. Delivered for severe preeclampsia, IUGR.     History of prior pregnancy with IUGR     Hx of motion sickness    Hypertension    Had pre clampsia during pregnancy and pt was induced to have her  baby.     Low back pain during pregnancy in first trimester ([facility])    Hospitalized with severe low back pain in 1st trimester of pregnancy. Could not walk without morphine. Physical therapy.     Migraine    Morbid obesity with BMI of 45.0-49.9, adult ([facility])    Pre-preg BMI 47.5     Obesity    DANIA on CPAP    Periodic headache syndrome, not intractable    Preeclampsia, third trimester ([facility])    BP elevated at 28+ wk. Elevated urine protein 814 mg at 29w0d (3/19/2021). (3/18/2021) NEGATIVE lupus anticoagulant, beta 2 glycoprotein Ab, anticardiolipin.      Primary osteoarthritis of left knee    Severe preeclampsia (HCC)    Delivered at 28 wk at [facility]      Past Surgical History:   Procedure Laterality Date           at 28 wk for severe pre-eclampsia - [place]       2014      2021    Repeat LTCS at 32w0d - oligohydramnios, decreased fetal movement, preeclampsia with severe features Dr. Emi Benson, [facility].     Colposcopy, cervix w/upper adjacent vagina; w/biopsy(s), cervix  2013      Medications Prior to Admission   Medication Sig Dispense Refill Last Dose    [] aprepitant (EMEND) 40 MG Oral Cap Take 2 capsules (80 mg total) by mouth one time for 1 dose. 3-4 hours before surgery 2 capsule 0     hydroCHLOROthiazide 12.5 MG Oral Tab Take 1 tablet (12.5 mg total) by mouth daily. 90 tablet 3 Past Month    cyclobenzaprine 10 MG Oral Tab Take 1 tablet (10 mg total) by mouth 3 (three) times daily as needed for Muscle spasms.       [] ergocalciferol 1.25 MG (43246 UT) Oral Cap Take 1 capsule (50,000 Units total) by mouth once a week. 4 capsule 2      Allergies   Allergen Reactions    Povidone Iodine RASH      Social History     Tobacco Use    Smoking status: Never    Smokeless tobacco: Never   Substance Use Topics    Alcohol use: Not Currently     Alcohol/week: 0.0 standard drinks of alcohol     Comment: SOCIALLY      History reviewed. No  pertinent family history.   Review of Systems      Constitutional: negative  HEENT: negative  Respiratory: negative  Cardiovascular: negative  Gastrointestinal: negative  Genitourinary: negative  Musculoskeletal: negative  Neurological: negative  Psychological: negative  Endocrine: negative  Immunologic: negative  Integumentary: negative      Objective:  Vital signs in last 24 hours:  Temp:  [98.3 °F (36.8 °C)] 98.3 °F (36.8 °C)  Pulse:  [80] 80  Resp:  [16] 16  BP: (117)/(72) 117/72  SpO2:  [98 %] 98 %    General: no acute distress, well-nourished  HENT: normocephalic, atraumatic  Lung: breathing comfortably, symmetrical expansion, clear to ausculation bilaterally, no wheezing  Heart: regular rate and rhythm, no murmur detected  Abdomen: soft, obese, non-tender, non-distended  Extremities: normal strength, normal ROM  Neuro: no focal deficits, cranial nerves grossly intact  Psych: alert and oriented, normal affect  Skin: warm, dry    Data Review: CBC:   Lab Results   Component Value Date    WBC 4.2 08/16/2024    RBC 4.55 08/16/2024    HEMOGLOBIN 13.4 05/23/2024    HEMATOCRIT 39.7 05/23/2024     BMP:   Lab Results   Component Value Date    CO2 24.0 08/16/2024    BUN 14 08/16/2024       Assessment/Plan:  Morbid obesity with failure of conservative therapy. Pt has opted for sleeve gastrectomy.    The patient was informed that risks include, but are not limited to: death, leak, obstruction, bleeding, and sepsis. Any of these could require further surgery. Other risks include DVT, PE, pneumonia, wound dehiscence, hernia, wound infection, the need for dilatations, and the inability to lose appropriate weight and keep it off.     We discussed that our goal is to ameliorate her medical problems and not to obtain a specific body mass index. She understands the risks and benefits and wishes to proceed with the procedure. She has signed a consent form.     Date of Surgery Update (To be completed by Attending Surgeon day of  surgery.)   There have been no significant clinical changes since the completion of the above H&P.

## 2024-08-19 NOTE — PROGRESS NOTES
Provided/reviewed bariatric post-op discharge instructions; stressed importance of fluids aim for 64 ozs/day, pt drank ~ 11 ozs so far; caring for incisions, activities/allowed/avoid; meds to take/avoid; importance of bariatric vits; pt planning to take Bariatric Choice; managing constipation; post-op fu; pt has appt next week w/ surgeon; signs and symptoms of concern; contact info; pt agreed and verbalized understanding.

## 2024-08-20 NOTE — PLAN OF CARE
Problem: PAIN - ADULT  Goal: Verbalizes/displays adequate comfort level or patient's stated pain goal  Description: INTERVENTIONS:  - Encourage pt to monitor pain and request assistance  - Assess pain using appropriate pain scale  - Administer analgesics based on type and severity of pain and evaluate response  - Implement non-pharmacological measures as appropriate and evaluate response  - Consider cultural and social influences on pain and pain management  - Manage/alleviate anxiety  - Utilize distraction and/or relaxation techniques  - Monitor for opioid side effects  - Notify MD/LIP if interventions unsuccessful or patient reports new pain  - Anticipate increased pain with activity and pre-medicate as appropriate  Outcome: Progressing     Problem: SAFETY ADULT - FALL  Goal: Free from fall injury  Description: INTERVENTIONS:  - Assess pt frequently for physical needs  - Identify cognitive and physical deficits and behaviors that affect risk of falls.  - [place] fall precautions as indicated by assessment.  - Educate pt/family on patient safety including physical limitations  - Instruct pt to call for assistance with activity based on assessment  - Modify environment to reduce risk of injury  - Provide assistive devices as appropriate  - Consider OT/PT consult to assist with strengthening/mobility  - Encourage toileting schedule  Outcome: Progressing     Problem: DISCHARGE PLANNING  Goal: Discharge to home or other facility with appropriate resources  Description: INTERVENTIONS:  - Identify barriers to discharge w/pt and caregiver  - Include patient/family/discharge partner in discharge planning  - Arrange for needed discharge resources and transportation as appropriate  - Identify discharge learning needs (meds, wound care, etc)  - Arrange for interpreters to assist at discharge as needed  - Consider post-discharge preferences of patient/family/discharge partner  - Complete POLST form as appropriate  - Assess  patient's ability to be responsible for managing their own health  - Refer to Case Management Department for coordinating discharge planning if the patient needs post-hospital services based on physician/LIP order or complex needs related to functional status, cognitive ability or social support system  Outcome: Progressing     Problem: GASTROINTESTINAL - ADULT  Goal: Achieves appropriate nutritional intake (bariatric)  Description: INTERVENTIONS:  - Monitor for over-consumption  - Identify factors contributing to increased intake, treat as appropriate  - Monitor I&O, WT and lab values  - Obtain nutritional consult as needed  - Evaluate psychosocial factors contributing to over-consumption  Outcome: Progressing     Problem: SKIN/TISSUE INTEGRITY - ADULT  Goal: Skin integrity remains intact  Description: INTERVENTIONS  - Assess and document risk factors for pressure ulcer development  - Assess and document skin integrity  - Monitor for areas of redness and/or skin breakdown  - Initiate interventions, skin care algorithm/standards of care as needed  Outcome: Progressing  Goal: Incision(s), wounds(s) or drain site(s) healing without S/S of infection  Description: INTERVENTIONS:  - Assess and document risk factors for pressure ulcer development  - Assess and document skin integrity  - Assess and document dressing/incision, wound bed, drain sites and surrounding tissue  - Implement wound care per orders  - Initiate isolation precautions as appropriate  - Initiate Pressure Ulcer prevention bundle as indicated  Outcome: Progressing   Pt alert and oriented, drinking po fluids and encourage her to drink, ivf continuing at 150 ml/hr. Voiding freely. Pt is not able to pass any gas able to do little burping per pt. Pt is ambulating in the gunderson, scheduled liquid tylenol and Toradol and oxy ir for break through pain. Plan is to go home tomorrow if she is medically clear to go.

## 2024-08-20 NOTE — PLAN OF CARE
Patient has safety precautions in place bed in the lowest position, bed alarm on, and call light within reach. Plan of care ongoing. No further concerns as of present.  Iv fluids running (see MAR).  Problem: Patient Centered Care  Goal: Patient preferences are identified and integrated in the patient's plan of care  Description: Interventions:    - Provide timely, complete, and accurate information to patient/family  - Incorporate patient and family knowledge, values, beliefs, and cultural backgrounds into the planning and delivery of care  - Encourage patient/family to participate in care and decision-making at the level they choose  - Honor patient and family perspectives and choices  Outcome: Progressing       Problem: PAIN - ADULT  Goal: Verbalizes/displays adequate comfort level or patient's stated pain goal  Description: INTERVENTIONS:  - Encourage pt to monitor pain and request assistance  - Assess pain using appropriate pain scale  - Administer analgesics based on type and severity of pain and evaluate response  - Implement non-pharmacological measures as appropriate and evaluate response  - Consider cultural and social influences on pain and pain management  - Manage/alleviate anxiety  - Utilize distraction and/or relaxation techniques  - Monitor for opioid side effects  - Notify MD/LIP if interventions unsuccessful or patient reports new pain  - Anticipate increased pain with activity and pre-medicate as appropriate  Outcome: Progressing     Problem: SAFETY ADULT - FALL  Goal: Free from fall injury  Description: INTERVENTIONS:  - Assess pt frequently for physical needs  - Identify cognitive and physical deficits and behaviors that affect risk of falls.  - [place] fall precautions as indicated by assessment.  - Educate pt/family on patient safety including physical limitations  - Instruct pt to call for assistance with activity based on assessment  - Modify environment to reduce risk of injury  - Provide  assistive devices as appropriate  - Consider OT/PT consult to assist with strengthening/mobility  - Encourage toileting schedule  Outcome: Progressing     Problem: DISCHARGE PLANNING  Goal: Discharge to home or other facility with appropriate resources  Description: INTERVENTIONS:  - Identify barriers to discharge w/pt and caregiver  - Include patient/family/discharge partner in discharge planning  - Arrange for needed discharge resources and transportation as appropriate  - Identify discharge learning needs (meds, wound care, etc)  - Arrange for interpreters to assist at discharge as needed  - Consider post-discharge preferences of patient/family/discharge partner  - Complete POLST form as appropriate  - Assess patient's ability to be responsible for managing their own health  - Refer to Case Management Department for coordinating discharge planning if the patient needs post-hospital services based on physician/LIP order or complex needs related to functional status, cognitive ability or social support system  Outcome: Progressing     Problem: GASTROINTESTINAL - ADULT  Goal: Achieves appropriate nutritional intake (bariatric)  Description: INTERVENTIONS:  - Monitor for over-consumption  - Identify factors contributing to increased intake, treat as appropriate  - Monitor I&O, WT and lab values  - Obtain nutritional consult as needed  - Evaluate psychosocial factors contributing to over-consumption  Outcome: Progressing     Problem: SKIN/TISSUE INTEGRITY - ADULT  Goal: Skin integrity remains intact  Description: INTERVENTIONS  - Assess and document risk factors for pressure ulcer development  - Assess and document skin integrity  - Monitor for areas of redness and/or skin breakdown  - Initiate interventions, skin care algorithm/standards of care as needed  Outcome: Progressing  Goal: Incision(s), wounds(s) or drain site(s) healing without S/S of infection  Description: INTERVENTIONS:  - Assess and document risk  factors for pressure ulcer development  - Assess and document skin integrity  - Assess and document dressing/incision, wound bed, drain sites and surrounding tissue  - Implement wound care per orders  - Initiate isolation precautions as appropriate  - Initiate Pressure Ulcer prevention bundle as indicated  Outcome: Progressing

## 2024-08-20 NOTE — PAYOR COMM NOTE
--------------  ADMISSION REVIEW     Payor: [facility]  Subscriber #:  [id number]  Authorization Number: [id number]    Admit date: 24  Admit time:  8:27 AM      SURGERY:    The patient is a 34 year old obese female admitted for laparoscopic gastric sleeve surgery.No issues with liquid diet. Took emend.  Body mass index is 44.97 kg/m².     Past Medical History:    Abnormal Pap smear of cervix     HPV+. Colpo-2013.    Back problem    Chlamydia    COVID-19 affecting pregnancy in second trimester ([facility])     Positive test 2020. Did not take Lovenox.     Decorative tattoo    Genital herpes simplex    Gestational diabetes mellitus (GDM) requiring insulin ([facility])     Insulin    Gonorrhea     treated    Heart murmur     Had a heart murmur when she was born until 10 years.  Does not take any medicine.    History of chicken pox    History of  delivery     26-28 wk. Delivered for severe preeclampsia, IUGR.     History of prior pregnancy with IUGR     Hx of motion sickness    Hypertension     Had pre clampsia during pregnancy and pt was induced to have her baby.     Low back pain during pregnancy in first trimester ([facility])     Hospitalized with severe low back pain in 1st trimester of pregnancy. Could not walk without morphine. Physical therapy.     Migraine    Morbid obesity with BMI of 45.0-49.9, adult ([facility])     Pre-preg BMI 47.5     Obesity    DANIA on CPAP    Periodic headache syndrome, not intractable    Preeclampsia, third trimester ([facility])     BP elevated at 28+ wk. Elevated urine protein 814 mg at 29w0d (3/19/2021). (3/18/2021) NEGATIVE lupus anticoagulant, beta 2 glycoprotein Ab, anticardiolipin.      Primary osteoarthritis of left knee    Severe preeclampsia ([facility])     Delivered at 28 wk at [place]     Sleep apnea       Assessment/Plan:  Morbid obesity with failure of conservative therapy. Pt has opted for sleeve gastrectomy.    Anesthesia Plan:   ASA:  3  Plan:   General       Date: 2024  Preop  Diagnosis: Morbid Obesity secondary to excess calories   Postop Diagnosis: Morbid Obesity secondary to excess calories  Procedure: Laparoscopic sleeve gastrectomy      HOSPITALIST:      REASON FOR ADMISSION:  Post laparoscopic sleeve gastrectomy.     HISTORY OF PRESENT ILLNESS:  The patient is a 34-year-old  female with chronic morbid obesity.  Failed outpatient conservative medical management options.  Scheduled today for above-mentioned procedure by her bariatric surgeon, Dr. Pedro Larose.  Postoperatively transferred to PACU for further monitoring.     PAST MEDICAL HISTORY:  Morbid obesity, obstructive sleep apnea, hypertension, migraines, preeclampsia, and gestational diabetes.     PAST SURGICAL HISTORY:  Three C-sections.     MEDICATIONS:  Please see medication reconciliation list.      ALLERGIES:  Topical povidone, otherwise no known drug allergies.     SOCIAL HISTORY:  No tobacco, alcohol, or drug use.  Lives with her family.  Independent for basic activities of daily living.      FAMILY HISTORY:  Positive for hypertension.     REVIEW OF SYSTEMS:  Currently resting in bed.  Abdominal discomfort and bloating.  No chest pain, no shortness of breath.  Other 12-point review of systems is negative.         PHYSICAL EXAMINATION:    GENERAL:  Alert and oriented to time, place, and person.  Mild to moderate distress.    VITAL SIGNS:  Temperature 99.0, pulse 69, respiratory rate 13, blood pressure 106/67, pulse ox 99% on 2L nasal cannula oxygen.  HEENT:  Atraumatic.  Oropharynx clear.  Dry mucous membranes.  Normal hard and soft palate.  Eyes:  Anicteric sclerae.  NECK:  Supple.  No lymphadenopathy.  Trachea midline.  Full range of motion.  LUNGS:  Clear to auscultation bilaterally.  Normal respiratory effort.    HEART:  Regular rate and rhythm.  S1 and S2 auscultated.  No murmur.  ABDOMEN:  Soft.  Laparoscopic incisions.  Hypoactive bowel sounds.  No tenderness.  EXTREMITIES:  No peripheral edema,  clubbing, or cyanosis.  NEUROLOGIC:  Motor and sensory intact.       ASSESSMENT AND PLAN:    1.       Morbid obesity, status post laparoscopic sleeve gastrectomy.  Clear liquid diet.  Pain control.  Monitor hemodynamic status.  DVT prophylaxis.  2.       Obstructive sleep apnea.  Apply obstructive sleep apnea protocol and monitor.        Medications     IV Fluids: 150 ml/hr      8/20:     SURGERY:    not drinking much, not recorded.       Objective/Physical Exam:  Temp:  [97.4 °F (36.3 °C)-99.7 °F (37.6 °C)] 98 °F (36.7 °C)  Pulse:  [54-84] 68  Resp:  [10-16] 16  BP: (100-139)/(47-90) 137/90  SpO2:  [97 %-100 %] 98 %       General:    nad  Lungs:  unlabored  CV:  RRR, HR 60  Abdomen:  Soft, wounds clean,. No hernias  Current Meds:  Current Hospital Medications[]Expand by Default          Current Facility-Administered Medications   Medication Dose Route Frequency    lactated ringers infusion   Intravenous Continuous    scopolamine (Transderm-Scop) 1 MG/3DAYS patch 1 patch  1 patch Transdermal Once    lactated ringers infusion   Intravenous Continuous    enoxaparin (Lovenox) 40 MG/0.4ML SUBQ injection 40 mg  40 mg Subcutaneous Daily    acetaminophen (Tylenol) 160 MG/5ML oral liquid 1,000 mg  1,000 mg Oral Q8H    ketorolac (Toradol) 30 MG/ML injection 30 mg  30 mg Intravenous Q6H    oxyCODONE immediate release tab 5 mg  5 mg Oral Q6H PRN    morphINE PF 2 MG/ML injection 1 mg  1 mg Intravenous Q2H PRN     Or    morphINE PF 2 MG/ML injection 2 mg  2 mg Intravenous Q2H PRN     Or    morphINE PF 4 MG/ML injection 4 mg  4 mg Intravenous Q2H PRN    ondansetron (Zofran) 4 MG/2ML injection 4 mg  4 mg Intravenous 4 times per day    pantoprazole (Protonix) DR tab 40 mg  40 mg Oral QAM AC            Labs:        Lab Results   Component Value Date     WBC 8.0 08/20/2024     HGB 12.1 08/20/2024     HCT 35.7 08/20/2024     .0 08/20/2024      08/20/2024     K 4.5 08/20/2024     CO2 27.0 08/20/2024     BUN 7 (L)  08/20/2024      (H) 08/20/2024       Assessment/Plan:  S/p sleeve, doing well.  Encouraged more po intake.  Prob home tomorrow, otherwise looks well      MEDICATIONS ADMINISTERED IN LAST 1 DAY:  acetaminophen (Tylenol) 160 MG/5ML oral liquid 1,000 mg       Date Action Dose Route User    8/19/2024 2128 Given (none) Oral Robina Johnson RN    8/19/2024 1412 Given 1,000 mg Oral Betsey Rdz RN          enoxaparin (Lovenox) 40 MG/0.4ML SUBQ injection 40 mg       Date Action Dose Route User    8/20/2024 0910 Given 40 mg Subcutaneous (Right Lower Abdomen) Betsey Rdz RN          ketorolac (Toradol) 30 MG/ML injection 30 mg       Date Action Dose Route User    8/20/2024 0909 Given 30 mg Intravenous Betsey Rdz RN    8/20/2024 0400 Given 30 mg Intravenous Robina Johnson RN    8/19/2024 1916 Given 30 mg Intravenous Robina Johnson RN    8/19/2024 1413 Given 30 mg Intravenous Betsey Rdz RN          lactated ringers infusion 150/hr      Date Action Dose Route User    8/20/2024 1223 New Bag (none) Intravenous Betsey Rdz RN    8/20/2024 0401 New Bag (none) Intravenous Robina Johnson RN    8/19/2024 1414 New Bag (none) Intravenous Betsey Rdz RN          ondansetron (Zofran) 4 MG/2ML injection 4 mg       Date Action Dose Route User    8/20/2024 1223 Given 4 mg Intravenous Betsey Rdz RN    8/20/2024 0509 Given 4 mg Intravenous Robina Johnson RN    8/19/2024 2344 Given 4 mg Intravenous Robina Johsnon RN    8/19/2024 1912 Given 4 mg Intravenous Robina Johnson RN    8/19/2024 1414 Given 4 mg Intravenous Betsey Rdz RN              Vitals (last day)       Date/Time Temp Pulse Resp BP SpO2 Weight O2 Device O2 Flow Rate (L/min) Who    08/20/24 1231 -- -- -- -- -- 262 lb (118.8 kg) -- -- CW    08/20/24 0840 -- -- -- 155/96 -- -- -- -- NT    08/20/24 0839 98.1 °F (36.7 °C) 58 16 154/103 100 % -- None (Room air) -- NT    08/20/24 0346 98 °F (36.7 °C) 68 16 137/90 98 % -- None (Room  air) -- AA    08/20/24 0016 97.9 °F (36.6 °C) 54 16 137/85 100 % -- None (Room air) -- AA    08/19/24 1921 97.8 °F (36.6 °C) 81 16 139/90 97 % -- None (Room air) -- IL    08/19/24 1602 97.5 °F (36.4 °C) -- 16 129/84 97 % -- None (Room air) -- LB    08/19/24 1400 -- 79 -- -- -- -- -- -- ST    08/19/24 1349 98.5 °F (36.9 °C) 77 14 104/77 100 % -- None (Room air) -- CW    08/19/24 1331 97.4 °F (36.3 °C) 72 13 112/77 100 % -- Nasal cannula 2 L/min DS    08/19/24 1321 -- 63 11 115/78 100 % -- Nasal cannula 2 L/min DS    08/19/24 1311 -- 70 11 114/79 100 % -- Nasal cannula 2 L/min DS    08/19/24 1301 -- 67 10 108/74 99 % -- Nasal cannula -- DS    08/19/24 1251 -- 64 11 111/70 100 % -- Nasal cannula 2 L/min DS    08/19/24 1241 -- 72 13 115/66 100 % -- Nasal cannula 2 L/min DS    08/19/24 1231 -- 69 13 106/67 99 % -- Nasal cannula 2 L/min DS    08/19/24 1221 -- 74 11 106/68 98 % -- Nasal cannula 2 L/min DS    08/19/24 1211 -- 77 14 105/68 97 % -- Nasal cannula 2 L/min DS    08/19/24 1201 -- 79 15 100/47 100 % -- Nasal cannula 2 L/min DS    08/19/24 1151 -- -- -- -- -- -- Nasal cannula 2 L/min DS    08/19/24 1151 99.7 °F (37.6 °C) 84 14 104/55 100 % -- -- -- SO    08/19/24 0838 98.3 °F (36.8 °C) 80 16 117/72 98 % -- None (Room air) -- EM

## 2024-08-20 NOTE — PROGRESS NOTES
[facility]  part of [facility]    Progress Note    Dilcia Garcia Patient Status:  Inpatient    1990 MRN [id number]   Location [facility] 4W/SW/SE Attending Lela Patel MD   Hosp Day # 1 PCP Grace Montenegro MD     Chief Complaint: morbid obesity      SUBJECTIVE:    No acute events overnight.  Patient denies chest pain, sob.  No fevers/chills.  No n/v.  Having expected post op pain.     10 ROS completed and otherwise negative.    OBJECTIVE:  Vital signs in last 24 hours:  BP (!) 155/96   Pulse 58   Temp 98.1 °F (36.7 °C) (Oral)   Resp 16   Ht 5' 4\" (1.626 m)   Wt 262 lb (118.8 kg)   LMP 2024 (Exact Date)   SpO2 100%   BMI 44.97 kg/m²     Intake/Output:    Intake/Output Summary (Last 24 hours) at 2024 1344  Last data filed at 2024 0401  Gross per 24 hour   Intake 2397.5 ml   Output 600 ml   Net 1797.5 ml       Wt Readings from Last 3 Encounters:   24 262 lb (118.8 kg)   24 262 lb (118.8 kg)   24 261 lb 9.6 oz (118.7 kg)       Exam     Gen: No acute distress  Pulm: Lungs clear, normal respiratory effort  CV: Heart with regular rate and rhythm  Abd: Abdomen soft  Neuro: No acute focal deficits  MSK: moves extremities  Skin: Warm and dry  Psych: Normal affect  Ext: no c/c/e    Data Review:     Labs:   Lab Results   Component Value Date    WBC 8.0 2024    HGB 12.1 2024    HCT 35.7 2024    .0 2024    CREATSERUM 0.87 2024    BUN 7 2024     2024    K 4.5 2024     2024    CO2 27.0 2024     2024    CA 9.5 2024       Imaging:   No results found.    Meds:   Current Facility-Administered Medications   Medication Dose Route Frequency    lactated ringers infusion   Intravenous Continuous    scopolamine (Transderm-Scop) 1 MG/3DAYS patch 1 patch  1 patch Transdermal Once    lactated ringers infusion   Intravenous Continuous    enoxaparin  (Lovenox) 40 MG/0.4ML SUBQ injection 40 mg  40 mg Subcutaneous Daily    acetaminophen (Tylenol) 160 MG/5ML oral liquid 1,000 mg  1,000 mg Oral Q8H    ketorolac (Toradol) 30 MG/ML injection 30 mg  30 mg Intravenous Q6H    oxyCODONE immediate release tab 5 mg  5 mg Oral Q6H PRN    morphINE PF 2 MG/ML injection 1 mg  1 mg Intravenous Q2H PRN    Or    morphINE PF 2 MG/ML injection 2 mg  2 mg Intravenous Q2H PRN    Or    morphINE PF 4 MG/ML injection 4 mg  4 mg Intravenous Q2H PRN    ondansetron (Zofran) 4 MG/2ML injection 4 mg  4 mg Intravenous 4 times per day    pantoprazole (Protonix) DR tab 40 mg  40 mg Oral QAM AC         Assessment  Patient Active Problem List   Diagnosis    Morbid obesity with BMI of 40.0-44.9, adult (HCC)    DANIA on CPAP    Encounter for therapeutic drug monitoring    Periodic headache syndrome, not intractable    Essential hypertension    Increased appetite    Severe pre-eclampsia, with delivery ([facility])    Lumbar sprain    Tenosynovitis, de Quervain    Vitamin D deficiency    'Light-for-dates' infant with signs of fetal malnutrition ([facility])    Lumbar back pain    Peritonsillar abscess    Sprain of right knee    Morbid (severe) obesity due to excess calories ([facility])       Plan:     Morbid obesity - BMI 45  - s/p lap sleeve gastrectomy  - cont bariatric diet protocol  - pain control with oral meds as tolerated, IV for breakthrough  - encouraged activity as tolerated    DANIA  - fu as outpt    HTN  - slightly elevated rt now likely due to pain  - will get better pain control and reassess       Prophylaxis:   DVT with lmwh      Dispo: pending clinical course    Global A/P  - reviewed labs and vitals from today  - reviewed notes of the day  - cbc, bmp, mag ordered for tomorrow  - discussed need to stay in the hospital today due to need for better pain control   - cont IV pain meds prn   - discussed with patient/RN and care team    MDM: High complexity. IV meds requiring close inpatient monitoring. I  personally spent time on chart/note review, review of labs/imaging, discussion with patient, physical exam, discussion with staff, writing progress note, and discussion of plan of care.      Lela Patel MD  8/20/2024  1:44 PM

## 2024-08-20 NOTE — PROGRESS NOTES
[facility] / [facility]  Ashok Andersen / Bela / Aan Lilia / Chani / Thuan  Office - [phone]  Answering Service [phone]      Progress Note    Dilcia Garcia Patient Status:  Outpatient in a Bed    1990 MRN [id number]   Location [facility] 4W/SW/SE Attending Pedro Larose MD   Hosp Day # 0 PCP Grace Montenegro MD     Subjective:  Feels well, not drinking much, not recorded.  No N/V    Objective/Physical Exam:  Temp:  [97.4 °F (36.3 °C)-99.7 °F (37.6 °C)] 98 °F (36.7 °C)  Pulse:  [54-84] 68  Resp:  [10-16] 16  BP: (100-139)/(47-90) 137/90  SpO2:  [97 %-100 %] 98 %    Intake/Output Summary (Last 24 hours) at 2024 0746  Last data filed at 2024 0401  Gross per 24 hour   Intake 3697.5 ml   Output 601 ml   Net 3096.5 ml       General:    nad  Lungs:  unlabored  CV:  RRR, HR 60  Abdomen:  Soft, wounds clean,. No hernias  Current Meds:  Current Facility-Administered Medications   Medication Dose Route Frequency    lactated ringers infusion   Intravenous Continuous    scopolamine (Transderm-Scop) 1 MG/3DAYS patch 1 patch  1 patch Transdermal Once    lactated ringers infusion   Intravenous Continuous    enoxaparin (Lovenox) 40 MG/0.4ML SUBQ injection 40 mg  40 mg Subcutaneous Daily    acetaminophen (Tylenol) 160 MG/5ML oral liquid 1,000 mg  1,000 mg Oral Q8H    ketorolac (Toradol) 30 MG/ML injection 30 mg  30 mg Intravenous Q6H    oxyCODONE immediate release tab 5 mg  5 mg Oral Q6H PRN    morphINE PF 2 MG/ML injection 1 mg  1 mg Intravenous Q2H PRN    Or    morphINE PF 2 MG/ML injection 2 mg  2 mg Intravenous Q2H PRN    Or    morphINE PF 4 MG/ML injection 4 mg  4 mg Intravenous Q2H PRN    ondansetron (Zofran) 4 MG/2ML injection 4 mg  4 mg Intravenous 4 times per day    pantoprazole (Protonix) DR tab 40 mg  40 mg Oral QAM AC       Labs:  Lab Results   Component Value Date    WBC 8.0 2024    HGB 12.1 2024    HCT 35.7 2024    .0  08/20/2024     08/20/2024    K 4.5 08/20/2024    CO2 27.0 08/20/2024    BUN 7 (L) 08/20/2024     (H) 08/20/2024       Imaging:  No results found.    Assessment/Plan:  S/p sleeve, doing well.  Encouraged more po intake.  Prob home tomorrow, otherwise looks well    Sonny Cramer MD  8/20/2024  7:46 AM

## 2024-08-21 NOTE — PAYOR COMM NOTE
--------------  8/20 CONTINUED STAY REVIEW    Payor: [facility]  Subscriber #:  [id number]  Authorization Number: [id number]    HOSPITALIST:    Having expected post op pain.      10 ROS completed and otherwise negative.     OBJECTIVE:  Vital signs in last 24 hours:  BP (!) 155/96   Pulse 58   Temp 98.1 °F (36.7 °C) (Oral)   Resp 16   Ht 5' 4\" (1.626 m)   Wt 262 lb (118.8 kg)   LMP 07/22/2024 (Exact Date)   SpO2 100%   BMI 44.97 kg/m²       Gen: No acute distress  Pulm: Lungs clear, normal respiratory effort  CV: Heart with regular rate and rhythm  Abd: Abdomen soft  Neuro: No acute focal deficits  MSK: moves extremities  Skin: Warm and dry  Psych: Normal affect  Ext: no c/c/e     Lab Results   Component Value Date     WBC 8.0 08/20/2024     HGB 12.1 08/20/2024     HCT 35.7 08/20/2024     .0 08/20/2024     CREATSERUM 0.87 08/20/2024     BUN 7 08/20/2024      08/20/2024     K 4.5 08/20/2024      08/20/2024     CO2 27.0 08/20/2024      08/20/2024     CA 9.5 08/20/2024         Plan:      Morbid obesity - BMI 45  - s/p lap sleeve gastrectomy  - cont bariatric diet protocol  - pain control with oral meds as tolerated, IV for breakthrough  - encouraged activity as tolerated     DANIA  - fu as outpt     HTN  - slightly elevated rt now likely due to pain  - will get better pain control and reassess                     Prophylaxis:   DVT with lmwh        Dispo: pending clinical course     Global A/P  - reviewed labs and vitals from today  - reviewed notes of the day  - cbc, bmp, mag ordered for tomorrow  - discussed need to stay in the hospital today due to need for better pain control   - cont IV pain meds prn   - discussed with patient/RN and care team       NURSING:  Pt alert and oriented, drinking po fluids and encourage her to drink, ivf continuing at 150 ml/hr. Voiding freely. Pt is not able to pass any gas able to do little burping per pt. Pt is ambulating in the gunderson, scheduled liquid  tylenol and Toradol and oxy ir for break through pain.       8/21:     SURGERY:    Overcorrected PO intake, re-oriented to 4 oz per hour goal.   - cont liquids  - correct hydration discussed with pt who verbalizes understanding  - slight Cr bump probably toradol, discontinued  - DC home today  - FU in clinic next week    MEDICATIONS ADMINISTERED IN LAST 1 DAY:  ketorolac (Toradol) 30 MG/ML injection 30 mg       Date Action Dose Route User    8/21/2024 0110 Given 30 mg Intravenous Robina Johnson RN    8/20/2024 1905 Given 30 mg Intravenous Robina Johnson RN    8/20/2024 1413 Given 30 mg Intravenous Betsey Rdz RN          lactated ringers infusion 150/hr      Date Action Dose Route User    8/21/2024 0437 New Bag (none) Intravenous Robina Johnson RN    8/20/2024 1908 New Bag (none) Intravenous Robina Johnson RN    8/20/2024 1223 New Bag (none) Intravenous Betsey Rdz RN          ondansetron (Zofran) 4 MG/2ML injection 4 mg       Date Action Dose Route User    8/20/2024 1823 Given 4 mg Intravenous Betsey Rdz RN    8/20/2024 1223 Given 4 mg Intravenous Betsey Rdz RN       Vitals (last day)       Date/Time Temp Pulse Resp BP SpO2 Weight O2 Device O2 Flow Rate (L/min) [facility]    08/21/24 0735 98.4 °F (36.9 °C) 70 16 120/69 99 % -- None (Room air) -- LF    08/21/24 0456 98.2 °F (36.8 °C) 54 16 130/90 100 % -- None (Room air) -- LH    08/20/24 1909 97.8 °F (36.6 °C) 63 18 128/90 98 % -- None (Room air) -- IL    08/20/24 1231 -- -- -- -- -- 262 lb (118.8 kg) -- -- CW    08/20/24 0840 -- -- -- 155/96 -- -- -- -- NT    08/20/24 0839 98.1 °F (36.7 °C) 58 16 154/103 100 % -- None (Room air) -- NT    08/20/24 0346 98 °F (36.7 °C) 68 16 137/90 98 % -- None (Room air) -- AA    08/20/24 0016 97.9 °F (36.6 °C) 54 16 137/85 100 % -- None (Room air) -- AA

## 2024-08-21 NOTE — DISCHARGE SUMMARY
[facility]  part of [facility]    Discharge Summary    Dilcia Garcia Patient Status:  Inpatient    1990 MRN [id number]   Location [facility] 4W/SW/SE Attending Marianna Carroll MD   Hosp Day # 2 PCP Grace Montenegro MD     Date of Admission: 2024 Disposition: Home or Self Care     Date of Discharge: 24      Admitting Diagnosis: Obesity, BMI, HTN, DANIA, osteoarthritis left knee  Morbid (severe) obesity due to excess calories (HCC)    Hospital Discharge Diagnoses:  Obestiy    Lace+ Score: 15  59-90 High Risk  29-58 Medium Risk  0-28   Low Risk.    TCM Follow-Up Recommendation:  LACE < 29: Low Risk of readmission after discharge from the hospital. No TCM follow-up needed.      Problem List:   Patient Active Problem List   Diagnosis    Morbid obesity with BMI of 40.0-44.9, adult (HCC)    DANIA on CPAP    Encounter for therapeutic drug monitoring    Periodic headache syndrome, not intractable    Essential hypertension    Increased appetite    Severe pre-eclampsia, with delivery (HCC)    Lumbar sprain    Tenosynovitis, de Quervain    Vitamin D deficiency    'Light-for-dates' infant with signs of fetal malnutrition (HCC)    Lumbar back pain    Peritonsillar abscess    Sprain of right knee    Morbid (severe) obesity due to excess calories (HCC)       Reason for Admission:   Morbid obesity - BMI 45  DANIA  HTN    Physical Exam:   General appearance: alert, appears stated age and cooperative  Pulmonary:  clear to auscultation bilaterally  Cardiovascular: S1, S2 normal, no murmur, click, rub or gallop, regular rate and rhythm  Abdominal: soft, + BS, Incision CDI  Extremities: extremities normal, atraumatic, no cyanosis or edema  Psychiatric: calm      History of Present Illness:   Please refer to preop H+P for details    Hospital Course:   Morbid obesity - BMI 45  - s/p lap sleeve gastrectomy  - cont bariatric diet protocol  - pain control with oral meds as tolerated, IV for  breakthrough  - encouraged activity as tolerated     DANIA  - fu as outpt     HTN  Cont op monitoring    Consultations:   bariatrics    Procedures: s/p lap sleeve gastrectomy    Complications: n/a    Discharge Condition: Good    Discharge Medications:      Discharge Medications        START taking these medications        Instructions Prescription details   acetaminophen 160 MG/5ML Soln  Commonly known as: Tylenol      Take 31.25 mL (1,000 mg total) by mouth every 8 (eight) hours.   Quantity: 473 mL  Refills: 1     oxyCODONE 5 MG Tabs  Notes to patient: For pain      Take 1 tablet (5 mg total) by mouth every 6 (six) hours as needed.   Quantity: 10 tablet  Refills: 0     pantoprazole 40 MG Tbec  Commonly known as: Protonix      Take 1 tablet (40 mg total) by mouth every morning before breakfast.   Quantity: 30 tablet  Refills: 2            STOP taking these medications      aprepitant 40 MG Caps  Commonly known as: Emend        cyclobenzaprine 10 MG Tabs  Commonly known as: Flexeril        ergocalciferol 1.25 MG (35249 UT) Caps  Commonly known as: Vitamin D2        hydroCHLOROthiazide 12.5 MG Tabs                  Where to Get Your Medications        These medications were sent to [facility]/pharmacy #[id number] - [place], [place] - [address] [phone], [phone] [address] [facility] [address]      Phone: [phone]   acetaminophen 160 MG/5ML Soln  oxyCODONE 5 MG Tabs  pantoprazole 40 MG Tbec         Follow up Visits: Follow-up with pcp as needed    Follow up Labs: n/a     Other Discharge Instructions: follow-up with bariatrics as instructed    ANIKA BETHEA MD  8/21/2024  12:49 PM    > 35 min

## 2024-08-21 NOTE — DISCHARGE INSTRUCTIONS
Discharge Instructions for Bariatric Surgery         You have had a procedure called bariatric surgery that should help you lose weight and decrease your risk of health problems such as diabetes, respiratory problems, and coronary artery disease. During this procedure, a doctor surgically changed your stomach (and maybe small intestine as well). Now that your stomach is smaller in size, it can only hold a small amount of food at one time and affects how you absorb your nutrients.    This surgery  makes it difficult for you to eat large amounts of solid foods and will require you to  eat very small meals.  If you eat too much food, or eat too fast, you can experience unpleasant symptoms such as nausea, vomiting, or pain in your upper belly (abdomen).         It is important for you to make lifelong behavior changes to lose and keep off the weight as part of your bariatric surgery post-operative plan         How You Should Eat After Bariatric Surgery:    ·         Stage I: Bariatric Clear Liquid Diet will begin during your hospitalization.    ·         Stage II: Begin the Bariatric Full Liquid Diet on Thursday (unless otherwise instructed) and stay on this diet for one (1) week.    ·         Stage III: Start the Bariatric Pureed Diet and follow this for one (1) week before advancing to the next stage.    ·         Stage IV: You may now begin the Bariatric Soft Diet.    ·         If you experience abdominal discomfort while advancing your diet through these stages, you may revert back to the previous diet stage until your symptoms subside. Please refer to your Bariatric Surgery Manual for recommendations.    ·         It is important to follow these diet recommendations as instructed. Introducing semi-solid food or a solid diet too early may lead to blockage, pain and vomiting and can create stress on the newly formed stomach.    ·         It is important that you continue to drink liquids throughout the day so that  you do not become dehydrated. Some signs of dehydration include dry mouth and dark urine. Aim for 64 ounces of water per day.    ·         You will need to drink fluids in smaller amounts than you are used to, to make it easier for your body to absorb the liquid. .    ·         Liquids should be avoided for a period of 15 minutes before and 30 minutes after eating solid food or meals.    ·         Avoid drinks that are caffeinated, sugary and/or carbonated.    ·         Eat slowly. Plan on taking at least 20-30 minutes to finish a meal. Eating too much or too fast may cause pain, nausea and vomiting. Remember \"slowly, small, moist, and easy\" when eating.    ·         Take small bites of food.    ·         To help as a visual aid, use a saucer or small plate in place of a full-size dinner plate to help with portion control.    ·         Pay attention to taste. Learn how to savor food.    ·         Have three (3) meals per day and (1) small snack. These meals should be spaced out and should correspond to standard mealtimes (breakfast, lunch, afternoon snack and dinner).    ·         \"Grazing\" or eating frequent, small amounts of food throughout the day will sabotage weight loss.    ·         Eat protein first, then vegetables, followed by fruits and high-quality carbohydrates or starches.    ·         Stop eating before you feel full.    ·         To avoid the unpleasant effects of “dumping syndrome”  that can happen after gastric bypass surgery, avoid eating foods high in sugar or fat.    ·         Dumping Syndrome symptoms include abdominal pain, nausea, sweating, and bloating. Sometimes diarrhea can happen 60-90 minutes after eating. In some cases, fainting may occur.    ·         Take vitamin supplements as directed by your doctor and dietitian.         Activity    ·         Keep in mind that recovery after surgery can take several weeks. It is normal to feel tired, so rest as needed and do not over-exert  yourself.    ·         Walk as often as you feel able and increase your daily activity slowly.    ·         Do not lift anything heavier than twenty (20) pounds.    ·         Avoid strenuous chores such as vacuuming, lifting full bags of garbage, or carrying groceries until your doctor says it is okay to do so.    ·         Climb stairs slowly and pause every few steps.    ·         You can benefit from simple activities such as walking or gardening.    ·         Ask your doctor or the Exercise Physiologist on how and when to get started on an exercise program.         Home Care    ·         Continue to use your Incentive Spirometer and coughing and deep breathing exercises you learned in the hospital.    ·         Shower daily. Avoid baths, swimming pools, and hot tubs for 1 to 2 weeks after going home. This helps prevent infection at the incision site.    ·         Keep the incision clean and dry. Do not rub your incision when washing and drying.    ·         Wash your incision gently with a mild soap and warm water and then gently pat the incision dry with a towel.    ·         Follow the doctor's instructions about caring for the dressing covering your incision.    ·         If your doctor used Steri-Strips (small white adhesive strips) to close the incision, do not remove them. Let the strips fall of on their own. If they don't come off within two (2) weeks after you were sent home, call your doctor.    ·         Take your medications in crushed or liquid form as instructed by your surgeon or bariatrician.    ·         Keep in mind that your medications might need to be adjusted as you lose weight. Ask your doctor about what changes you should make to your medications.    ·         If you use a CPAP or BiPAP machine for sleep apnea, don't stop using it without talking to your doctor.    ·         Learn to take your own pulse (heart rate) and keep a record of your results.    ·         Ask your doctor when you  can start driving again.    ·         DO NOT drive if you are taking pain medication.    ·         Ask your doctor when you can expect to return to work.         When to Call the Doctor    Call your doctor right away if you have any of the following:    ·         Cloudy or smelly drainage from the incision site    ·         Redness, pain, or increased swelling at the incision site    ·         Fever of 100.4 F (38 C) or higher, or shaking chills    ·         Pulse (heart rate) over 110    ·         Night sweats    ·         Swelling or pain in your calves    ·         Persistent pain, nausea, or left shoulder discomfort    ·         Persistent hiccups    ·         Confusion, depression, or unusual fatigue    ·         Signs of bladder infection (urinating more often than usual, burning, pain, bleeding, or hesitancy when you urinate)    ·         Signs of dehydration (decreased and/or dark urine, dry mouth, decreased skin elasticity.    ·         If you are experiencing sudden difficulty breathing, shortness of breath, or chest pain do not call your doctor, CALL 911 or GO TO THE NEAREST EMERGENCY ROOM IMMEDIATELY       Follow-up with bariatrics as instructed  Call MD with any concerns

## 2024-08-21 NOTE — PLAN OF CARE
Dilcia is from home. Pod 2 gastric sx . Mayte prescribed diet,voiding, passing flartus/belching, denies need for pain med, 5lap [id number] s/s infection. Scds/lovenox, I/o,home with mom-refer to dc record  Problem: Patient Centered Care  Goal: Patient preferences are identified and integrated in the patient's plan of care  Description: Interventions:  - What would you like us to know as we care for you?   - Provide timely, complete, and accurate information to patient/family  - Incorporate patient and family knowledge, values, beliefs, and cultural backgrounds into the planning and delivery of care  - Encourage patient/family to participate in care and decision-making at the level they choose  - Honor patient and family perspectives and choices  Outcome: Adequate for Discharge     Problem: Patient/Family Goals  Goal: Patient/Family Long Term Goal  Description: Patient's Long Term Goal:     Interventions:  -   - See additional Care Plan goals for specific interventions  Outcome: Adequate for Discharge  Goal: Patient/Family Short Term Goal  Description: Patient's Short Term Goal:     Interventions:   -   - See additional Care Plan goals for specific interventions  Outcome: Adequate for Discharge     Problem: PAIN - ADULT  Goal: Verbalizes/displays adequate comfort level or patient's stated pain goal  Description: INTERVENTIONS:  - Encourage pt to monitor pain and request assistance  - Assess pain using appropriate pain scale  - Administer analgesics based on type and severity of pain and evaluate response  - Implement non-pharmacological measures as appropriate and evaluate response  - Consider cultural and social influences on pain and pain management  - Manage/alleviate anxiety  - Utilize distraction and/or relaxation techniques  - Monitor for opioid side effects  - Notify MD/LIP if interventions unsuccessful or patient reports new pain  - Anticipate increased pain with activity and pre-medicate as  appropriate  Outcome: Adequate for Discharge     Problem: SAFETY ADULT - FALL  Goal: Free from fall injury  Description: INTERVENTIONS:  - Assess pt frequently for physical needs  - Identify cognitive and physical deficits and behaviors that affect risk of falls.  - [place] fall precautions as indicated by assessment.  - Educate pt/family on patient safety including physical limitations  - Instruct pt to call for assistance with activity based on assessment  - Modify environment to reduce risk of injury  - Provide assistive devices as appropriate  - Consider OT/PT consult to assist with strengthening/mobility  - Encourage toileting schedule  Outcome: Adequate for Discharge     Problem: DISCHARGE PLANNING  Goal: Discharge to home or other facility with appropriate resources  Description: INTERVENTIONS:  - Identify barriers to discharge w/pt and caregiver  - Include patient/family/discharge partner in discharge planning  - Arrange for needed discharge resources and transportation as appropriate  - Identify discharge learning needs (meds, wound care, etc)  - Arrange for interpreters to assist at discharge as needed  - Consider post-discharge preferences of patient/family/discharge partner  - Complete POLST form as appropriate  - Assess patient's ability to be responsible for managing their own health  - Refer to Case Management Department for coordinating discharge planning if the patient needs post-hospital services based on physician/LIP order or complex needs related to functional status, cognitive ability or social support system  Outcome: Adequate for Discharge     Problem: GASTROINTESTINAL - ADULT  Goal: Achieves appropriate nutritional intake (bariatric)  Description: INTERVENTIONS:  - Monitor for over-consumption  - Identify factors contributing to increased intake, treat as appropriate  - Monitor I&O, WT and lab values  - Obtain nutritional consult as needed  - Evaluate psychosocial factors contributing to  over-consumption  Outcome: Adequate for Discharge     Problem: SKIN/TISSUE INTEGRITY - ADULT  Goal: Skin integrity remains intact  Description: INTERVENTIONS  - Assess and document risk factors for pressure ulcer development  - Assess and document skin integrity  - Monitor for areas of redness and/or skin breakdown  - Initiate interventions, skin care algorithm/standards of care as needed  Outcome: Adequate for Discharge  Goal: Incision(s), wounds(s) or drain site(s) healing without S/S of infection  Description: INTERVENTIONS:  - Assess and document risk factors for pressure ulcer development  - Assess and document skin integrity  - Assess and document dressing/incision, wound bed, drain sites and surrounding tissue  - Implement wound care per orders  - Initiate isolation precautions as appropriate  - Initiate Pressure Ulcer prevention bundle as indicated  Outcome: Adequate for Discharge

## 2024-08-21 NOTE — PROGRESS NOTES
[facility] / [facility]  Ashok Andersen / Bela / Ana Lilia / Chani / Thuan  Office - [phone]  Answering Service [phone]      Progress Note    Dilcia Garcia Patient Status:  Outpatient in a Bed    1990 MRN [id number]   Location [facility] 4W/SW/SE Attending Pedro Larose MD   Hosp Day # 2 PCP Grace Montenegro MD     Subjective:  Drinking a lot now, some hours hit 8 oz! Felt really full but no n/v, pain controlled.    Objective/Physical Exam:  Temp:  [97.8 °F (36.6 °C)-98.4 °F (36.9 °C)] 98.4 °F (36.9 °C)  Pulse:  [54-70] 70  Resp:  [16-18] 16  BP: (120-155)/() 120/69  SpO2:  [98 %-100 %] 99 %    Intake/Output Summary (Last 24 hours) at 2024 0826  Last data filed at 2024 0456  Gross per 24 hour   Intake 7029 ml   Output 2650 ml   Net 4379 ml       General:    nad  Lungs:  unlabored  CV:  RRR, HR 50-70s  Abdomen:  Soft, wounds clean, minimal ttp    Current Meds:  Current Facility-Administered Medications   Medication Dose Route Frequency    scopolamine (Transderm-Scop) 1 MG/3DAYS patch 1 patch  1 patch Transdermal Once    lactated ringers infusion   Intravenous Continuous    enoxaparin (Lovenox) 40 MG/0.4ML SUBQ injection 40 mg  40 mg Subcutaneous Daily    acetaminophen (Tylenol) 160 MG/5ML oral liquid 1,000 mg  1,000 mg Oral Q8H    oxyCODONE immediate release tab 5 mg  5 mg Oral Q6H PRN    morphINE PF 2 MG/ML injection 1 mg  1 mg Intravenous Q2H PRN    Or    morphINE PF 2 MG/ML injection 2 mg  2 mg Intravenous Q2H PRN    Or    morphINE PF 4 MG/ML injection 4 mg  4 mg Intravenous Q2H PRN    ondansetron (Zofran) 4 MG/2ML injection 4 mg  4 mg Intravenous 4 times per day    pantoprazole (Protonix) DR tab 40 mg  40 mg Oral QAM AC       Labs:  Lab Results   Component Value Date    WBC 6.8 2024    HGB 11.3 (L) 2024    HCT 33.9 (L) 2024    .0 2024     2024    K 3.9 2024    CO2 30.0 2024    BUN  8 (L) 08/21/2024    GLU 80 08/21/2024       Imaging:  No results found.    Assessment/Plan:  S/p sleeve, doing great.  Overcorrected PO intake, re-oriented to 4 oz per hour goal.   - cont liquids  - correct hydration discussed with pt who verbalizes understanding  - slight Cr bump probably toradol, discontinued  - DC home today  - FU in clinic next week    Pedro Larose MD, 08/21/24, 8:27 AM

## 2024-08-22 NOTE — TELEPHONE ENCOUNTER
Post-discharge phone call    1. Any major problems? no  If YES, triage to page surgeon.    2.  Is the abdominal pain to the point where you still need narcotic pain medication? She reports pain as a 5. She has not taken oxycodone since being home and she is taking liquid Tylenol Q6H. She did report heartburn - confirmed she has the Protonix and instructed her to take one JUAN JOSÉ.   If YES, how much and how often are you taking in?    Describe the pain - location, type, etc.  3.  Any of the below?  Fevers: no  Trouble breathing: no  Chest pain: no  Cough:no  Calf pain: no  Heart racing:no  Check heart rate if patient able to do ____  If heart rate > 110 page the surgeon    4.  Any redness or drainage from the incision that is more than a dab of fluid on a band-aid? no  If YES, does the redness extend past the incision more than a cm?    5. Is there tenderness or extreme sensitivity of the area around the incision? no  If YES, to either-- page the surgeon    6.  How much fluid are you taking in daily? 4 oz/hr when awake; water, s/f jello, s/f minute maid  Any vomiting or retching with fluids?  If any major issues --- page the surgeon    7.  What stage of the diet are you up to? Bariatric clear liquid, let her know today she can start introducing protein shakes and smooth greek yogurt into her diet  Any  major issues with the diet?  If YES, -- page the surgeon    8.  How much protein are you taking in daily? Minimal - broth    9.  Are you walking around ok? yes    10.  Do you have your follow-up appointment scheduled? yes    Pt understands and agrees to call the surgeons' office if questions or concerns.

## 2024-08-28 NOTE — PROGRESS NOTES
[facility], [facility], [place]  [address]  [facility] [address]  Dept: [phone]    2024      Bariatric Patient Follow-up Evaluation    Chief Complaint:  Morbid obesity     History of Present Illness:  Dilcia Garcia is a 34 year old-female presenting for surgical follow-up.  Today she weighs 240 pounds which is 22 fewer than last visit.  She has no pain today.  She is approaching but has not yet reached her protein or fluid goals.    Past Medical History:    Past Medical History:    Abnormal Pap smear of cervix    HPV+. Colpo-2013.    Back problem    Chlamydia    COVID-19 affecting pregnancy in second trimester ([facility])    Positive test 2020. Did not take Lovenox.     Decorative tattoo    Genital herpes simplex    Gestational diabetes mellitus (GDM) requiring insulin ([facility])    Insulin    Gonorrhea    treated    Heart murmur    Had a heart murmur when she was born until 10 years.  Does not take any medicine.    History of chicken pox    History of  delivery    26-28 wk. Delivered for severe preeclampsia, IUGR.     History of prior pregnancy with IUGR     Hx of motion sickness    Hypertension    Had pre clampsia during pregnancy and pt was induced to have her baby.     Low back pain during pregnancy in first trimester ([facility])    Hospitalized with severe low back pain in 1st trimester of pregnancy. Could not walk without morphine. Physical therapy.     Migraine    Morbid obesity with BMI of 45.0-49.9, adult ([facility])    Pre-preg BMI 47.5     Obesity    DANIA on CPAP    Periodic headache syndrome, not intractable    Preeclampsia, third trimester ([facility])    BP elevated at 28+ wk. Elevated urine protein 814 mg at 29w0d (3/19/2021). (3/18/2021) NEGATIVE lupus anticoagulant, beta 2 glycoprotein Ab, anticardiolipin.      Primary osteoarthritis of left knee    S/P laparoscopic sleeve gastrectomy    Severe preeclampsia ([facility])    Delivered at 28 wk at  [facility]     Sleep apnea       Past Surgical History:    Past Surgical History:   Procedure Laterality Date           at 28 wk for severe pre-eclampsia - [facility]       2014      2021    Repeat LTCS at 32w0d - oligohydramnios, decreased fetal movement, preeclampsia with severe features Dr. Emi Benson, [facility] [place].     Colposcopy, cervix w/upper adjacent vagina; w/biopsy(s), cervix  2013     Childhood umbilical hernia repair, no mesh reported    Family History:  Grandparents had DM and lung cancer (smokers)    Social History:    Social History     Socioeconomic History    Marital status: Legally    Tobacco Use    Smoking status: Never    Smokeless tobacco: Never   Vaping Use    Vaping status: Never Used   Substance and Sexual Activity    Alcohol use: Not Currently     Alcohol/week: 0.0 standard drinks of alcohol     Comment: SOCIALLY    Drug use: No    Sexual activity: Not Currently     Partners: Male   Other Topics Concern    Caffeine Concern No    Exercise Yes    Special Diet No    Weight Concern Yes    Pt has a pacemaker No    Pt has a defibrillator No    Reaction to local anesthetic No       Medications:   Current Outpatient Medications:     acetaminophen 160 MG/5ML Oral Solution, Take 31.25 mL (1,000 mg total) by mouth every 8 (eight) hours., Disp: 473 mL, Rfl: 1    oxyCODONE 5 MG Oral Tab, Take 1 tablet (5 mg total) by mouth every 6 (six) hours as needed., Disp: 10 tablet, Rfl: 0    pantoprazole 40 MG Oral Tab EC, Take 1 tablet (40 mg total) by mouth every morning before breakfast., Disp: 30 tablet, Rfl: 2     Allergies:    Allergies   Allergen Reactions    Povidone Iodine RASH       ROS:          Constitutional: negative  HEENT: negative  Respiratory: negative  Cardiovascular: negative  Gastrointestinal: negative  Genitourinary: negative  Musculoskeletal: negative  Neurological: negative  Psychological: negative  Endocrine:  negative  Immunologic: negative  Integumentary: negative        Physical Exam:  Vital Signs:  /78   Pulse 73   Temp 97.2 °F (36.2 °C)   Ht 5' 4\" (1.626 m)   Wt 240 lb (108.9 kg)   LMP 07/22/2024 (Exact Date)   SpO2 98%   BMI 41.20 kg/m²   BMI:  Body mass index is 41.2 kg/m².  General: no acute distress, well-nourished  HENT: normocephalic, atraumatic  Lung: breathing comfortably, symmetrical expansion, no wheezing  Heart: regular rate and rhythm  Abdomen: soft, non-tender, non-distended, no hernia/mass/organomegaly, incisions healing well, no sign of infection  Extremities: normal strength, normal ROM, walks without assist device  Neuro: no focal deficits, cranial nerves grossly intact  Psych: alert and oriented, normal affect  Skin: warm, dry      Assessment: 34 year old  female with chronic morbid obesity, doing well after sleeve gastrectomy.    Bariatrician -Lexi      Plan:      Cont diet advancements per post-op schedule  Cont fluid and protein goals  Cont PPI for 3 months  Exercise - walking ok, may gradually advance activity in 1 week, no restrictions at 6 weeks.  She asked me about starting with a  next week and I thought it would be better to wait another 3 weeks  Follow-up as scheduled for 6 week post-op visit    Pedro Larose MD, 08/28/24, 12:34 PM

## 2024-08-29 NOTE — PROGRESS NOTES
Provided/reviewed bariatric post-op orientation and Bariatric HELP card; instructed pt to aim for 64 ozs fluids/day; pt reports drinking 32 ozs/day, will work to move up to 48 ozs then to 64 ozs/day; taking bariatric choice 1 per day advised should be 4 per day she can take in the morning and the other two in the evening as tolerated ; meds to take/avoid; activities/allowed/avoid; signs and symptoms of concern; contact info; also instructed pt to keep card in wallet and in the unlikely event pt ends up int ED/IC/UC to present card to MD and inform bariatric staff; pt agreed and verbalized understanding.

## 2024-09-09 NOTE — PROGRESS NOTES
[facility] BARIATRIC AND WEIGHT LOSS CLINIC  [address]  [facility] [address]  Dept: [phone]  Loc: [phone]    09/09/24    Bariatric Follow-up Nutrition Session  Dilcia Garcia is a 34 year old female.     Assessment   Procedure:  Vertical Sleeve Gastrectomy  Here for medical weight management: yes  Surgery Date:  8/19/24  Medical Diagnosis:  morbidly obese    Labs:  Lab Results   Component Value Date    GLU 80 08/21/2024    BUN 8 (L) 08/21/2024    BUNCREA 7.5 (L) 08/21/2024    CREATSERUM 1.07 (H) 08/21/2024    ANIONGAP 2 08/21/2024    GFRNAA 88 12/09/2021    GFRAA 101 12/09/2021    CA 9.2 08/21/2024    OSMOCALC 289 08/21/2024    ALKPHO 68 03/08/2024    AST 15 03/08/2024    ALT 10 03/08/2024    BILT 0.6 03/08/2024    TP 7.8 03/08/2024    ALB 4.4 03/08/2024    GLOBULIN 3.4 03/08/2024     08/21/2024    K 3.9 08/21/2024     08/21/2024    CO2 30.0 08/21/2024     Lab Results   Component Value Date    MG 2.0 08/21/2024      Phosphorus (mg/dL)   Date Value   09/09/2020 2.4 (L)   04/18/2018 3.1       Thyroid:    Lab Results   Component Value Date    TSH 0.958 03/08/2024    TSH 1.020 08/17/2022    TSH 0.798 12/09/2021       Iron Panel:  Lab Results   Component Value Date    IRON 60 03/08/2024    TRANSFERRIN 243 (L) 03/08/2024    TIBCP 362 03/08/2024    SAT 17 03/08/2024       CBC:  Lab Results   Component Value Date    WBC 6.8 08/21/2024    WBC 8.0 08/20/2024    WBC 4.2 08/16/2024     Lab Results   Component Value Date    HEMOGLOBIN 13.4 05/23/2024    HGB 11.3 (L) 08/21/2024    HGB 12.1 08/20/2024    HGB 13.0 08/16/2024      Lab Results   Component Value Date    .0 08/21/2024    .0 08/20/2024    .0 08/16/2024       Diabetes:    Lab Results   Component Value Date     03/08/2024    A1C 5.4 03/08/2024       Lipid Panel:  Lab Results   Component Value Date    CHOLEST 195 03/08/2024    TRIG 85 03/08/2024    HDL 51 03/08/2024     (H)  03/08/2024    VLDL 15 03/08/2024    NONHDLC 144 (H) 03/08/2024        Vitamins/Minerals:  Lab Results   Component Value Date    B12 780 03/08/2024     Lab Results   Component Value Date    VITD 23.0 (L) 03/08/2024     Lab Results   Component Value Date/Time    THIAMINE 109 08/17/2022 08:17 AM      Lab Results   Component Value Date/Time    VITB1 109.2 03/08/2024 11:35 AM     Lab Results   Component Value Date/Time    FOLIC 20.6 03/08/2024 11:35 AM        Meds:     Current Outpatient Medications:     acetaminophen 160 MG/5ML Oral Solution, Take 31.25 mL (1,000 mg total) by mouth every 8 (eight) hours., Disp: 473 mL, Rfl: 1    oxyCODONE 5 MG Oral Tab, Take 1 tablet (5 mg total) by mouth every 6 (six) hours as needed., Disp: 10 tablet, Rfl: 0    pantoprazole 40 MG Oral Tab EC, Take 1 tablet (40 mg total) by mouth every morning before breakfast., Disp: 30 tablet, Rfl: 2    Surgery Date: 8/19/24  Surgery Weight: 262 lbs  Weight change:  26 lbs  IBW: 145 lbs  EBWL: 117 lbs  Post-Op Excess Body Weight Loss: 22% EBWL  BMI: Body mass index is 40.49 kg/m².    Social:  Household: single, 3 children (16,9,3)  Occupation: youth  (YDS) for [employer]    Number of Consults with Eunice Mcmahan RDN: 1  Previously saw Nicole Alan RDN 9 x (9/28/22-7/19/24)  Previously saw Ana De Paz RDN 1 x (3/21/23)  Previously saw Arthur Banks RDN 1 x (9/22/20)  Previously saw Angella Carrington RDN 1 x (11/6/19)  Previously saw Elizabeth Pérez RDN 3 X (2/28/18-5/2/18)    Weight Loss Medications: none  Bariatric Vitamins: Bariatric Choice    Patient started weight loss clinic on 10/26/17 with initial weight of 241 lbs. Would like to reach a goal weight of 180 lbs. Today's Ht 5' 4.02\" (1.626 m)   Wt 236 lb (107 kg)   LMP 07/22/2024 (Exact Date)   BMI 40.49 kg/m²  indicating a 5 lbs weight loss since stating the WLC.     Weight:    Wt Readings from Last 6 Encounters:   08/28/24 240 lb (108.9 kg)   08/20/24 262 lb (118.8 kg)    07/24/24 262 lb (118.8 kg)   07/19/24 261 lb 9.6 oz (118.7 kg)   07/01/24 256 lb 1.6 oz (116.2 kg)   06/26/24 255 lb 11.2 oz (116 kg)       Diet History:  Patient seen in clinic today for nutrition counseling to assist with weight loss. Patient had SVG with .  Reports certain challenges/barriers associated with weight loss. She did not take any oxycodone during recovery. Heartburn is being well controlled with Protonix. Her portions at meals are 4 oz and it takes her 30 minutes to eat a meal. Patient is taking small bites (quarter sized) and is chewing each bite until liquefied before swallowing. Patient takes small sips and is  their beverages from the meals by 30 minutes. Patient has eliminated caffeine, alcohol, and carbonated beverages. Patient has stopped using straws and chew gum. Discussed typical diet which consists of 3 meals and 2 snacks/day. She is not currently tracking her meals. She is aiming for <50 grams CHO/day. She is currently eating meals at consistent times. She is responsible for grocery shopping and cooking. She is not eating out currently but we did discuss thing to grab when she is out. No food log provided; verbal dietary recall listed below.    Typical Diet:  Breakfast: (8:00 - 10:00 am) 4 oz of eggs and turkey sausage  Snack: (10:00 - 11:00 am) Premier Protein protein shake  Lunch: (3:00 pm) chicken breat and vegetable OR meat and vegetable soup  Snack: pickles (skinned)  Dinner: (6:00 - 7:00 pm) Premier Protein protein shake + 2 oz meat  Snack: SKIPS    Beverages: water  Alcoholic Beverages: N/A      Patient has made some modifications and adjustments to diet: yes, see above  Food Allergies: NKFA  Food intolerances:  Lactose intolerant  Trigger Foods: N/A  Vitamin/mineral supplements:  Bariatric Choice  Protein supplements:  Fair Life, Premier Protein, Ensure    Exercise:  PT for her knee and back    Nutrition Diagnosis     Nutrition Diagnosis: Morbid obesity:  Improvement shown     Intervention   Education:  Review of Food Intake (pt provided food log/journal - no)  Importance of keeping a food log  Discussion of food modifications to current diet (see GOALS)  Discussion of Liquid Protein diet 2 weeks before surgery  Discussion of diet progression stages 1-4 s/p surgery  Discussed ideas for breakfast. Lunch, dinner and snacks  Basic nutrition education:  Discussion of food groups and what constitutes a serving  Discussion of number of servings  in each food group to have per meal or snack  Discussion of high fiber vs refined carbs; use of low-fat protein; saturates vs unsaturated fats  Importance of eating consistently and not skipping meals  Importance of protein for satiety  Discussed the importance of meal planning  Discussion of need for exercise for weight loss (see GOALS)    Monitor/Evaluate   Goals:  Keep a food log (My Net Diary, QuantiaMD) as an effective tool to help track food choices  Focus on healthy plate; 1/2 plate vegetables, 1/4 plate protein, 1/4 plate carbohydrates  Avoid skipping meals - eat every 3-4 hours  Start each meal with protein first  Aim for >60 grams protein/day or 20-30 grams protein/meal  Quick convenient protein options: tuna, deli meat (turkey, chicken, roast beef), pre-cooked shrimp or chicken, greek yogurt, cottage cheese, protein shakes  When choosing yogurt aim for option consisting of 10-15 grams protein and  calories/serving  High protein yogurt examples: Siggi;s, Cypriot, Two Good, Oikos Triple Zero, Dannon light and fit greek, YQ by Yoplait, Fage, Chobani Less Sugar  Aim for <100 grams carbohydrates/day   Aim for 64 oz of water/day  Eat SLOWLY. Meal should take 20-30 minutes to even though you are eating very small amounts   Cut each bite into dime sized pieces  Chew each bite 20-30 x before swallowing  Do not eat and drink at the same time. Do not drink for 30 minutes before or after a meal  Avoid carbonation, straws,  and chewing gum  Eliminate caffeine for at least 3 months and alcohol for at least 1 year after bariatric surgery  Take bariatric vitamins as recommended  Meal plan ahead of time  Use measuring cups and/or food scale for portion control    Exercise:  Stay active - focus on realistic activities that fit into your schedule  Plan ahead of exercise - treat as an appointment  When getting started with exercise, slowly build up duration and intensity  Aim for 30 minutes or more of moderate to vigorous exercise 5 x week  Incorporate strength training 3 x week for at least 10 minutes    Behavior Modification:  Set small goals for self and focus on realistic, attainable changes to work on long-term.    Visit Length: 9:55 am to 11:06 am - 71 minutes

## 2024-09-10 NOTE — PATIENT INSTRUCTIONS
Congratulations on your current weight loss success! Please reach out with any questions of concerns. Thank you for including me in your weight loss journey. Here's a review of today's visit:    Goals:  Keep a food log (My Net Diary, Expertcloud.de) as an effective tool to help track food choices  Focus on healthy plate; 1/2 plate vegetables, 1/4 plate protein, 1/4 plate carbohydrates  Avoid skipping meals - eat every 3-4 hours  Start each meal with protein first  Aim for >60 grams protein/day or 20-30 grams protein/meal  Quick convenient protein options: tuna, deli meat (turkey, chicken, roast beef), pre-cooked shrimp or chicken, greek yogurt, cottage cheese, protein shakes  When choosing yogurt aim for option consisting of 10-15 grams protein and  calories/serving  High protein yogurt examples: Siggi;s, Polish, Two Good, Oikos Triple Zero, Dannon light and fit greek, YQ by Yoplait, Fage, Chobani Less Sugar  Aim for <100 grams carbohydrates/day   Aim for 64 oz of water/day  Eat SLOWLY. Meal should take 20-30 minutes to even though you are eating very small amounts   Cut each bite into dime sized pieces  Chew each bite 20-30 x before swallowing  Do not eat and drink at the same time. Do not drink for 30 minutes before or after a meal  Avoid carbonation, straws, and chewing gum  Eliminate caffeine for at least 3 months and alcohol for at least 1 year after bariatric surgery  Take bariatric vitamins as recommended  Meal plan ahead of time  Use measuring cups and/or food scale for portion control    Exercise:  Stay active - focus on realistic activities that fit into your schedule  Plan ahead of exercise - treat as an appointment  When getting started with exercise, slowly build up duration and intensity  Aim for 30 minutes or more of moderate to vigorous exercise 5 x week  Incorporate strength training 3 x week for at least 10 minutes    Behavior Modification:  Set small goals for self and focus on realistic,  attainable changes to work on long-term.

## 2024-09-11 NOTE — PROGRESS NOTES
[facility]  Progress Note    CHIEF COMPLAINT:    Chief Complaint   Patient presents with    Follow - Up     Lov 24 Lower back pain . Pain 3/10. Continues PT 50% improvement. No T/N.       History of Present Illness:  Dilcia Garcia is a 34 year old female who presents today for follow up for symptoms of lumbar and patellofemoral sprain.  I put her on prednisone and physical therapy.  I have her on light duty work.  She is 50% better.  Pain is 3/10, no radiation.  Previously was 6/10.  Back feels good.  Knee is still sore, has had this problem for a long time.  Has completed PT.    PAST MEDICAL HISTORY:  Past Medical History:    Abnormal Pap smear of cervix    HPV+. Colpo-2013.    Back problem    Chlamydia    COVID-19 affecting pregnancy in second trimester ([facility])    Positive test 2020. Did not take Lovenox.     Decorative tattoo    Genital herpes simplex    Gestational diabetes mellitus (GDM) requiring insulin ([facility])    Insulin    Gonorrhea    treated    Heart murmur    Had a heart murmur when she was born until 10 years.  Does not take any medicine.    History of chicken pox    History of  delivery    26-28 wk. Delivered for severe preeclampsia, IUGR.     History of prior pregnancy with IUGR     Hx of motion sickness    Hypertension    Had pre clampsia during pregnancy and pt was induced to have her baby.     Low back pain during pregnancy in first trimester ([facility])    Hospitalized with severe low back pain in 1st trimester of pregnancy. Could not walk without morphine. Physical therapy.     Migraine    Morbid obesity with BMI of 45.0-49.9, adult ([facility])    Pre-preg BMI 47.5     Obesity    DANIA on CPAP    Periodic headache syndrome, not intractable    Preeclampsia, third trimester ([facility])    BP elevated at 28+ wk. Elevated urine protein 814 mg at 29w0d (3/19/2021). (3/18/2021) NEGATIVE lupus anticoagulant, beta 2 glycoprotein Ab, anticardiolipin.       Primary osteoarthritis of left knee    S/P laparoscopic sleeve gastrectomy    Severe preeclampsia (HCC)    Delivered at 28 wk at [facility]       SURGICAL HISTORY:  Past Surgical History:   Procedure Laterality Date           at 28 wk for severe pre-eclampsia - [place]       2014      2021    Repeat LTCS at 32w0d - oligohydramnios, decreased fetal movement, preeclampsia with severe features Dr. Emi Benson, [facility].     Colposcopy, cervix w/upper adjacent vagina; w/biopsy(s), cervix  2013       SOCIAL HISTORY:   Social History     Occupational History    Not on file   Tobacco Use    Smoking status: Never    Smokeless tobacco: Never   Vaping Use    Vaping status: Never Used   Substance and Sexual Activity    Alcohol use: Not Currently     Alcohol/week: 0.0 standard drinks of alcohol     Comment: SOCIALLY    Drug use: No    Sexual activity: Not Currently     Partners: Male       CURRENT MEDICATIONS:   Current Outpatient Medications   Medication Sig Dispense Refill    acetaminophen 160 MG/5ML Oral Solution Take 31.25 mL (1,000 mg total) by mouth every 8 (eight) hours. 473 mL 1    oxyCODONE 5 MG Oral Tab Take 1 tablet (5 mg total) by mouth every 6 (six) hours as needed. 10 tablet 0    pantoprazole 40 MG Oral Tab EC Take 1 tablet (40 mg total) by mouth every morning before breakfast. 30 tablet 2       ALLERGIES:   Allergies   Allergen Reactions    Povidone Iodine RASH           PHYSICAL EXAM:   Wt 236 lb (107 kg)   LMP 2024 (Exact Date)   BMI 40.49 kg/m²     Body mass index is 40.49 kg/m².      General: No immediate distress  Extremities: No lower extremity edema bilaterally   Spine: full and painfree lumbar ROM in all directions  Knees: Positive patellar grind bilaterally, painful on the right  Neuro:   Cognition: alert & oriented x 3, attentive, able to follow 2 step commands, comprehention intact, spontaneous speech intact  Strength:  Lower extremities have 5/5 strength          ASSESSMENT AND PLAN:  1. Lumbar sprain, initial encounter  Asymptomatic.  She will do her home exercises return to work full duty next Monday.    2. Sprain of right knee, unspecified ligament, initial encounter  Still mildly symptomatic, she will do her home exercises.  Return in 4 weeks.  If no better consider knee injection.      Work Duty Status:   Work Related: Yes  MMI:Yes  Work Status: Full Duty  Disposition:    Return to Work Date: 9/16/2024      The patient was in agreement with the assessment and plan.  All questions were answered.        Davey Bob MD  Physical Medicine and Rehabilitation/Sports Medicine  [facility]

## 2024-09-12 NOTE — TELEPHONE ENCOUNTER
Workmans comp office visit notes from 9-11-24, PT order and work note were faxed to adjustor:  [facility]  Workmans comp  [phone] fax #

## 2024-10-02 NOTE — PROGRESS NOTES
[facility], [place]  [address]  [facility] [address]  Dept: [phone]    10/2/2024     Bariatric Patient Follow-up Evaluation    Chief Complaint:  Morbid obesity, preop  262, 24 sleeve    History of Present Illness:  Dilcia Garcia is a 34 year old-female presenting for surgical follow-up.  Today she weighs 225 pounds which is 15 fewer than last visit.  She has no pain today.  She made it through the diet stages without any issues.    Past Medical History:    Past Medical History:    Abnormal Pap smear of cervix    HPV+. Colpo-2013.    Back problem    Chlamydia    COVID-19 affecting pregnancy in second trimester ([facility])    Positive test 2020. Did not take Lovenox.     Decorative tattoo    Genital herpes simplex    Gestational diabetes mellitus (GDM) requiring insulin ([facility])    Insulin    Gonorrhea    treated    Heart murmur    Had a heart murmur when she was born until 10 years.  Does not take any medicine.    History of chicken pox    History of  delivery    26-28 wk. Delivered for severe preeclampsia, IUGR.     History of prior pregnancy with IUGR     Hx of motion sickness    Hypertension    Had pre clampsia during pregnancy and pt was induced to have her baby.     Low back pain during pregnancy in first trimester ([facility])    Hospitalized with severe low back pain in 1st trimester of pregnancy. Could not walk without morphine. Physical therapy.     Migraine    Morbid obesity with BMI of 45.0-49.9, adult ([facility])    Pre-preg BMI 47.5     Obesity    DANIA on CPAP    Periodic headache syndrome, not intractable    Preeclampsia, third trimester ([facility])    BP elevated at 28+ wk. Elevated urine protein 814 mg at 29w0d (3/19/2021). (3/18/2021) NEGATIVE lupus anticoagulant, beta 2 glycoprotein Ab, anticardiolipin.      Primary osteoarthritis of left knee    S/P laparoscopic sleeve gastrectomy    Severe preeclampsia ([facility])    Delivered at  28 wk at [facility]     Sleep apnea       Past Surgical History:    Past Surgical History:   Procedure Laterality Date           at 28 wk for severe pre-eclampsia - [place] [facility]       2014      2021    Repeat LTCS at 32w0d - oligohydramnios, decreased fetal movement, preeclampsia with severe features Dr. Emi Benson, [facility].     Colposcopy, cervix w/upper adjacent vagina; w/biopsy(s), cervix  2013     Childhood umbilical hernia repair, no mesh reported    Family History:  Grandparents had DM and lung cancer (smokers)    Social History:    Social History     Socioeconomic History    Marital status: Legally    Tobacco Use    Smoking status: Never    Smokeless tobacco: Never   Vaping Use    Vaping status: Never Used   Substance and Sexual Activity    Alcohol use: Not Currently     Alcohol/week: 0.0 standard drinks of alcohol     Comment: SOCIALLY    Drug use: No    Sexual activity: Not Currently     Partners: Male   Other Topics Concern    Caffeine Concern No    Exercise Yes    Special Diet No    Weight Concern Yes    Pt has a pacemaker No    Pt has a defibrillator No    Reaction to local anesthetic No       Medications:   Current Outpatient Medications:     acetaminophen 160 MG/5ML Oral Solution, Take 31.25 mL (1,000 mg total) by mouth every 8 (eight) hours., Disp: 473 mL, Rfl: 1    oxyCODONE 5 MG Oral Tab, Take 1 tablet (5 mg total) by mouth every 6 (six) hours as needed., Disp: 10 tablet, Rfl: 0    pantoprazole 40 MG Oral Tab EC, Take 1 tablet (40 mg total) by mouth every morning before breakfast., Disp: 30 tablet, Rfl: 2     Allergies:    Allergies   Allergen Reactions    Povidone Iodine RASH       ROS:          Constitutional: negative  HEENT: negative  Respiratory: negative  Cardiovascular: negative  Gastrointestinal: negative  Genitourinary: negative  Musculoskeletal: negative  Neurological: negative  Psychological: negative  Endocrine:  negative  Immunologic: negative  Integumentary: negative        Physical Exam:  Vital Signs:  /80   Pulse 72   Ht 5' 4\" (1.626 m)   Wt 225 lb (102.1 kg)   LMP 07/22/2024 (Exact Date)   SpO2 99%   BMI 38.62 kg/m²   BMI:  Body mass index is 38.62 kg/m².  General: no acute distress, well-nourished  HENT: normocephalic, atraumatic  Lung: breathing comfortably, symmetrical expansion, no wheezing  Heart: regular rate and rhythm  Abdomen: soft, non-tender, non-distended, no hernia/mass/organomegaly, incisions healed  Extremities: normal strength, normal ROM, walks without assist device  Neuro: no focal deficits, cranial nerves grossly intact  Psych: alert and oriented, normal affect  Skin: warm, dry      Assessment: 34 year old  female with chronic morbid obesity, doing well after sleeve gastrectomy.    Bariatrician -Lexi      Plan:       Cont portion controlled meals, needs to start logging calories again, misunderstood dietitian instructions and thought she didn't need to start that yet  Cont fluid and protein goals  Cont PPI for total of 3 months  Exercise - may increase activity, no restrictions now  No longer has to crush pills or use chewable tablets  Follow-up as scheduled for 3 month post-op visit    Pedro Larose MD, 10/02/24, 9:27 AM

## 2024-10-12 NOTE — PROGRESS NOTES
Gynecology Office Visit      Dilcia Garcia is a 34 year old female  Patient's last menstrual period was 2024 (exact date). (contraception:  none)     HPI:     Chief Complaint   Patient presents with    Vaginal Problem     CO itchiness and burning x 4 days.       Dilcia presents for evaluation of vaginal itching and burning for the past 4 days. Hx of genital HSV - unsure if she is having an outbreak or if this is an infection. Has been using acyclovir ointment externally and intravaginally to see if that helps but her symptoms persist. Denies abnormal vaginal discharge or odor. Recently had a laparoscopic gastric sleeve surgery 2024 and has been working out more. Endorses pain with urination as well. Has felt down there and describes the area as \"rashy\" but does not feel any obvious sores consistent with HSV. Is currently sexually active.    Chart and previous encounters reviewed.  HISTORY:  Past Medical History:    Abnormal Pap smear of cervix    HPV+. Colpo-2013.    Back problem    Chlamydia    COVID-19 affecting pregnancy in second trimester ([facility])    Positive test 2020. Did not take Lovenox.     Decorative tattoo    Genital herpes simplex    Gestational diabetes mellitus (GDM) requiring insulin ([facility])    Insulin    Gonorrhea    treated    Heart murmur    Had a heart murmur when she was born until 10 years.  Does not take any medicine.    History of chicken pox    History of  delivery    26-28 wk. Delivered for severe preeclampsia, IUGR.     History of prior pregnancy with IUGR     Hx of motion sickness    Hypertension    Had pre clampsia during pregnancy and pt was induced to have her baby.     Low back pain during pregnancy in first trimester ([facility])    Hospitalized with severe low back pain in 1st trimester of pregnancy. Could not walk without morphine. Physical therapy.     Migraine    Morbid obesity with BMI of 45.0-49.9, adult ([facility])    Pre-preg BMI 47.5     Obesity     DANIA on CPAP    Periodic headache syndrome, not intractable    Preeclampsia, third trimester (HCC)    BP elevated at 28+ wk. Elevated urine protein 814 mg at 29w0d (3/19/2021). (3/18/2021) NEGATIVE lupus anticoagulant, beta 2 glycoprotein Ab, anticardiolipin.      Primary osteoarthritis of left knee    S/P laparoscopic sleeve gastrectomy    Severe preeclampsia (HCC)    Delivered at 28 wk at [facility]     Sleep [facility]      Past Surgical History:   Procedure Laterality Date           at 28 wk for severe pre-eclampsia - [facility]       2014      2021    Repeat LTCS at 32w0d - oligohydramnios, decreased fetal movement, preeclampsia with severe features Dr. Emi Benson, [facility].     Colposcopy, cervix w/upper adjacent vagina; w/biopsy(s), cervix  2013      History reviewed. No pertinent family history.   Social History:   Social History     Socioeconomic History    Marital status: Legally    Tobacco Use    Smoking status: Never    Smokeless tobacco: Never   Vaping Use    Vaping status: Never Used   Substance and Sexual Activity    Alcohol use: Not Currently     Alcohol/week: 0.0 standard drinks of alcohol     Comment: SOCIALLY    Drug use: No    Sexual activity: Not Currently     Partners: Male   Other Topics Concern    Caffeine Concern No    Exercise Yes    Special Diet No    Weight Concern Yes    Pt has a pacemaker No    Pt has a defibrillator No    Reaction to local anesthetic No   Social History Narrative    The patient does not use an assistive device..      The patient does  live in a home with stairs.     Social Drivers of Health     Food Insecurity: No Food Insecurity (2024)    Food Insecurity     Food Insecurity: Never true   Transportation Needs: No Transportation Needs (2024)    Transportation Needs     Lack of Transportation: No   Housing Stability: Low Risk  (2024)    Housing Stability     Housing Instability: No         Medications (Active prior to today's visit):  Current Outpatient Medications   Medication Sig Dispense Refill    fluconazole (DIFLUCAN) 150 MG Oral Tab Take 1 tablet by mouth now. May repeat in 72 hrs if symptoms persist. Please call office if not resolved after 2 doses. 2 tablet 0    acetaminophen 160 MG/5ML Oral Solution Take 31.25 mL (1,000 mg total) by mouth every 8 (eight) hours. (Patient not taking: Reported on 10/12/2024) 473 mL 1    oxyCODONE 5 MG Oral Tab Take 1 tablet (5 mg total) by mouth every 6 (six) hours as needed. (Patient not taking: Reported on 10/12/2024) 10 tablet 0    pantoprazole 40 MG Oral Tab EC Take 1 tablet (40 mg total) by mouth every morning before breakfast. (Patient not taking: Reported on 10/12/2024) 30 tablet 2       Allergies:  Allergies[1]    Gyn:  Period Cycle (Days): 28-30  Period Duration (Days): 5  Period Flow: light-heavy  Use of Birth Control (if yes, specify type): None  Hx Prior Abnormal Pap: Yes  Pap Date: 24  Pap Result Notes: neg/neg  Follow Up Recommendation:     OB Hx:  OB History    Para Term  AB Living   5 3 1 2 2 3   SAB IAB Ectopic Multiple Live Births           3      # Outcome Date GA Lbr Ran/2nd Weight Sex Type Anes PTL Lv   5  21 32w0d  3 lb 4.2 oz (1.48 kg) M Caesarean Spinal N GHAZAL      Complications: Preeclampsia, Oligohydramnios   4 Term 14   6 lb 14 oz (3.118 kg) M CS-LTranv   GHAZAL      Complications: Previous  delivery, delivered, with or without mention of antepartum condition   3  07 28w0d  15 oz (0.425 kg) F CS-Unspec   GHAZAL      Birth Comments: Severe preeclampsia      Complications: Preeclampsia, Pre-eclampsia (HCC)   2 AB 06           1 AB 2006 7w0d   U          Obstetric Comments    - female \"Myrna\" -  a ? 26-28 wk - severe preeclampsia.  Baby was only 15 oz and was in the NICU for ~ 6 weeks. Myrna has no sequelae of prematurity.  Regardless of GA, this was  IUGR. The patient has attempted termination at \"5 months\" but went back the following day to have the laminaria removed.  She feels is starting of a termination process is what caused the preeclampsia.    - male \"Pepito\" - Term repeat . Not on aspirin. FOB is the same as  pregnancy, but different from  pregnancy.     - male \"Tayton\" - Morbid obesity, GDM insulin at 28+ wk. Pre-eclampsia with severe BP dx at 29 wk. (3/18/2021) NEGATIVE lupus anticoagulant, beta 2 glycoprotein Ab, anticardiolipin. Oligohydramnios and decreased fetal movement.            ROS:     10 point ROS completed and was negative, except for pertinent positive and negatives stated in the HPI.    PHYSICAL EXAM:   /72   Ht 64\"   Wt 226 lb (102.5 kg)   LMP 2024 (Exact Date)   BMI 38.79 kg/m²      Wt Readings from Last 6 Encounters:   10/12/24 226 lb (102.5 kg)   10/02/24 225 lb (102.1 kg)   24 236 lb (107 kg)   09/10/24 236 lb (107 kg)   24 240 lb (108.9 kg)   24 262 lb (118.8 kg)        Gen:  Oriented, in no acute distress  Abdomen: scaphoid, benign without peritoneal signs, rebound tenderness,   guarding, or masses    Pelvic:      External Genitalia: Normal appearing, no lesions.  Vagina: normal pink mucosa, no lesions, +white discharge (pt recently used acyclovir ointment intravaginally)  no anterior or posterior hernias.  Cervix: multiparous, no lesions , No CMT   Uterus: mobile, non tender, normal size  Adnexa: non tender, no masses, normal size  Rectal: deferred       ASSESSMENT/PLAN:     1. Vaginal itching  - Wet Mount [[id number]]  - fluconazole (DIFLUCAN) 150 MG Oral Tab; Take 1 tablet by mouth now. May repeat in 72 hrs if symptoms persist. Please call office if not resolved after 2 doses.  Dispense: 2 tablet; Refill: 0    2. Candida vaginitis  - fluconazole (DIFLUCAN) 150 MG Oral Tab; Take 1 tablet by mouth now. May repeat in 72 hrs if symptoms persist. Please call office if not  resolved after 2 doses.  Dispense: 2 tablet; Refill: 0    3. Dysuria    Wet mount consistent with yeast infection (elevated pH due to recent intravaginal ointment use)  Rx for diflucan sent  Urine dip - ketones +15, protein +30, negative nitrates/leukocytes; will send culture to r/o UTI as cause of dysuria    Meds This Visit:  Requested Prescriptions     Signed Prescriptions Disp Refills    fluconazole (DIFLUCAN) 150 MG Oral Tab 2 tablet 0     Sig: Take 1 tablet by mouth now. May repeat in 72 hrs if symptoms persist. Please call office if not resolved after 2 doses.       Imaging & Referrals:  None     Return if symptoms worsen or fail to improve.      Shana Gil, JADEN  10/12/2024  9:38 AM         This note was created by [employer] voice recognition. Errors in content may be related to improper recognition by the system; efforts to review and correct have been done but errors may still exist. Please contact me with any questions.    Note to patient and family   The 21st Century Cures Act makes medical notes available to patients in the interest of transparency.  However, please be advised that this is a medical document.  It is intended as [id number] communication.  It is written and medical language may contain abbreviations or verbiage that are technical and unfamiliar.  It may appear blunt or direct.  Medical documents are intended to carry relevant information, facts as evident, and the clinical opinion of the practitioner.           [1]   Allergies  Allergen Reactions    Povidone Iodine RASH

## 2024-10-14 NOTE — PROGRESS NOTES
Dilcia Garcia is a 34 year old female submitting e-visit for dysuria.  HPI:   See answers to questionnaire and [employer] message exchange  Seen by OB/GYN on 10/12/24 for vaginal itching and dysuria.  Urine cx neg.  Treated with fluconazole    Current Outpatient Medications   Medication Sig Dispense Refill    fluconazole (DIFLUCAN) 150 MG Oral Tab Take 1 tablet by mouth now. May repeat in 72 hrs if symptoms persist. Please call office if not resolved after 2 doses. 2 tablet 0    acetaminophen 160 MG/5ML Oral Solution Take 31.25 mL (1,000 mg total) by mouth every 8 (eight) hours. (Patient not taking: Reported on 10/12/2024) 473 mL 1    oxyCODONE 5 MG Oral Tab Take 1 tablet (5 mg total) by mouth every 6 (six) hours as needed. (Patient not taking: Reported on 10/12/2024) 10 tablet 0    pantoprazole 40 MG Oral Tab EC Take 1 tablet (40 mg total) by mouth every morning before breakfast. (Patient not taking: Reported on 10/12/2024) 30 tablet 2      Past Medical History:    Abnormal Pap smear of cervix    HPV+. Colpo-2013.    Back problem    Chlamydia    COVID-19 affecting pregnancy in second trimester ([facility])    Positive test 2020. Did not take Lovenox.     Decorative tattoo    Genital herpes simplex    Gestational diabetes mellitus (GDM) requiring insulin ([facility])    Insulin    Gonorrhea    treated    Heart murmur    Had a heart murmur when she was born until 10 years.  Does not take any medicine.    History of chicken pox    History of  delivery    26-28 wk. Delivered for severe preeclampsia, IUGR.     History of prior pregnancy with IUGR     Hx of motion sickness    Hypertension    Had pre clampsia during pregnancy and pt was induced to have her baby.     Low back pain during pregnancy in first trimester ([facility])    Hospitalized with severe low back pain in 1st trimester of pregnancy. Could not walk without morphine. Physical therapy.     Migraine    Morbid obesity with BMI of 45.0-49.9, adult ([facility])     Pre-preg BMI 47.5     Obesity    DANIA on CPAP    Periodic headache syndrome, not intractable    Preeclampsia, third trimester (HCC)    BP elevated at 28+ wk. Elevated urine protein 814 mg at 29w0d (3/19/2021). (3/18/2021) NEGATIVE lupus anticoagulant, beta 2 glycoprotein Ab, anticardiolipin.      Primary osteoarthritis of left knee    S/P laparoscopic sleeve gastrectomy    Severe preeclampsia (HCC)    Delivered at 28 wk at [place]     Sleep [facility]      Past Surgical History:   Procedure Laterality Date           at 28 wk for severe pre-eclampsia - [place]       2014      2021    Repeat LTCS at 32w0d - oligohydramnios, decreased fetal movement, preeclampsia with severe features Dr. Emi Benson, [facility].     Colposcopy, cervix w/upper adjacent vagina; w/biopsy(s), cervix  2013      No family history on file.   Social History:  Social History     Socioeconomic History    Marital status: Legally    Tobacco Use    Smoking status: Never    Smokeless tobacco: Never   Vaping Use    Vaping status: Never Used   Substance and Sexual Activity    Alcohol use: Not Currently     Alcohol/week: 0.0 standard drinks of alcohol     Comment: SOCIALLY    Drug use: No    Sexual activity: Not Currently     Partners: Male   Other Topics Concern    Caffeine Concern No    Exercise Yes    Special Diet No    Weight Concern Yes    Pt has a pacemaker No    Pt has a defibrillator No    Reaction to local anesthetic No   Social History Narrative    The patient does not use an assistive device..      The patient does  live in a home with stairs.     Social Drivers of Health     Food Insecurity: No Food Insecurity (2024)    Food Insecurity     Food Insecurity: Never true   Transportation Needs: No Transportation Needs (2024)    Transportation Needs     Lack of Transportation: No   Housing Stability: Low Risk  (2024)    Housing Stability     Housing Instability: No          No results found for this or any previous visit (from the past 24 hours).    ASSESSMENT AND PLAN:     Dysuria    Pt recently treated by gyne for vaginal yeast infection; urine cx neg.   Additional e-visit questions sent to pt regarding symptoms.   No reply rec'd for > 24 hours. Pt followed up with OB/GYN 10/15/24.  Refer to [employer] exchange for specific patient instructions    Duration of the E-visit service with this provider:  6 minutes

## 2024-10-15 NOTE — PROGRESS NOTES
Dilcia Garcia is a 34 year old female  Patient's last menstrual period was 2024 (exact date).   Chief Complaint   Patient presents with    Gyn Exam     Pt here burning with urination  since Friday pt recently traeted for yeast infection.   Presenting for evaluation of yeast infection. She was diagnosed with yeast infection and started on Diflucan. She took the second dose 24 hours after first dose. Has been 72 hours since starting treatment. She reports that symptoms are improving but not completely resolved. Discussed repeat Diflucan dose tomorrow and to allow for time for medication to work.     OBSTETRICS HISTORY:  OB History    Para Term  AB Living   5 3 1 2 2 3   SAB IAB Ectopic Multiple Live Births   0 0 0 0 3   Obstetric Comments    - female \"Myrna\" -  a ? 26-28 wk - severe preeclampsia.  Baby was only 15 oz and was in the NICU for ~ 6 weeks. Myrna has no sequelae of prematurity.  Regardless of GA, this was IUGR. The patient has attempted termination at \"5 months\" but went back the following day to have the laminaria removed.  She feels is starting of a termination process is what caused the preeclampsia.    - male \"Pepito\" - Term repeat . Not on aspirin. FOB is the same as  pregnancy, but different from  pregnancy.     - male \"Tayton\" - Morbid obesity, GDM insulin at 28+ wk. Pre-eclampsia with severe BP dx at 29 wk. (3/18/2021) NEGATIVE lupus anticoagulant, beta 2 glycoprotein Ab, anticardiolipin. Oligohydramnios and decreased fetal movement.          GYNE HISTORY:  Patient's last menstrual period was 2024 (exact date).    History   Sexual Activity    Sexual activity: Not Currently    Partners: Male        Hx Prior Abnormal Pap: No  Pap Date: 24  Pap Result Notes: wnl      MEDICAL HISTORY:  Past Medical History:    Abnormal Pap smear of cervix    HPV+. Colpo-2013.    Back problem    Chlamydia    COVID-19 affecting pregnancy  in second trimester ([facility])    Positive test 2020. Did not take Lovenox.     Decorative tattoo    Genital herpes simplex    Gestational diabetes mellitus (GDM) requiring insulin ([facility])    Insulin    Gonorrhea    treated    Heart murmur    Had a heart murmur when she was born until 10 years.  Does not take any medicine.    History of chicken pox    History of  delivery    26-28 wk. Delivered for severe preeclampsia, IUGR.     History of prior pregnancy with IUGR     Hx of motion sickness    Hypertension    Had pre clampsia during pregnancy and pt was induced to have her baby.     Low back pain during pregnancy in first trimester ([facility])    Hospitalized with severe low back pain in 1st trimester of pregnancy. Could not walk without morphine. Physical therapy.     Migraine    Morbid obesity with BMI of 45.0-49.9, adult ([facility])    Pre-preg BMI 47.5     Obesity    DANIA on CPAP    Periodic headache syndrome, not intractable    Preeclampsia, third trimester ([facility])    BP elevated at 28+ wk. Elevated urine protein 814 mg at 29w0d (3/19/2021). (3/18/2021) NEGATIVE lupus anticoagulant, beta 2 glycoprotein Ab, anticardiolipin.      Primary osteoarthritis of left knee    S/P laparoscopic sleeve gastrectomy    Severe preeclampsia ([facility])    Delivered at 28 wk at [place]     Sleep apnea         SURGICAL HISTORY:  Past Surgical History:   Procedure Laterality Date           at 28 wk for severe pre-eclampsia - [place]       2014      2021    Repeat LTCS at 32w0d - oligohydramnios, decreased fetal movement, preeclampsia with severe features Dr. Emi Benson, [facility].     Colposcopy, cervix w/upper adjacent vagina; w/biopsy(s), cervix  2013       SOCIAL HISTORY:  Social History     Socioeconomic History    Marital status: Legally      Spouse name: Not on file    Number of children: Not on file    Years of education: Not on file    Highest education level:  Not on file   Occupational History    Not on file   Tobacco Use    Smoking status: Never    Smokeless tobacco: Never   Vaping Use    Vaping status: Never Used   Substance and Sexual Activity    Alcohol use: Not Currently     Alcohol/week: 0.0 standard drinks of alcohol     Comment: SOCIALLY    Drug use: No    Sexual activity: Not Currently     Partners: Male   Other Topics Concern    Caffeine Concern No    Exercise Yes    Seat Belt Not Asked    Special Diet No    Stress Concern Not Asked    Weight Concern Yes    Grew up on a farm Not Asked    History of tanning Not Asked    Outdoor occupation Not Asked    Pt has a pacemaker No    Pt has a defibrillator No    Breast feeding Not Asked    Reaction to local anesthetic No   Social History Narrative    The patient does not use an assistive device..      The patient does  live in a home with stairs.     Social Drivers of Health     Financial Resource Strain: Not on file   Food Insecurity: No Food Insecurity (8/19/2024)    Food Insecurity     Food Insecurity: Never true   Transportation Needs: No Transportation Needs (8/19/2024)    Transportation Needs     Lack of Transportation: No     Car Seat: Not on file   Physical Activity: Not on file   Stress: Not on file   Social Connections: Not on file   Housing Stability: Low Risk  (8/19/2024)    Housing Stability     Housing Instability: No     Housing Instability Emergency: Not on file     Crib or Bassinette: Not on file         MEDICATIONS:    Current Outpatient Medications:     fluconazole (DIFLUCAN) 150 MG Oral Tab, Take 1 tablet (150 mg total) by mouth once for 1 dose., Disp: 1 tablet, Rfl: 0    fluconazole (DIFLUCAN) 150 MG Oral Tab, Take 1 tablet by mouth now. May repeat in 72 hrs if symptoms persist. Please call office if not resolved after 2 doses. (Patient not taking: Reported on 10/15/2024), Disp: 2 tablet, Rfl: 0    ALLERGIES:  Allergies[1]      Review of Systems:  Review of Systems   All other systems reviewed and  are negative.       Vitals:    10/15/24 1349   BP: 108/75   Pulse: 63       PHYSICAL EXAM:   Constitutional: well developed, well nourished  Head/Face: normocephalic  Skin/Hair: no unusual rashes or bruises  Extremities: no edema, no cyanosis  Psychiatric:  Oriented to time, place, person and situation. Appropriate mood and affect      Assessment & Plan:  Dilcia was seen today for gyn exam.    Diagnoses and all orders for this visit:    Burning with urination  -     POC Urinalysis, Manual Dip without microscopy [[id number]]    Other orders  -     fluconazole (DIFLUCAN) 150 MG Oral Tab; Take 1 tablet (150 mg total) by mouth once for 1 dose.        Requested Prescriptions     Signed Prescriptions Disp Refills    fluconazole (DIFLUCAN) 150 MG Oral Tab 1 tablet 0     Sig: Take 1 tablet (150 mg total) by mouth once for 1 dose.       Diflucan dose tomorrow. Can use Monistat 3 starting tonight. RTC in 1 week if symptoms continue for vaginal culture.        [1]   Allergies  Allergen Reactions    Povidone Iodine RASH

## 2024-11-13 NOTE — PROGRESS NOTES
[facility], [facility], [place]  [address]  [address]  [facility] [address]  Dept: [phone]    2024     Bariatric Patient Follow-up Evaluation    Chief Complaint:  Morbid obesity, preop  262, 24 sleeve    History of Present Illness:  Dilcia Garcia is a 34 year old-female presenting for surgical follow-up.  Today she weighs 217 pounds which is 8 fewer than last visit.  No issues since the last time.  She continues to work with her  2-3 times a week.  She does not really do anything on the off days but her work keeps her busy.  Calories in the 800 range.    Past Medical History:    Past Medical History:    Abnormal Pap smear of cervix    HPV+. Colpo-2013.    Back problem    Chlamydia    COVID-19 affecting pregnancy in second trimester ([facility])    Positive test 2020. Did not take Lovenox.     Decorative tattoo    Genital herpes simplex    Gestational diabetes mellitus (GDM) requiring insulin ([facility])    Insulin    Gonorrhea    treated    Heart murmur    Had a heart murmur when she was born until 10 years.  Does not take any medicine.    History of chicken pox    History of  delivery    26-28 wk. Delivered for severe preeclampsia, IUGR.     History of prior pregnancy with IUGR     Hx of motion sickness    Hypertension    Had pre clampsia during pregnancy and pt was induced to have her baby.     Low back pain during pregnancy in first trimester ([facility])    Hospitalized with severe low back pain in 1st trimester of pregnancy. Could not walk without morphine. Physical therapy.     Migraine    Morbid obesity with BMI of 45.0-49.9, adult ([facility])    Pre-preg BMI 47.5     Obesity    DANIA on CPAP    Periodic headache syndrome, not intractable    Preeclampsia, third trimester ([facility])    BP elevated at 28+ wk. Elevated urine protein 814 mg at 29w0d (3/19/2021). (3/18/2021) NEGATIVE lupus anticoagulant, beta 2 glycoprotein Ab, anticardiolipin.       Primary osteoarthritis of left knee    S/P laparoscopic sleeve gastrectomy    Severe preeclampsia (HCC)    Delivered at 28 wk at [facility]       Past Surgical History:    Past Surgical History:   Procedure Laterality Date           at 28 wk for severe pre-eclampsia - [place]       2014      2021    Repeat LTCS at 32w0d - oligohydramnios, decreased fetal movement, preeclampsia with severe features Dr. Emi Benson, [facility].     Colposcopy, cervix w/upper adjacent vagina; w/biopsy(s), cervix  2013     Childhood umbilical hernia repair, no mesh reported    Family History:  Grandparents had DM and lung cancer (smokers)    Social History:    Social History     Socioeconomic History    Marital status: Legally    Tobacco Use    Smoking status: Never    Smokeless tobacco: Never   Vaping Use    Vaping status: Never Used   Substance and Sexual Activity    Alcohol use: Not Currently     Alcohol/week: 0.0 standard drinks of alcohol     Comment: SOCIALLY    Drug use: No    Sexual activity: Not Currently     Partners: Male   Other Topics Concern    Caffeine Concern No    Exercise Yes    Special Diet No    Weight Concern Yes    Pt has a pacemaker No    Pt has a defibrillator No    Reaction to local anesthetic No       Medications:   Current Outpatient Medications:     fluconazole (DIFLUCAN) 150 MG Oral Tab, Take 1 tablet by mouth now. May repeat in 72 hrs if symptoms persist. Please call office if not resolved after 2 doses. (Patient not taking: Reported on 10/15/2024), Disp: 2 tablet, Rfl: 0     Allergies:    Allergies   Allergen Reactions    Povidone Iodine RASH       ROS:          Constitutional: negative  HEENT: negative  Respiratory: negative  Cardiovascular: negative  Gastrointestinal: negative  Genitourinary: negative  Musculoskeletal: negative  Neurological: negative  Psychological: negative  Endocrine: negative  Immunologic:  negative  Integumentary: negative        Physical Exam:  Vital Signs:  /80   Pulse 83   Ht 5' 4\" (1.626 m)   Wt 217 lb (98.4 kg)   LMP 09/27/2024 (Exact Date)   SpO2 99%   BMI 37.25 kg/m²   BMI:  Body mass index is 37.25 kg/m².  General: no acute distress, well-nourished  HENT: normocephalic, atraumatic  Lung: breathing comfortably, symmetrical expansion, no wheezing  Heart: regular rate and rhythm  Abdomen: soft, non-tender, non-distended, no hernia/mass/organomegaly, incisions healed  Extremities: normal strength, normal ROM, walks without assist device  Neuro: no focal deficits, cranial nerves grossly intact  Psych: alert and oriented, normal affect  Skin: warm, dry      Assessment: 34 year old  female with chronic morbid obesity, doing well after sleeve gastrectomy.    Bariatrician -Lexi      Plan:     Cont portion controlled meals  Cont fluid and protein goals  PPI done  Exercise - continue to increase as tolerated  Follow-up as scheduled for 6 month post-op visit    Pedro Larose MD, 11/13/24, 12:23 PM

## 2024-11-18 NOTE — PROGRESS NOTES
Appointment audit/chart review phone call per protocol.   Per records, patient has appointments as follows:  Surgeon 11/13/2024   Dietician 09/10/2024  Bariatrician 12/10/2024  Patient was seen in the office today and confirmed that she has not  been hospitalized or visited any ED since surgery.

## 2025-01-13 NOTE — PATIENT INSTRUCTIONS
Congratulations on your current weight loss success! Please reach out with any questions or concerns @ [email or web address]. Thank you for including me in your weight loss journey. Here's a review of today's visit:    Goals:  Keep a food log (My Net Diary, [employer]) as an effective tool to help track food choices  Focus on healthy plate; 1/2 plate vegetables, 1/4 plate protein, 1/4 plate carbohydrates  Avoid skipping meals - eat every 3-4 hours  Start each meal with protein first  5 months S/F VSG: Aim for 70-80 grams protein/day or 20-30 grams protein/meal  Quick convenient protein options: tuna, deli meat (turkey, chicken, roast beef), pre-cooked shrimp or chicken, greek yogurt, cottage cheese, protein shakes  When choosing yogurt aim for option consisting of 10-15 grams protein and  calories/serving  High protein yogurt examples: Siggi;s, Cuban, Two Good, Oikos Triple Zero, Dannon light and fit greek, YQ by Farzadpledison, Fajack, Chobani Less Sugar  Aim for <100 grams carbohydrates/day   Aim for 64 oz of water/day  Eat SLOWLY. Meal should take 20-30 minutes to even though you are eating very small amounts   Cut each bite into dime sized pieces  Chew each bite 20-30 x before swallowing  Do not eat and drink at the same time. Do not drink for 30 minutes before or after a meal  Avoid carbonation, straws, and chewing gum  Eliminate caffeine for at least 3 months and alcohol for at least 1 year after bariatric surgery  Take bariatric vitamins as recommended  Meal plan ahead of time  Use measuring cups and/or food scale for portion control    Exercise:  Stay active - focus on realistic activities that fit into your schedule  Plan ahead of exercise - treat as an appointment  When getting started with exercise, slowly build up duration and intensity  Aim for 30 minutes or more of moderate to vigorous exercise 5 x week  Incorporate strength training 3 x week for at least 10 minutes    Behavior Modification:  Set  small goals for self and focus on realistic, attainable changes to work on long-term.

## 2025-01-13 NOTE — PROGRESS NOTES
[facility]  [address]  [facility] [address]  Dept: [phone]  Loc: [phone]    01/13/25    Bariatric Follow-up Nutrition Session  Dilcia Garcia is a 34 year old female.     Assessment   Procedure:  Vertical Sleeve Gastrectomy  Here for medical weight management: yes  Surgery Date:  8/19/24  Medical Diagnosis:  morbidly obese    Labs:  Lab Results   Component Value Date    GLU 80 08/21/2024    BUN 8 (L) 08/21/2024    BUNCREA 7.5 (L) 08/21/2024    CREATSERUM 1.07 (H) 08/21/2024    ANIONGAP 2 08/21/2024    GFRNAA 88 12/09/2021    GFRAA 101 12/09/2021    CA 9.2 08/21/2024    OSMOCALC 289 08/21/2024    ALKPHO 68 03/08/2024    AST 15 03/08/2024    ALT 10 03/08/2024    BILT 0.6 03/08/2024    TP 7.8 03/08/2024    ALB 4.4 03/08/2024    GLOBULIN 3.4 03/08/2024     08/21/2024    K 3.9 08/21/2024     08/21/2024    CO2 30.0 08/21/2024     Lab Results   Component Value Date    MG 2.0 08/21/2024      Phosphorus (mg/dL)   Date Value   09/09/2020 2.4 (L)   04/18/2018 3.1       Thyroid:    Lab Results   Component Value Date    TSH 0.958 03/08/2024    TSH 1.020 08/17/2022    TSH 0.798 12/09/2021       Iron Panel:  Lab Results   Component Value Date    IRON 60 03/08/2024    TRANSFERRIN 243 (L) 03/08/2024    TIBCP 362 03/08/2024    SAT 17 03/08/2024       CBC:  Lab Results   Component Value Date    WBC 6.8 08/21/2024    WBC 8.0 08/20/2024    WBC 4.2 08/16/2024     Lab Results   Component Value Date    HEMOGLOBIN 13.4 05/23/2024    HGB 11.3 (L) 08/21/2024    HGB 12.1 08/20/2024    HGB 13.0 08/16/2024      Lab Results   Component Value Date    .0 08/21/2024    .0 08/20/2024    .0 08/16/2024       Diabetes:    Lab Results   Component Value Date     03/08/2024    A1C 5.4 03/08/2024       Lipid Panel:  Lab Results   Component Value Date    CHOLEST 195 03/08/2024    TRIG 85 03/08/2024    HDL 51 03/08/2024     (H)  03/08/2024    VLDL 15 03/08/2024    NONHDLC 144 (H) 03/08/2024        Vitamins/Minerals:  Lab Results   Component Value Date    B12 780 03/08/2024     Lab Results   Component Value Date    VITD 23.0 (L) 03/08/2024     Lab Results   Component Value Date/Time    THIAMINE 109 08/17/2022 08:17 AM      Lab Results   Component Value Date/Time    VITB1 109.2 03/08/2024 11:35 AM     Lab Results   Component Value Date/Time    FOLIC 20.6 03/08/2024 11:35 AM        Meds:     Current Outpatient Medications:     fluconazole (DIFLUCAN) 150 MG Oral Tab, Take 1 tablet by mouth now. May repeat in 72 hrs if symptoms persist. Please call office if not resolved after 2 doses. (Patient not taking: Reported on 10/15/2024), Disp: 2 tablet, Rfl: 0    Surgery Date: 8/19/24  Surgery Weight: 262 lbs  Weight change:  60 lbs  IBW: 145 lbs  EBWL: 117 lbs  Post-Op Excess Body Weight Loss: 51% EBWL  BMI: Body mass index is 34.66 kg/m².      Social:  Household: single, 3 children (16,9,3)  Occupation: youth  (YDS) for [employer]     Number of Consults with Eunice Mcmahan RDN: 2  Previously saw Nicole Alan RDN 9 x (9/28/22-7/19/24)  Previously saw Ana De Paz RDN 1 x (3/21/23)  Previously saw Arthur Banks RDN 1 x (9/22/20)  Previously saw Angella Carrington RDN 1 x (11/6/19)  Previously saw Elizabeth Pérez RDN 3 X (2/28/18-5/2/18)     Weight Loss Medications: none  Bariatric Vitamins: Barilife one-a-day + calcium     Patient started weight loss clinic on 10/26/17 with initial weight of 241 lbs. Would like to reach a goal weight of 180 lbs.  Today's Ht 5' 4.02\" (1.626 m)   Wt 202 lb (91.6 kg)   LMP 09/27/2024 (Exact Date)   BMI 34.66 kg/m²  indicating a 39 lbs weight loss since starting the WLC.     Weight:    Wt Readings from Last 6 Encounters:   11/13/24 217 lb (98.4 kg)   10/15/24 224 lb (101.6 kg)   10/12/24 226 lb (102.5 kg)   10/02/24 225 lb (102.1 kg)   09/11/24 236 lb (107 kg)   09/10/24 236 lb (107 kg)       Diet  History:  Patient seen in clinic today for nutrition counseling 5 months s/p VSG with Dr. Larose. Heartburn is being well has stopped Protonix. The holidays were good, she tried a little of her favorites, but portion sizes were well controlled. She did try Oreo's without any GI distress. Greek yogurt did cause diarrhea r/t being lactose intolerant, she is even more sensitive now. No difficulties reported with anything else, but there are still some foods she has been too fearful to try (ice cream). Her portions at meals are 3/4 cup (she is eating off a saucer plate broken into Health Plate portions) and it takes her 30 minutes to eat a meal. Patient is taking small bites (quarter sized) and is chewing each bite until liquefied before swallowing. Patient takes small sips and is  their beverages from the meals by 30 minutes. Patient has eliminated caffeine and carbonated beverages. Patient has stopped using straws and chew gum. Discussed typical diet which consists of 3 meals and 2 snacks/day. She is not currently tracking her meals. She reports it's difficult because her portions are so small still, but she is actively reading labels and being mindful of her CHO and PRO goals. She is aiming for <100 grams CHO/day. She is currently eating meals at consistent times. She is responsible for grocery shopping and cooking. She is not eating out currently but we have a few bites of her kids nuggets. No food log provided; verbal dietary recall listed below.     Typical Diet:  Breakfast: (8:00 am) 4 eggs and 3 slices beef cardona  Snack: (throughout the day) Premier Protein protein shake  Lunch: (12:00 pm) chipotle bowl OR minestrone (broth strained out) OR CHO smart tortilla with vegetables   Snack: Fiber One bar OR pickles (skinned)  Dinner: (6:00 - 7:00 pm) Fair Life (26 g pro and 42 g pro) protein shake + 2 oz meat OR 3 chicken nugget with 2 fries  Snack: SKIPS     Beverages: water, zero sugar lemonade, S/F sweet  tea  Alcoholic Beverages: tequila 1-2 shots    Patient has made some modifications and adjustments to diet: yes, see above  Food Allergies: NKFA  Food intolerances:  Lactose intolerant  Trigger Foods: N/A  Vitamin/mineral supplements: Barilife one-a-day + calcium  Protein supplements:  Fair Life, Premier Protein, Ensure     Exercise:  She has restarted going to the gym. Treadmill and stair-master + strength training    Nutrition Diagnosis     Nutrition Diagnosis:  Obesity II     Intervention   Education:  Review of Food Intake (pt provided food log/journal - no)  Importance of keeping a food log  Discussion of food modifications to current diet (see GOALS)  Discussion of Liquid Protein diet 2 weeks before surgery  Discussion of diet progression stages 1-4 s/p surgery  Discussed ideas for breakfast. Lunch, dinner and snacks  Basic nutrition education:  Discussion of food groups and what constitutes a serving  Discussion of number of servings  in each food group to have per meal or snack  Discussion of high fiber vs refined carbs; use of low-fat protein; saturates vs unsaturated fats  Importance of eating consistently and not skipping meals  Importance of protein for satiety  Discussed the importance of meal planning  Discussion of need for exercise for weight loss (see GOALS)    Monitor/Evaluate   Goals:  Keep a food log (My Net Diary, [email or web address]) as an effective tool to help track food choices  Focus on healthy plate; 1/2 plate vegetables, 1/4 plate protein, 1/4 plate carbohydrates  Avoid skipping meals - eat every 3-4 hours  Start each meal with protein first  5 months S/F VSG: Aim for 70-80 grams protein/day or 20-30 grams protein/meal  Quick convenient protein options: tuna, deli meat (turkey, chicken, roast beef), pre-cooked shrimp or chicken, greek yogurt, cottage cheese, protein shakes  When choosing yogurt aim for option consisting of 10-15 grams protein and  calories/serving  High protein yogurt  examples: Siggi;s, Yakut, Two Good, Oikos Triple Zero, Dannon light and fit greek, YQ by Glenn Vargas Chobani Less Sugar  Aim for <100 grams carbohydrates/day   Aim for 64 oz of water/day  Eat SLOWLY. Meal should take 20-30 minutes to even though you are eating very small amounts   Cut each bite into dime sized pieces  Chew each bite 20-30 x before swallowing  Do not eat and drink at the same time. Do not drink for 30 minutes before or after a meal  Avoid carbonation, straws, and chewing gum  Eliminate caffeine for at least 3 months and alcohol for at least 1 year after bariatric surgery  Take bariatric vitamins as recommended  Meal plan ahead of time  Use measuring cups and/or food scale for portion control    Exercise:  Stay active - focus on realistic activities that fit into your schedule  Plan ahead of exercise - treat as an appointment  When getting started with exercise, slowly build up duration and intensity  Aim for 30 minutes or more of moderate to vigorous exercise 5 x week  Incorporate strength training 3 x week for at least 10 minutes    Behavior Modification:  Set small goals for self and focus on realistic, attainable changes to work on long-term.    Visit Length: 3:22 pm to 4:20 pm - 58 minutes

## 2025-02-17 NOTE — ED INITIAL ASSESSMENT (HPI)
Left wrist pain sustained when her wrist got caught against elevator door yesterday, + swelling, cms intact

## 2025-02-17 NOTE — ED PROVIDER NOTES
Patient Seen in: [facility]      History     Chief Complaint   Patient presents with    Arm or Hand Injury     Stated Complaint: wrist injury    Subjective:   HPI    35-year-old female presents with left wrist injury.  She states she got it caught in an elevator door yesterday.      Objective:     Past Medical History:    Abnormal Pap smear of cervix    HPV+. Colpo-2013.    Back problem    Chlamydia    COVID-19 affecting pregnancy in second trimester ([facility])    Positive test 2020. Did not take Lovenox.     Decorative tattoo    Genital herpes simplex    Gestational diabetes mellitus (GDM) requiring insulin ([facility])    Insulin    Gonorrhea    treated    Heart murmur    Had a heart murmur when she was born until 10 years.  Does not take any medicine.    History of chicken pox    History of  delivery    26-28 wk. Delivered for severe preeclampsia, IUGR.     History of prior pregnancy with IUGR     Hx of motion sickness    Hypertension    Had pre clampsia during pregnancy and pt was induced to have her baby.     Low back pain during pregnancy in first trimester ([facility])    Hospitalized with severe low back pain in 1st trimester of pregnancy. Could not walk without morphine. Physical therapy.     Migraine    Morbid obesity with BMI of 45.0-49.9, adult ([facility])    Pre-preg BMI 47.5     Obesity    DANIA on CPAP    Periodic headache syndrome, not intractable    Preeclampsia, third trimester ([facility])    BP elevated at 28+ wk. Elevated urine protein 814 mg at 29w0d (3/19/2021). (3/18/2021) NEGATIVE lupus anticoagulant, beta 2 glycoprotein Ab, anticardiolipin.      Primary osteoarthritis of left knee    S/P laparoscopic sleeve gastrectomy    Severe preeclampsia ([facility])    Delivered at 28 wk at [place]     Sleep apnea              Past Surgical History:   Procedure Laterality Date           at 28 wk for severe pre-eclampsia - [place]       2014      2021    Repeat  LTCS at 32w0d - oligohydramnios, decreased fetal movement, preeclampsia with severe features Dr. Emi Benson, [facility].     Colposcopy, cervix w/upper adjacent vagina; w/biopsy(s), cervix  08/2013                Social History     Socioeconomic History    Marital status: Legally    Tobacco Use    Smoking status: Never    Smokeless tobacco: Never   Vaping Use    Vaping status: Never Used   Substance and Sexual Activity    Alcohol use: Not Currently     Alcohol/week: 0.0 standard drinks of alcohol     Comment: SOCIALLY    Drug use: No    Sexual activity: Not Currently     Partners: Male   Other Topics Concern    Caffeine Concern No    Exercise Yes    Special Diet No    Weight Concern Yes    Pt has a pacemaker No    Pt has a defibrillator No    Reaction to local anesthetic No   Social History Narrative    The patient does not use an assistive device..      The patient does  live in a home with stairs.     Social Drivers of Health     Food Insecurity: No Food Insecurity (8/19/2024)    Food Insecurity     Food Insecurity: Never true   Transportation Needs: No Transportation Needs (8/19/2024)    Transportation Needs     Lack of Transportation: No   Housing Stability: Low Risk  (8/19/2024)    Housing Stability     Housing Instability: No              Review of Systems    Positive for stated complaint: wrist injury  Other systems are as noted in HPI.  Constitutional and vital signs reviewed.      All other systems reviewed and negative except as noted above.    Physical Exam     ED Triage Vitals [02/17/25 0818]   /79   Pulse 73   Resp 16   Temp 98.6 °F (37 °C)   Temp src Oral   SpO2 100 %   O2 Device None (Room air)       Current Vitals:   Vital Signs  BP: 119/79  Pulse: 73  Resp: 16  Temp: 98.6 °F (37 °C)  Temp src: Oral    Oxygen Therapy  SpO2: 100 %  O2 Device: None (Room air)        Physical Exam  Vitals reviewed.   Constitutional:       General: She is not in acute distress.  Cardiovascular:       Rate and Rhythm: Normal rate and regular rhythm.   Pulmonary:      Effort: Pulmonary effort is normal.      Breath sounds: Normal breath sounds.   Musculoskeletal:         General: Tenderness and signs of injury present. No swelling or deformity.        Arms:       Comments: + tenderness to palpate L wrist    Neg swelling, neg deformity  Decreased ROM due to pain   Skin:     General: Skin is warm and dry.   Neurological:      General: No focal deficit present.      Mental Status: She is alert and oriented to person, place, and time.   Psychiatric:         Mood and Affect: Mood normal.         Behavior: Behavior normal.             ED Course   Labs Reviewed - No data to display                MDM              Medical Decision Making  35-year-old female presents with left wrist injury.  Differential diagnosis includes fracture versus sprain versus contusion.  Patient got her wrist caught in the elevator door yesterday.  She has been icing.  There is no swelling.  There is no deformity.  X-ray was done and shows no fracture.  Result was discussed with patient.  She was placed in a wrist splint.  She was given printed instructions for care of wrist sprain.    Amount and/or Complexity of Data Reviewed  Radiology: ordered.     Details: L wrist xray images reviewed by IC provider.  Shows no fracture    Risk  OTC drugs.        Disposition and Plan     Clinical Impression:  1. Sprain of left wrist, initial encounter         Disposition:  Discharge  2/17/2025  9:02 am    Follow-up:  Grace Montenegro MD  [address] [place] [id number] [phone]      If symptoms worsen          Medications Prescribed:  Current Discharge Medication List              Supplementary Documentation:

## 2025-02-19 NOTE — PROGRESS NOTES
[facility], [facility], [place]  [address]  [facility] [address]  Dept: [phone]    2025      Bariatric Patient Follow-up Evaluation    Chief Complaint:  Morbid obesity, preop  262, 24 sleeve    History of Present Illness:  Dilcia Garcia is a 35 year old-female presenting for surgical follow-up.  Today she weighs 189 pounds which is 28 fewer than last visit.  No issues since the last time.  She continues to work with her  2-3 times a week.    Past Medical History:    Past Medical History:    Abnormal Pap smear of cervix    HPV+. Colpo-2013.    Back problem    Chlamydia    COVID-19 affecting pregnancy in second trimester ([facility])    Positive test 2020. Did not take Lovenox.     Decorative tattoo    Genital herpes simplex    Gestational diabetes mellitus (GDM) requiring insulin ([facility])    Insulin    Gonorrhea    treated    Heart murmur    Had a heart murmur when she was born until 10 years.  Does not take any medicine.    History of chicken pox    History of  delivery    26-28 wk. Delivered for severe preeclampsia, IUGR.     History of prior pregnancy with IUGR     Hx of motion sickness    Hypertension    Had pre clampsia during pregnancy and pt was induced to have her baby.     Low back pain during pregnancy in first trimester ([facility])    Hospitalized with severe low back pain in 1st trimester of pregnancy. Could not walk without morphine. Physical therapy.     Migraine    Morbid obesity with BMI of 45.0-49.9, adult ([facility])    Pre-preg BMI 47.5     Obesity    DANIA on CPAP    Periodic headache syndrome, not intractable    Preeclampsia, third trimester ([facility])    BP elevated at 28+ wk. Elevated urine protein 814 mg at 29w0d (3/19/2021). (3/18/2021) NEGATIVE lupus anticoagulant, beta 2 glycoprotein Ab, anticardiolipin.      Primary osteoarthritis of left knee    S/P laparoscopic sleeve gastrectomy    Severe preeclampsia  (HCC)    Delivered at 28 wk at [place]     Sleep apnea       Past Surgical History:    Past Surgical History:   Procedure Laterality Date           at 28 wk for severe pre-eclampsia - [facility]       2014      2021    Repeat LTCS at 32w0d - oligohydramnios, decreased fetal movement, preeclampsia with severe features Dr. Emi Benson, [facility].     Colposcopy, cervix w/upper adjacent vagina; w/biopsy(s), cervix  2013     Childhood umbilical hernia repair, no mesh reported    Family History:  Grandparents had DM and lung cancer (smokers)    Social History:    Social History     Socioeconomic History    Marital status: Legally    Tobacco Use    Smoking status: Never    Smokeless tobacco: Never   Vaping Use    Vaping status: Never Used   Substance and Sexual Activity    Alcohol use: Not Currently     Alcohol/week: 0.0 standard drinks of alcohol     Comment: SOCIALLY    Drug use: No    Sexual activity: Not Currently     Partners: Male   Other Topics Concern    Caffeine Concern No    Exercise Yes    Special Diet No    Weight Concern Yes    Pt has a pacemaker No    Pt has a defibrillator No    Reaction to local anesthetic No       Medications:   Current Outpatient Medications:     fluconazole (DIFLUCAN) 150 MG Oral Tab, Take 1 tablet by mouth now. May repeat in 72 hrs if symptoms persist. Please call office if not resolved after 2 doses. (Patient not taking: Reported on 10/15/2024), Disp: 2 tablet, Rfl: 0     Allergies:    Allergies   Allergen Reactions    Povidone Iodine RASH       ROS:          Constitutional: negative  HEENT: negative  Respiratory: negative  Cardiovascular: negative  Gastrointestinal: negative  Genitourinary: negative  Musculoskeletal: negative  Neurological: negative  Psychological: negative  Endocrine: negative  Immunologic: negative  Integumentary: negative        Physical Exam:  Vital Signs:  LMP 02/10/2025 (Exact Date)   BMI:  There  is no height or weight on file to calculate BMI.  General: no acute distress, well-nourished  HENT: normocephalic, atraumatic  Lung: breathing comfortably, symmetrical expansion, no wheezing  Heart: regular rate and rhythm  Abdomen: soft, non-tender, non-distended, no hernia/mass/organomegaly, incisions healed, some loose skin but no rash  Extremities: normal strength, normal ROM, walks without assist device  Neuro: no focal deficits, cranial nerves grossly intact  Psych: alert and oriented, normal affect  Skin: warm, dry      Assessment: 35 year old  female with chronic morbid obesity, doing quite well after sleeve gastrectomy.    Bariatrician -Lexi      Plan:     Cont portion controlled meals  Cont fluid and protein goals  Exercise - continue to increase as tolerated  We will check 6-month labs  Follow-up as scheduled for 12 month post-op visit    Pedro Larose MD, 02/19/25, 9:24 AM

## 2025-02-21 NOTE — PROGRESS NOTES
HPI:    Patient ID: Dilcia Garcia is a 35 year old female.    HPI     Patient here in office for immediate care follow-up.  Was seen on 2/17/2025 s/p left wrist injury.  Left wrist x-ray completed during immediate care visit and was normal.  Patient discharged and advised to take over-the-counter pain medication as needed and given wrist brace.  Per patient, wrist pain resolved.  Has been using ice as needed with improvement in pain.  Was originally wearing wrist brace but stopped due to no pain.  Requesting letter to return back to work on Sunday with no restrictions.     Also complains of intermittent sore throat pain with left mouth lesion over area where wisdom tooth removed.  Reports she engages in oral sex with partner, unsure if related. Takes one dose of amoxicillin as needed when throat pain present and it resolves shortly after. Concerned due to history  of herpes.       Review of Systems   Constitutional: Negative.    HENT:  Positive for mouth sores and sore throat.    Respiratory: Negative.     Cardiovascular: Negative.    Musculoskeletal:         Left wrist pain    Skin: Negative.    Neurological: Negative.    Psychiatric/Behavioral: Negative.              No current outpatient medications on file.     Allergies:Allergies[1]   BP 93/58   Pulse 65   Temp 97.8 °F (36.6 °C) (Temporal)   Resp 16   Ht 5' 4\" (1.626 m)   Wt 189 lb (85.7 kg)   LMP 02/10/2025 (Exact Date)   SpO2 99%   BMI 32.44 kg/m²   Body mass index is 32.44 kg/m².  PHYSICAL EXAM:   Physical Exam  Vitals reviewed.   Constitutional:       General: She is not in acute distress.     Appearance: Normal appearance. She is not ill-appearing.   HENT:      Mouth/Throat:      Pharynx: Posterior oropharyngeal erythema present.      Comments: Small lesion in right lower mouth where wisdom tooth extracted   Cardiovascular:      Rate and Rhythm: Normal rate and regular rhythm.      Heart sounds: Normal heart sounds. No murmur heard.     No  friction rub. No gallop.   Pulmonary:      Effort: Pulmonary effort is normal. No respiratory distress.      Breath sounds: Normal breath sounds. No stridor. No wheezing, rhonchi or rales.   Chest:      Chest wall: No tenderness.   Musculoskeletal:         General: Swelling present. No tenderness.      Comments: Mild swelling, left base of thumb. No wrist tenderness, good ROM    Skin:     General: Skin is warm.      Findings: No rash.   Neurological:      General: No focal deficit present.      Mental Status: She is alert and oriented to person, place, and time.   Psychiatric:         Mood and Affect: Mood normal.         Behavior: Behavior normal.         Thought Content: Thought content normal.         Judgment: Judgment normal.                ASSESSMENT/PLAN:   1. Injury of left wrist, subsequent encounter  - Improved  - Wear wrist brace as needed     2. Sore throat  -Rapid strep test negative    3. Mouth lesion  - HSV 1/2 Subtype by PCR (Lesion only) [E]    4. Screen for STD (sexually transmitted disease)  - Chlamydia/Gonococcus, Pharyngeal Swab, PRIMO; Future      Orders Placed This Encounter   Procedures    HSV 1/2 Subtype by PCR (Lesion only) [E]    Chlamydia/Gonococcus, Pharyngeal Swab, PRIMO       Meds This Visit:  Requested Prescriptions      No prescriptions requested or ordered in this encounter       Imaging & Referrals:  None         ID#[id number]         [1]   Allergies  Allergen Reactions    Povidone Iodine RASH

## 2025-03-04 NOTE — PROGRESS NOTES
HPI:    Patient ID: Dilcia Garcia is a 35 year old female.    HPI     Patient here in office with complaint of continued sore throat/left lower tooth pain where wisdom tooth extracted.  Reports bumps on side of tongue.  Taking Augmentin intermittently with mild improvement.  Strep test completed at last visit and was negative.  HSV 1/2 and Chlamydia/gonorrhea pharyngeal swab also completed and was negative.  Patient states she is scheduled to see dentist today.      Requesting ENT referral for enlarged tonsils.    Review of Systems   Constitutional: Negative.    HENT:  Positive for dental problem.    Respiratory: Negative.     Cardiovascular: Negative.    Skin: Negative.    Neurological: Negative.    Psychiatric/Behavioral: Negative.              No current outpatient medications on file.     Allergies:Allergies[1]   /68   Pulse 65   Temp 98.1 °F (36.7 °C) (Temporal)   Resp 16   Ht 5' 4\" (1.626 m)   Wt 188 lb (85.3 kg)   LMP 02/10/2025 (Exact Date)   SpO2 99%   BMI 32.27 kg/m²   Body mass index is 32.27 kg/m².  PHYSICAL EXAM:   Physical Exam  Vitals reviewed.   Constitutional:       General: She is not in acute distress.     Appearance: Normal appearance. She is not ill-appearing.   HENT:      Mouth/Throat:      Mouth: Mucous membranes are moist.      Pharynx: No oropharyngeal exudate or posterior oropharyngeal erythema.      Comments: Small lesion in left lower molar area where wisdom tooth extracted.  Clear papular lesions on back of tongue.  No TMJ tenderness.  Enlarged tonsils, grade 2  Cardiovascular:      Rate and Rhythm: Normal rate and regular rhythm.      Heart sounds: Normal heart sounds. No murmur heard.     No friction rub. No gallop.   Pulmonary:      Effort: Pulmonary effort is normal. No respiratory distress.      Breath sounds: Normal breath sounds. No stridor. No wheezing, rhonchi or rales.   Chest:      Chest wall: No tenderness.   Skin:     General: Skin is warm.      Findings: No  rash.   Neurological:      General: No focal deficit present.      Mental Status: She is alert and oriented to person, place, and time.   Psychiatric:         Mood and Affect: Mood normal.         Behavior: Behavior normal.         Thought Content: Thought content normal.         Judgment: Judgment normal.                ASSESSMENT/PLAN:   1. Enlarged tonsils  - ENT Referral - In Network    2. Sore throat  -Avoid spicy/acidic or crunchy food    3. Mouth lesion  -Take Augmentin 1 tablet twice daily, has prescription for 6 days, already took 1 day of medication  - Follow-up with dentist      No orders of the defined types were placed in this encounter.      Meds This Visit:  Requested Prescriptions      No prescriptions requested or ordered in this encounter       Imaging & Referrals:  ENT - INTERNAL         ID#[id number]         [1]   Allergies  Allergen Reactions    Povidone Iodine RASH
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
## 2025-04-09 NOTE — DISCHARGE INSTRUCTIONS
Currently, no signs of injury, but if symptoms return, I recommend immediate reassessment by your primary care physician.

## 2025-04-16 NOTE — ED INITIAL ASSESSMENT (HPI)
Pt states was at work today and states slipped on some water and states twisted her left ankle and then tired to catch her self with her left hand causing some left wrist pain.

## 2025-04-16 NOTE — ED PROVIDER NOTES
Patient Seen in: [facility]      History     Chief Complaint   Patient presents with    Sprain, Minor     Injury - Entered by patient     Stated Complaint: Sprain, Minor - Injury    Subjective:   HPI    Patient is a 35-year-old female, presenting to immediate care for evaluation of acute traumatic left wrist and ankle injury that occurred today at work.  Patient slipped on some water and twisted her left ankle.  She tried to catch her fall and caught herself onto the sink with left hand/wrist.  Associated pulling injury.  Since has been experiencing left wrist and ankle pain.  Tender to palpation and with range of motion.  Relief with rest.  Has not taken thing for pain.  No numbness weakness paresthesias.  No head injury or LOC.  She is coming to immediate care for initial evaluation.  Does have prior history of left wrist injury with wrist sprain.  Does have home wrist brace for immobilization/comfort.  No other injuries.  Work-related injury.      History of Present Illness               Objective:     Past Medical History:    Abnormal Pap smear of cervix    HPV+. Colpo-2013.    Back problem    Chlamydia    COVID-19 affecting pregnancy in second trimester ([facility])    Positive test 2020. Did not take Lovenox.     Decorative tattoo    Genital herpes simplex    Gestational diabetes mellitus (GDM) requiring insulin ([facility])    Insulin    Gonorrhea    treated    Heart murmur    Had a heart murmur when she was born until 10 years.  Does not take any medicine.    History of chicken pox    History of  delivery    26-28 wk. Delivered for severe preeclampsia, IUGR.     History of prior pregnancy with IUGR     Hx of motion sickness    Hypertension    Had pre clampsia during pregnancy and pt was induced to have her baby.     Low back pain during pregnancy in first trimester ([facility])    Hospitalized with severe low back pain in 1st trimester of pregnancy. Could not walk without morphine. Physical  therapy.     Migraine    Morbid obesity with BMI of 45.0-49.9, adult (HCC)    Pre-preg BMI 47.5     Obesity    DANIA on CPAP    Periodic headache syndrome, not intractable    Preeclampsia, third trimester ([facility])    BP elevated at 28+ wk. Elevated urine protein 814 mg at 29w0d (3/19/2021). (3/18/2021) NEGATIVE lupus anticoagulant, beta 2 glycoprotein Ab, anticardiolipin.      Primary osteoarthritis of left knee    S/P laparoscopic sleeve gastrectomy    Severe preeclampsia (HCC)    Delivered at 28 wk at [place]     Sleep [facility]              Past Surgical History:   Procedure Laterality Date           at 28 wk for severe pre-eclampsia - [place]       2014      2021    Repeat LTCS at 32w0d - oligohydramnios, decreased fetal movement, preeclampsia with severe features Dr. Emi Benson, [facility].     Colposcopy, cervix w/upper adjacent vagina; w/biopsy(s), cervix  2013                Social History     Socioeconomic History    Marital status: Legally    Tobacco Use    Smoking status: Never    Smokeless tobacco: Never   Vaping Use    Vaping status: Never Used   Substance and Sexual Activity    Alcohol use: Not Currently     Alcohol/week: 0.0 standard drinks of alcohol     Comment: SOCIALLY    Drug use: No    Sexual activity: Not Currently     Partners: Male   Other Topics Concern    Caffeine Concern No    Exercise Yes    Special Diet No    Weight Concern Yes    Pt has a pacemaker No    Pt has a defibrillator No    Reaction to local anesthetic No   Social History Narrative    The patient does not use an assistive device..      The patient does  live in a home with stairs.     Social Drivers of Health     Food Insecurity: No Food Insecurity (2024)    Food Insecurity     Food Insecurity: Never true   Transportation Needs: No Transportation Needs (2024)    Transportation Needs     Lack of Transportation: No   Housing Stability: Low Risk  (2024)     Housing Stability     Housing Instability: No              Review of Systems   Constitutional:  Positive for activity change.   Musculoskeletal:  Positive for gait problem.        Left wrist pain, left ankle pain   Skin:  Negative for wound.   Neurological:  Negative for weakness and numbness.   Psychiatric/Behavioral:  Negative for confusion.    All other systems reviewed and are negative.      Positive for stated complaint: Sprain, Minor - Injury  Other systems are as noted in HPI.  Constitutional and vital signs reviewed.      All other systems reviewed and negative except as noted above.                  Physical Exam     ED Triage Vitals [04/16/25 1321]   BP (!) 144/92   Pulse 60   Resp 18   Temp 98.3 °F (36.8 °C)   Temp src    SpO2 100 %   O2 Device None (Room air)       Current Vitals:   Vital Signs  BP: (!) 144/92  Pulse: 60  Resp: 18  Temp: 98.3 °F (36.8 °C)    Oxygen Therapy  SpO2: 100 %  O2 Device: None (Room air)        Physical Exam  Vitals and nursing note reviewed.   Constitutional:       General: She is not in acute distress.     Appearance: Normal appearance. She is not ill-appearing, toxic-appearing or diaphoretic.   HENT:      Head: Normocephalic and atraumatic.      Mouth/Throat:      Mouth: Mucous membranes are moist.   Eyes:      Conjunctiva/sclera: Conjunctivae normal.   Cardiovascular:      Pulses: Normal pulses.   Pulmonary:      Effort: No respiratory distress.   Musculoskeletal:         General: Swelling, tenderness and signs of injury present. No deformity. Normal range of motion.      Comments: Tenderness to palpation left lateral malleolus and ATFL region.  Soft tissue swelling.  No focal tenderness to midfoot.  Achilles tendon intact.  Normal range of motion.  Compartments soft.  Pulse intact.    Generalized tenderness to palpation left wrist.  No obvious deformity or swelling.  Normal range of motion.  Median, radial ulnar motor sensor intact.  Pulse intact.  Compartments soft.  No  ecchymosis.  No hematoma.  No rash.   Neurological:      General: No focal deficit present.      Mental Status: She is alert and oriented to person, place, and time.      Sensory: No sensory deficit.      Motor: No weakness.   Psychiatric:         Mood and Affect: Mood normal.         Behavior: Behavior normal.         Physical Exam                ED Course   Labs Reviewed - No data to display       Results        XR ANKLE (MIN 3 VIEWS), LEFT (CPT=73610)   Final Result   PROCEDURE: XR ANKLE (MIN 3 VIEWS), LEFT (CPT=73610)       COMPARISON: None.       INDICATIONS: Pain and swelling to both malleoli of left ankle. Slip and    fall injury today.       TECHNIQUE: 3 views were obtained.         FINDINGS:    There is moderate soft tissue edema around the ankle.  There is no    underlying fracture/dislocation.  The findings are most compatible with an    ankle sprain.                           =====   CONCLUSION: Soft tissue edema around the ankle with no underlying    fracture/dislocation.  The findings are most compatible with an ankle    sprain.               Dictated by (CST): Jsoh Francisco MD on 4/16/2025 at 2:04 PM        Finalized by (CST): Josh Francisco MD on 4/16/2025 at 2:07 PM               XR WRIST COMPLETE (MIN 3 VIEWS), LEFT (CPT=73110)   Final Result   PROCEDURE: XR WRIST COMPLETE (MIN 3 VIEWS), LEFT (CPT=73110)       COMPARISON: [facility], XR WRIST COMPLETE    (MIN 3 VIEWS), LEFT (CPT=73110), 2/17/2025, 8:31 AM.       INDICATIONS: Pain to radial aspect of left wrist. Slip and fall injury    today.       TECHNIQUE: 3 views were obtained.         FINDINGS:    Bone mineralization is normal.       There is no acute fracture/dislocation.       There are slight to moderate degenerative changes at the radial aspect of    the wrist manifested by minimal bony hypertrophy, articular space    narrowing, and subarticular sclerosis.                   =====   CONCLUSION: Slight to  moderate degenerative changes at the radial aspect    of the wrist.  No acute or destructive bony process is seen.               Dictated by (CST): Josh Francisco MD on 4/16/2025 at 1:55 PM        Finalized by (CST): Josh Francisco MD on 4/16/2025 at 1:59 PM                                    [facility]     Dx: Left Ankle Sprain, Initial Encounter  Traumatic   Neurovascular intact  Compartment Soft  X-Ray: Negative for Fracture/Dislocation  Outpatient management  RICE Therapy  Tylenol prn pain  Ankle brace/air cast provided for comfort  Discharge Instructions - Ankle Sprain, RICE  Podiatry Referral  Return precautions     Dx: Left Wrist Sprain, Initial Encounter  Neurovascular intact  Compartment Soft  X-Ray: Negative for Fracture/Dislocation  Osteoarthritis present left wrist at distal radial aspect  Exam and history consistent with wrist sprain   Plan- Outpatient management  RICE Therapy  Tylenol prn pain  Home wrist brace for immobilization/comfort  PCP follow-up  Discharge instructions on finger sprain  Return precautions         Medical Decision Making      Disposition and Plan     Clinical Impression:  1. Sprain of left wrist, initial encounter    2. Sprain of anterior talofibular ligament of left ankle, initial encounter    3. Primary osteoarthritis of left wrist         Disposition:  Discharge  4/16/2025  2:18 pm    Follow-up:  Rey Barreto MD  [address] [place] [address] [id number] [phone]      Hand Orthopedic Specialist    Allyssa Martin, DPKEVIN  [address] [address]  [phone]      Podiatry (Foot-Ankle Doctor), IF needed          Medications Prescribed:  There are no discharge medications for this patient.      Supplementary Documentation:

## 2025-04-23 NOTE — PROGRESS NOTES
HPI:    Patient ID: Dilcia Garcia is a 35 year old female.    HPI     Patient here in office for immediate care follow-up.  Was seen on 416/25 for left wrist/ankle injury, fell at work.  Left wrist x-ray completed and showed no acute findings.  Left ankle x-ray also completed and showed no underlying fracture/dislocation, findings compatible with ankle sprain.  Discharged and advised to take acetaminophen as needed for pain.  Given home wrist brace for immobilization.  Per patient, left wrist/ankle pain has resolved, rated 0/10.  Requesting letter to return to work.        Review of Systems   Constitutional: Negative.    Respiratory: Negative.     Cardiovascular: Negative.    Musculoskeletal:         Left wrist/ankle pain    Neurological: Negative.    Psychiatric/Behavioral: Negative.            Current Medications[1]  Allergies:Allergies[2]   /63   Pulse 66   Temp 97.7 °F (36.5 °C) (Temporal)   Resp 20   Ht 5' 4\" (1.626 m)   Wt 189 lb (85.7 kg)   LMP 04/09/2025 (Exact Date)   SpO2 97%   BMI 32.44 kg/m²   Body mass index is 32.44 kg/m².  PHYSICAL EXAM:   Physical Exam  Vitals reviewed.   Constitutional:       General: She is not in acute distress.     Appearance: Normal appearance. She is not ill-appearing.   Cardiovascular:      Rate and Rhythm: Normal rate and regular rhythm.      Heart sounds: Normal heart sounds. No murmur heard.     No friction rub. No gallop.   Pulmonary:      Effort: Pulmonary effort is normal. No respiratory distress.      Breath sounds: Normal breath sounds. No stridor. No wheezing, rhonchi or rales.   Chest:      Chest wall: No tenderness.   Musculoskeletal:         General: No swelling or tenderness.      Comments: No left wrist/ankle tenderness, good ROM   Skin:     General: Skin is warm.      Findings: No rash.   Neurological:      General: No focal deficit present.      Mental Status: She is alert and oriented to person, place, and time.   Psychiatric:         Mood and  Affect: Mood normal.         Behavior: Behavior normal.         Thought Content: Thought content normal.         Judgment: Judgment normal.                ASSESSMENT/PLAN:   1. Sprain of left ankle, unspecified ligament, initial encounter  - Resolved  -Work letter given    2. Injury of left wrist, initial encounter  -See above      No orders of the defined types were placed in this encounter.      Meds This Visit:  Requested Prescriptions      No prescriptions requested or ordered in this encounter       Imaging & Referrals:  None         ID#[id number]         [1]   No current outpatient medications on file.   [2]   Allergies  Allergen Reactions    Povidone Iodine RASH
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
## 2025-06-02 NOTE — PROGRESS NOTES
Gynecology Office Visit      Dilcia Garcia is a 35 year old female  Patient's last menstrual period was 2025 (exact date). (contraception:  none)     HPI:     Chief Complaint   Patient presents with    Vaginal Problem     May have a yeast infection and has a bump and was previously told it was an ingrown so would like to get it looked at.       Dilcia presents for evaluation of vaginal irritation for the past few days. Reports she thought she might have a yeast infection so she used OTC monistat 1-day with little relief. Denies burning or itching, states it just \"feels irritated.\" Denies abnormal discharge but isn't sure as she is lightly spotting. Reports that after her lap gastric sleeve surgery, she has been trying new products such as laundry detergent and silk underwear.    Also reports a hx of a \"bump\" on her labia that has been there for >1 year. Was told in the past it was a ingrown hair but it has not gone away. Denies redness, warmth, pain, or discharge from the area. Just wants it looked at to make sure it is normal.     Chart and previous encounters reviewed.  HISTORY:  Past Medical History[1]   Past Surgical History[2]   Family History[3]   Social History: Short Social Hx on File[4]     Medications (Active prior to today's visit):  Current Medications[5]    Allergies:  Allergies[6]    Gyn:  Menarche: 10  Period Cycle (Days): 28  Period Duration (Days): 5  Period Flow: moderate  Use of Birth Control (if yes, specify type): None  Hx Prior Abnormal Pap: No  Pap Date: 24  Pap Result Notes: wnl  Follow Up Recommendation:     OB Hx:  OB History    Para Term  AB Living   5 3 1 2 2 3   SAB IAB Ectopic Multiple Live Births       3      # Outcome Date GA Lbr Ran/2nd Weight Sex Type Anes PTL Lv   5  21 32w0d  3 lb 4.2 oz (1.48 kg) M Caesarean Spinal N GHAZAL      Complications: Preeclampsia, Oligohydramnios   4 Term 14   6 lb 14 oz (3.118 kg) M CS-LTranv    GHAZAL      Complications: Previous  delivery, delivered, with or without mention of antepartum condition   3  07 28w0d  15 oz (0.425 kg) F CS-Unspec   GHAZAL      Birth Comments: Severe preeclampsia      Complications: Preeclampsia, Pre-eclampsia (HCC)   2 AB 06           1 AB 2006 7w0d   U          Obstetric Comments    - female \"Myrna\" -  a ? 26-28 wk - severe preeclampsia.  Baby was only 15 oz and was in the NICU for ~ 6 weeks. Myrna has no sequelae of prematurity.  Regardless of GA, this was IUGR. The patient has attempted termination at \"5 months\" but went back the following day to have the laminaria removed.  She feels is starting of a termination process is what caused the preeclampsia.    - male \"Pepito\" - Term repeat . Not on aspirin. FOB is the same as  pregnancy, but different from  pregnancy.     - male \"[place]ton\" - Morbid obesity, GDM insulin at 28+ wk. Pre-eclampsia with severe BP dx at 29 wk. (3/18/2021) NEGATIVE lupus anticoagulant, beta 2 glycoprotein Ab, anticardiolipin. Oligohydramnios and decreased fetal movement.            ROS:     10 point ROS completed and was negative, except for pertinent positive and negatives stated in the HPI.    PHYSICAL EXAM:   /72   Wt 191 lb (86.6 kg)   LMP 2025 (Exact Date)   BMI 32.79 kg/m²      Wt Readings from Last 6 Encounters:   25 191 lb (86.6 kg)   25 185 lb (83.9 kg)   25 189 lb (85.7 kg)   25 188 lb (85.3 kg)   25 189 lb (85.7 kg)   25 189 lb (85.7 kg)        Gen:  Oriented, in no acute distress  Abdomen:  scaphoid, benign without peritoneal signs, rebound tenderness,   guarding, or masses    Pelvic:      External Genitalia: +0.5 cm subdermal firm cyst on the right labia - no redness, warmth or discharge  Vagina: normal pink mucosa, no lesions, +thin discharge mixed with menstrual blood (at end of cycle)  no anterior or posterior  hernias.  Cervix: multiparous, no lesions , No CMT   Uterus: AVAF, mobile, non tender, normal size  Adnexa: non tender, no masses, normal size  Rectal: deferred     Results  Wet [place] [[id number]] (Order [id number])  Result Information    Status: Final result (Collected: 6/2/2025  1:36 PM) Provider Status: Open     [facility] [place] [[id number]]  Order: [id number]   Collected 6/2/2025  1:36 PM       Status: Final result       Next appt: 06/04/2025 at 10:15 AM in Orthopaedics (Angelo Hernandez MD)       Dx: Vulvar irritation    Test Result Released: Yes (not seen)    0 Result Notes      Component  Ref Range & Units (hover) 6/2/25  1:36 PM   PH, Vaginal 5.0   WBC few   CLUE CELL EXAM present   Moving Elements no   Whiff Test positive   Buds no   Hyphae no             Specimen Collected: 06/02/25  1:36 PM Last Resulted: 06/02/25  1:36 PM     ASSESSMENT/PLAN:     1. Vulvar irritation  - Wet [place] [[id number]]    2. Sebaceous cyst of labia    3. BV (bacterial vaginosis)  - metroNIDAZOLE 500 MG Oral Tab; Take 1 tablet (500 mg total) by mouth 2 (two) times daily for 7 days.  Dispense: 14 tablet; Refill: 0    +clue cells on wet mount, rx for metronidazole sent. Reviewed abstaining from alcohol during duration of treatment    Sebaceous cyst of labia - pt reassured that as long as the area is not painful or showing signs of infection, conservative management is reasonable    Meds This Visit:  Requested Prescriptions     Signed Prescriptions Disp Refills    metroNIDAZOLE 500 MG Oral Tab 14 tablet 0     Sig: Take 1 tablet (500 mg total) by mouth 2 (two) times daily for 7 days.       Imaging & Referrals:  None     Return if symptoms worsen or fail to improve.      Shana Gil, JADEN  6/2/2025  1:07 PM         This note was created by "[employer]" voice recognition. Errors in content may be related to improper recognition by the system; efforts to review and correct have been done but errors may still exist. Please contact me with any  questions.    Note to patient and family   The  [employer] Cures Act makes medical notes available to patients in the interest of transparency.  However, please be advised that this is a medical document.  It is intended as [id number] communication.  It is written and medical language may contain abbreviations or verbiage that are technical and unfamiliar.  It may appear blunt or direct.  Medical documents are intended to carry relevant information, facts as evident, and the clinical opinion of the practitioner.           [1]   Past Medical History:   Abnormal Pap smear of cervix    HPV+. Colpo-2013.    Back problem    Chlamydia    COVID-19 affecting pregnancy in second trimester ([facility])    Positive test 2020. Did not take Lovenox.     Decorative tattoo    Genital herpes simplex    Gestational diabetes mellitus (GDM) requiring insulin ([facility])    Insulin    Gonorrhea    treated    Heart murmur    Had a heart murmur when she was born until 10 years.  Does not take any medicine.    History of chicken pox    History of  delivery    26-28 wk. Delivered for severe preeclampsia, IUGR.     History of prior pregnancy with IUGR     Hx of motion sickness    Hypertension    Had pre clampsia during pregnancy and pt was induced to have her baby.     Low back pain during pregnancy in first trimester ([facility])    Hospitalized with severe low back pain in 1st trimester of pregnancy. Could not walk without morphine. Physical therapy.     Migraine    Morbid obesity with BMI of 45.0-49.9, adult ([facility])    Pre-preg BMI 47.5     Obesity    DANIA on CPAP    Periodic headache syndrome, not intractable    Preeclampsia, third trimester ([facility])    BP elevated at 28+ wk. Elevated urine protein 814 mg at 29w0d (3/19/2021). (3/18/2021) NEGATIVE lupus anticoagulant, beta 2 glycoprotein Ab, anticardiolipin.      Primary osteoarthritis of left knee    S/P laparoscopic sleeve gastrectomy    Severe preeclampsia ([facility])    Delivered at 28 wk  at [place]     Sleep apnea   [2]   Past Surgical History:  Procedure Laterality Date           at 28 wk for severe pre-eclampsia - [place]       2014      2021    Repeat LTCS at 32w0d - oligohydramnios, decreased fetal movement, preeclampsia with severe features Dr. Emi Benson, [facility].     Colposcopy, cervix w/upper adjacent vagina; w/biopsy(s), cervix  2013   [3] History reviewed. No pertinent family history.  [4]   Social History  Socioeconomic History    Marital status: Legally    Tobacco Use    Smoking status: Never    Smokeless tobacco: Never   Vaping Use    Vaping status: Never Used   Substance and Sexual Activity    Alcohol use: Not Currently     Alcohol/week: 0.0 standard drinks of alcohol     Comment: SOCIALLY    Drug use: No    Sexual activity: Not Currently     Partners: Male   Other Topics Concern    Caffeine Concern No    Exercise Yes    Special Diet No    Weight Concern Yes    Pt has a pacemaker No    Pt has a defibrillator No    Reaction to local anesthetic No   Social History Narrative    The patient does not use an assistive device..      The patient does  live in a home with stairs.     Social Drivers of Health     Food Insecurity: No Food Insecurity (2025)    NCSS - Food Insecurity     Worried About Running Out of Food in the Last Year: No     Ran Out of Food in the Last Year: No   Transportation Needs: No Transportation Needs (2025)    NCSS - Transportation     Lack of Transportation: No   Housing Stability: Not At Risk (2025)    NCSS - Housing/Utilities     Has Housing: Yes     Worried About Losing Housing: No     Unable to Get Utilities: No   [5]   Current Outpatient Medications   Medication Sig Dispense Refill    metroNIDAZOLE 500 MG Oral Tab Take 1 tablet (500 mg total) by mouth 2 (two) times daily for 7 days. 14 tablet 0   [6]   Allergies  Allergen Reactions    Povidone Iodine RASH
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
## 2025-06-12 NOTE — TELEPHONE ENCOUNTER
Request received for 6/4/2025     Faxed notes to workers comp  at [phone]    Received fax confirmation.

## 2025-06-14 NOTE — PROGRESS NOTES
HPI:    Patient ID: Dilcia Garcia is a 35 year old female.    HPI  Pt presenting for routine physical exam and FMLA documentation. Denies any acute issues or recent illnesses. No significant chronic medical problems. Past medical/surgical history, family history, and social history were reviewed.     H/o migraines  Requesting FMLA renewal    Mood stable, denies SH/SI/HI    Review of Systems   A comprehensive 10 point review of systems was completed.  Pertinent positives and negatives noted in the the HPI.     Current Medications[1]  Allergies:Allergies[2]   Vitals:    06/14/25 1010   BP: 99/65   Pulse: 79   SpO2: 98%   Weight: 184 lb 12.8 oz (83.8 kg)   Height: 5' 4\" (1.626 m)       Body mass index is 31.72 kg/m².   PHYSICAL EXAM:   Physical Exam  Vitals reviewed.   Constitutional:       General: She is not in acute distress.     Appearance: Normal appearance. She is well-developed.   HENT:      Head: Normocephalic and atraumatic.      Right Ear: Tympanic membrane, ear canal and external ear normal.      Left Ear: Tympanic membrane, ear canal and external ear normal.   Eyes:      Conjunctiva/sclera: Conjunctivae normal.   Neck:      Thyroid: No thyroid mass or thyroid tenderness.   Cardiovascular:      Rate and Rhythm: Normal rate and regular rhythm.      Pulses: Normal pulses.      Heart sounds: Normal heart sounds, S1 normal and S2 normal. No murmur heard.  Pulmonary:      Effort: Pulmonary effort is normal. No respiratory distress.      Breath sounds: Normal breath sounds. No wheezing, rhonchi or rales.   Abdominal:      General: Bowel sounds are normal.      Palpations: Abdomen is soft.      Tenderness: There is no abdominal tenderness. There is no guarding or rebound.   Musculoskeletal:      Cervical back: Normal range of motion and neck supple. No muscular tenderness.      Right lower leg: No edema.      Left lower leg: No edema.   Lymphadenopathy:      Cervical: No cervical adenopathy.   Skin:     General:  Skin is warm and dry.      Coloration: Skin is not jaundiced.   Neurological:      General: No focal deficit present.      Mental Status: She is alert and oriented to person, place, and time. Mental status is at baseline.   Psychiatric:         Attention and Perception: Attention normal.         Mood and Affect: Mood normal.         Behavior: Behavior normal. Behavior is cooperative.         Cognition and Memory: Cognition normal.             ASSESSMENT/PLAN:   1. Well adult exam  Discussed preventative screenings  - Pap 1/2024  - recent labs reviewed  - encouraged to continue diet/exercise for overall wellness  - follow-up with eye doctor annually  - follow-up with dentist every 6 months  - return yearly for physicals  - annual flu shot  - tetanus booster every 10yrs    2. Periodic headache syndrome, not intractable  FMLA forms completed, scanned to chart    Pt verbalized understanding and agrees with plan.    No orders of the defined types were placed in this encounter.      Meds This Visit:  Requested Prescriptions      No prescriptions requested or ordered in this encounter       Imaging & Referrals:  None         ID#[id number]       [1]   Current Outpatient Medications   Medication Sig Dispense Refill    Meloxicam 15 MG Oral Tab Take 1 tablet (15 mg total) by mouth daily. 45 tablet 0   [2]   Allergies  Allergen Reactions    Povidone Iodine RASH

## 2025-07-22 NOTE — PROGRESS NOTES
Addended by: BRIANNA GIPSON on: 7/22/2025 09:26 AM     Modules accepted: [place]     Report given to [facility]

## (undated) DIAGNOSIS — E55.9 VITAMIN D DEFICIENCY: ICD-10-CM

## (undated) DEVICE — LARGE, DISPOSABLE ALEXIS O C-SECTION PROTECTOR - RETRACTOR: Brand: ALEXIS ® O C-SECTION PROTECTOR - RETRACTOR

## (undated) DEVICE — SURGICEL SNOW ABS 4X4

## (undated) NOTE — LETTER
Date: 6/25/2018    Patient Name: Ck Yanez          To Whom it may concern: The above patient was seen at the [facility] for treatment of a medical condition. This patient should be excused from attending work today.      The jo

## (undated) NOTE — LETTER
Date: 3/1/2018    Patient Name: Chela Souza          To Whom it may concern: This patient was seen in our office on 03/01/18 . Work status:  She is to remained off work pending physical therapy. She will be re-evaluated in 2 weeks.        If this

## (undated) NOTE — LETTER
Date & Time: 7/9/2022, 11:51 AM  Patient: Piper Shows  Encounter Provider(s):    JADEN Bob       To Whom It May Concern:    Krystal Saldana was seen and treated in our department on 7/9/2022. She should not return to work until 7/12/22.     If you have any questions or concerns, please do not hesitate to call.        _____________________________  Physician/APC Signature

## (undated) NOTE — LETTER
23          Shelley Rubin  :  1990      To Whom It May Concern: This patient was seen in our office on 23 . Work status: May return to work full-time with restrictions no lifting more than 15lbs and not be involved in at work physical altercations. May return to work status per above effective 2023. If this office may be of further assistance, please do not hesitate to contact us.       Sincerely,        Alysia Cardoso MD

## (undated) NOTE — LETTER
20          Shera Pallas  :  1990      To Whom It May Concern: This patient was seen in our office on 20 .       Work Duty Status:   Work Related: Yes  MMI:No  Work Status: Modified Work with Restrictions: (Note: If these restrict

## (undated) NOTE — LETTER
24          Dilcia Garcia  :  1990      To Whom It May Concern:    This patient was seen in our office on 24 .      Work Duty Status:   Work Related: Yes  MMI:Yes  Work Status: Full Duty  Disposition:    Return to Work Date: 2024          If this office may be of further assistance, please do not hesitate to contact us.      Sincerely,         NELL ORELLANA MD

## (undated) NOTE — LETTER
Date: 11/8/2017    Patient Name: Rey Rayo          To Whom it may concern: The above patient was seen at the [facility] for treatment of a medical condition. Please excuse her during flare of medical condition.  Each flare may caus

## (undated) NOTE — LETTER
Juan Dub 35   Date:   8/31/2021     Name:   Gladis Fernandez    YOB: 1990   MRN:   [id number]       WHERE IS YOUR PAIN NOW? Maurice the areas on your body where you feel the described sensations.   Use the appropriate

## (undated) NOTE — LETTER
Juan Dub 35   Date:   3/17/2021     Name:   Denice Reaves    YOB: 1990   MRN:   [id number]       WHERE IS YOUR PAIN NOW? Maurice the areas on your body where you feel the described sensations.   Use the appropriate

## (undated) NOTE — LETTER
WHERE IS YOUR PAIN NOW?  Wilfredo the areas on your body where you feel the described sensations.  Use the appropriate symbol.  Wilfredo the areas of radiation.  Include all affected areas.  Just to complete the picture, please draw in the face.     ACHE:  ^ ^ ^   NUMBNESS:  0000   PINS & NEEDLES:  = = = =                              ^ ^ ^                       0000              = = = =                                    ^ ^ ^                       0000            = = = =      BURNING:  XXXX   STABBING: ////                  XXXX                ////                         XXXX          ////     Please wilfredo the line below indicating your degree of pain right now  with 0 being no pain 10 being the worst pain possible.                                         0             1             2              3             4              5              6              7             8             9             10         Patient Signature:

## (undated) NOTE — Clinical Note
Continue care with Dr. Meghann Alonzo  Monthly growth ultrasound starting at 28 weeks  Weekly NST's at 36 weeks  Level II ultrasound at 20 weeks

## (undated) NOTE — LETTER
Juan [address]   Date:   10/2/2020     Name:   Jerrell Downing    YOB: 1990   MRN:   [id number]       WHERE IS YOUR PAIN NOW? Maurice the areas on your body where you feel the described sensations.   Use the appropriate s

## (undated) NOTE — Clinical Note
Dear Rossy Saravia,    Thank you for sending Yohan Durand to see me for physiatry consultation. I appreciate your confidence in me to care for your patients. Please feel free call me with any questions at [phone] or contact me through [facility]     Sincerely,

## (undated) NOTE — LETTER
Date & Time: 4/16/2025, 2:27 PM  Patient: Dilcia Garcia  Encounter Provider(s):    Salvatore Foote PA       To Whom It May Concern:    Dilcia Garcia was seen and treated in our department on 04/16/2025.  Please allow to work with restrictions (no heavy lifting, pushing, pulling greater than 15 pounds) medically cleared by primary doctor (schedule to see you within the next 2 weeks).    DX: Acute Left Wrist Sprain, Acute Left Ankle Sprain    If you have any questions or concerns, please do not hesitate to call.        _____________________________  Physician/APC Signature

## (undated) NOTE — LETTER
INSTRUCTIONS FOR PRE-SURGICAL   ANTIMICROBIAL BATH/SHOWER    Your doctor has recommended a pre-surgical CHG (chlorhexidine gluconate) shower/bath with Betasept (also sold as Hibiclens). It reduces bacteria that can potentially cause infection.   Betasept or contact [place]. Store between 60-80 degrees F. Fabric Warning! CHG WILL STAIN YOUR FABRICS! Use with care around shower curtains, towels washcloths rugs and clothes. Wipe surfaces immediately if accidentally splashed.       Re Pretty

## (undated) NOTE — LETTER
23          Rosio Burns  :  1990      To Whom It May Concern: This patient was seen in our office on 23 . Work status: May return to work full-time, no restrictions    May return to work status per above effective 23. If this office may be of further assistance, please do not hesitate to contact us.       Sincerely,         Khanh Costello MD

## (undated) NOTE — ED AVS SNAPSHOT
Olamide Linda   MRN: [id number]    Department:  [facility] Emergency Department   Date of Visit:  11/27/2017           Disclosure     Insurance plans vary and the physician(s) referred by the ER may not be covered by your plan.  Please contact CARE PHYSICIAN AT ONCE OR RETURN IMMEDIATELY TO THE EMERGENCY DEPARTMENT. If you have been prescribed any medication(s), please fill your prescription right away and begin taking the medication(s) as directed.   If you believe that any of the medications

## (undated) NOTE — LETTER
Juan [address]   Date:   7/30/2021     Name:   Sue Lucero    YOB: 1990   MRN:   [id number]       WHERE IS YOUR PAIN NOW? Maurice the areas on your body where you feel the described sensations.   Use the appropriate

## (undated) NOTE — LETTER
24      Dilcia Garcia  :  1990      To Whom It May Concern:    This patient was seen in our office on 24 .      Work Duty Status:   Work Related: Yes  MMI:No  Work Status: Modified Work with Restrictions: (Note: If these restrictions are unavailable, please consider the patient to be off work)   Lift/Carry: Maximum pounds = 5  Push/Pull: Maximum pounds = 5  Bend/Squat: Limit Repetitive Motion  Stand/Walk: Alternate Sit/Stand Every 30 min if needed  Safety: No Climbing / Working at [employer]  Disposition:    Return to Work Date: 2024   Estimated Full Duty Date:  10/12/2024   Restricted Until: Date of Next of Visit   Follow-Up Date: 3 week        If this office may be of further assistance, please do not hesitate to contact us.      Sincerely,        NELL ORELLANA MD

## (undated) NOTE — MR AVS SNAPSHOT
[facility] [email or web address] [place] [address] [phone]               Thank you for choosing us for your health care visit with Lanie Cohen MD.  We are glad to serve you and happy to provide you with this summary of your vi extremity edema [R60.0], Primary osteoarthritis of left knee [M17.12], Morbid obesity with BMI of 40.0-44.9, adult ([facility] [address] [E66.01, Z68.41], Encounter for therapeutic drug monitoring [Z51.81], Vitamin D deficiency [H06.7]         Folic Acid Serum [E]    Comp For medical emergencies, dial 911.           Visit [facility] online at  [email or web address]

## (undated) NOTE — Clinical Note
Dear Candace Hernandez,    I had the opportunity to see your patient Trevor Santiago recently. I am sending you this update, and I appreciate your confidence in me to care for your patients.  Please feel free call me with any questions at [phone] or contact me thr

## (undated) NOTE — LETTER
10/02/20          Lor Shannon  :  1990      To Whom It May Concern: This patient was seen in our office on 10/02/20 . Work status: May return to work full-time, no restrictions    May return to work status per above effective 10/5/20.

## (undated) NOTE — LETTER
5/10/2023              Link Abler        [address] [address]         To Whom It May Concern:    Link Arboleda was seen and evaluated in our office on 5/10/2023. Due to her current symptoms, please allow for the following work accommodations:  - lifting limited to <15lbs  - avoid repetitive bending, twisting, squatting  - avoid physical altercations as able    If you require any more information, please contact our office.         Sincerely,      Chidi Barillas MD  [address] [address] [phone]        Document electronically generated by:  Chidi Barillas MD

## (undated) NOTE — LETTER
21          Niru Johnson  :  1990      To Whom It May Concern: This patient was seen in our office on 21 .       Work Duty Status:   Work Related: Yes  MMI:No  Work Status: Full Duty unrestricted  Disposition:    Return to Work

## (undated) NOTE — Clinical Note
Patient will come Wednesday with paperwork that needs to be faxed to the surgeons office. We just need to fax it for her.

## (undated) NOTE — LETTER
AUTHORIZATION FOR SURGICAL OPERATION OR OTHER PROCEDURE    1.  I hereby authorize Dr. Tita Patino staff assigned to my case to perform the following operation and/or procedure at the [facility]:    _____________________________ Patient signature:  ___________________________________________________             Relationship to Patient:           []  Parent    Responsible person                          []  Spouse  In case of minor or                    [] Other  _____________   In

## (undated) NOTE — LETTER
Date: 12/28/2020    Patient Name: Eric Hawley          To Whom it may concern: The above patient had been at the [facility] for treatment  medical conditions.   She had been off work since 11/3/2020 for acute low back pain which has

## (undated) NOTE — LETTER
Date: 7/12/2017    Patient Name: Adilene Jung          To Whom it may concern: This letter has been written at the patient's request. The above patient was seen at the [facility] for treatment of a medical condition.     This patient shou

## (undated) NOTE — LETTER
4/23/2025          To Whom It May Concern:    Dilcia Garcia is currently under my medical care and may return to work at this time.    She may return to work on 4/24/25  Activity is restricted as follows: none.    If you require additional information please contact our office.        Sincerely,      JADEN Handley          Document generated by:  JADEN Handley

## (undated) NOTE — LETTER
Juan [address]   Date:   6/4/2021     Name:   Quan Blake    YOB: 1990   MRN:   [id number]       WHERE IS YOUR PAIN NOW? Maurice the areas on your body where you feel the described sensations.   Use the appropriate

## (undated) NOTE — LETTER
18          Mitchell Ramos  :  1990      To whom it may concern,    This patient was seen in our office on 18 . Work status:  She is to remained off work pending physical therapy.        If this office may be of further assistanc

## (undated) NOTE — LETTER
Juan [address]   Date:   9/16/2020     Name:   Pratibha Tam    YOB: 1990   MRN:   [id number]       WHERE IS YOUR PAIN NOW? Maurice the areas on your body where you feel the described sensations.   Use the appropriate s

## (undated) NOTE — Clinical Note
Continue care with Dr. Serna  Monthly growth ultrasound  Weekly NST's at 36 weeks  Low-dose aspirin, 1.5 tablets or 120 mg daily until 37 weeks

## (undated) NOTE — LETTER
21              Khanh Gonzalez  :  1990        To Whom It May Concern:     This patient was seen in our office on 21 .       Work Duty Status:   Work Related: Yes  MMI:No  Work Status: Modified Work with Restrictions: (Note: If thes

## (undated) NOTE — Clinical Note
Jennifer Villalpando,  Patient is interested in bariatric surgery. Can you please check her benefits and let her know? Thank you!

## (undated) NOTE — LETTER
:  1990      To Whom It May Concern: This patient was seen in our office on 21 . Please excuse her from work starting 3/1/21. She has been referred to physiatrist. Return to work date pending treatment.  .   If this office may be of [facility]

## (undated) NOTE — Clinical Note
Dear Dr. Dale Puckett,    I had the opportunity to see your patient Elroy Parker recently. I am sending you this update, and I appreciate your confidence in me to care for your patients.  Please feel free call me with any questions at [phone] or contact me

## (undated) NOTE — LETTER
10/26/2017    Patient: Rajni Looney  : 1990 Visit date: 10/26/2017    Dear  Dr. Tiffany Carmichael MD,    Santa Gagnon is a pleasant  32year old,Black or  , NON  OR  OR  ETHNICITY female, who was referred to us for weight april

## (undated) NOTE — LETTER
21          Mary Thomas  :  1990      To Whom It May Concern: This patient was seen in our office on 21 .       Work Duty Status:   Work Related: Yes  MMI:No  Work Status: Modified Work with Restrictions: (Note: If these restri

## (undated) NOTE — LETTER
9/7/2022              [address] [place] [id number]         To Whom It May Concern,    Yemi Lnadis was given intermittent FLMA by Dr. Josiah Villarreal for migraine headaches. Aysha Hamilton was given 5 episodes per month (2 days per episode). This means that if  Aysha Hamilton takes off during the week for migraine headaches, she should have two days to recover (this is a total of one episode). To clarify, 5 episodes per month is equal to 10 days off per month. If you have any further questions please feel free to contact our office.           Sincerely,          JADEN Villarreal for Dr. Pola Mcclellan MD   [address] [place], [place] [id number] [phone]        Document electronically generated by:  JADEN Villarreal

## (undated) NOTE — LETTER
The [facility]  Scheduling the Birth of Your Jaimie Collazo    You are scheduled for a  delivery on Friday,21 at 9:30am with [address].   You should arrive at the [facility] at 7:30am.      Please follow the enclosed antimicro temperature, blood pressure, and pulse and place you on the fetal monitor to monitor the baby's well being and any uterine activity you may be experiencing prior to your delivery.   Your nurse will also ask you some questions, start an intravenous (IV) line

## (undated) NOTE — LETTER
Date & Time: 5/20/2025, 9:58 PM  Patient: Dilcia Garcia  Encounter Provider(s):    Angella Salgado MD       To Whom It May Concern:    Dilcia Garcia was seen and treated in our department on 5/20/2025. She can return to work with these limitations: No lifting with right arm until seen by orthopedic.    If you have any questions or concerns, please do not hesitate to call.        _____________________________  Physician/APC Signature

## (undated) NOTE — LETTER
21          Fadumo Steen  :  1990      To Whom It May Concern: This patient was seen in our office on 21 .       Work Duty Status:   Work Related: Yes  MMI:No  Work Status: Modified Work with Restrictions: (Note: If these restri

## (undated) NOTE — LETTER
Date: 6/25/2018    Patient Name: Ramy Garcia          To Whom it may concern: The above patient was seen at the [facility] for treatment of a medical condition. Please excused her from mandated overtime due to flare ups.  Thank you fo

## (undated) NOTE — LETTER
21          Sadie Perkins  :  1990      To Whom It May Concern: This patient was seen in our office on 21 .       Work Duty Status:   Work Related: Yes  MMI:No  Work Status: Modified Work with Restrictions: (Note: If these restri

## (undated) NOTE — LETTER
Date & Time: 4/9/2025, 2:34 PM  Patient: Dilcia Garcia  Encounter Provider(s):    Gayle Landon DO       To Whom It May Concern:    Dilcia Garcia was seen and treated in our department on 4/9/2025. She can return to work today with no restrictions.      ____Gayle Landon DO_________________________  Physician/APC Signature

## (undated) NOTE — LETTER
[facility] [place] [place]   Date:   7/25/2023     Name:   Abelino Moore    YOB: 1990   MRN:   [id number]       [facility]? Wilfredo the areas on your body where you feel the described sensations. Use the appropriate symbol. [place] the areas of radiation. Include all affected areas. Just to complete the picture, please draw in the face. ACHE:  ^ ^ ^   NUMBNESS:  0000   PINS & NEEDLES:  = = = =                              ^ ^ ^                       0000              = = = =                                    ^ ^ ^                       0000            = = = =      BURNING:  XXXX   STABBING: ////                  XXXX                ////                         XXXX          ////     Please wilfredo the line below indicating your degree of pain right now  with 0 being no pain 10 being the worst pain possible.                                          0             1             2              3             4              5              6              7             8             9             10         Patient Signature:

## (undated) NOTE — MR AVS SNAPSHOT
[address]  [address]  [phone]               Thank you for choosing us for your health care visit with Salvador Gonzalez.  MD Daphne.  We are glad to serve you and happy to provide you with this summa For medical emergencies, dial 911. Educational Information     Healthy Diet and Regular Exercise  [employer] for making healthy food choices  -   Enjoy your food, but eat less. Fully enjoy your food when eating.    Don’t

## (undated) NOTE — LETTER
Date: 6/4/2025    Patient Name: Dilcia Garcia          To Whom it may concern:      The patient was seen and examined for a right shoulder injury. She is cleared for full duty without restrictions.     Please contact us with any questions        Sincerely,    Angelo Hernandez MD

## (undated) NOTE — Clinical Note
Wants to restart surgical weight loss.  States she left some messages to schedule with Thuan  Can you please help the process Thanks

## (undated) NOTE — LETTER
[facility]   Date:   2/28/2024     Name:   Dilcia Garcia    YOB: 1990   MRN:   [id number]       WHERE IS YOUR PAIN NOW?  Wilfredo the areas on your body where you feel the described sensations.  Use the appropriate symbol.  Wilfredo the areas of radiation.  Include all affected areas.  Just to complete the picture, please draw in the face.     ACHE:  ^ ^ ^   NUMBNESS:  0000   PINS & NEEDLES:  = = = =                              ^ ^ ^                       0000              = = = =                                    ^ ^ ^                       0000            = = = =      BURNING:  XXXX   STABBING: ////                  XXXX                ////                         XXXX          ////     Please wilfredo the line below indicating your degree of pain right now  with 0 being no pain 10 being the worst pain possible.                                         0             1             2              3             4              5              6              7             8             9             10         Patient Signature:

## (undated) NOTE — LETTER
2/21/2025          To Whom It May Concern:    Dilcia Garcia is currently under my medical care and may not return to work at this time.    She may return to work on 2/24/25.  Activity is restricted as follows: none.    If you require additional information please contact our office.        Sincerely,      JADEN Handley          Document generated by:  JADEN Handley

## (undated) NOTE — LETTER
Date: 3/15/2018    Patient Name: Christianne Wade          To Whom it may concern: The above patient was seen at the [facility] for treatment of a medical condition. This patient can return to work starting 3/23/2018 with full duty. Magali Hernandez

## (undated) NOTE — LETTER
06/09/23        Sandro Segundo  [address] [address]      Dear Nate Blevins,    [facility] records indicate that you have outstanding lab work and or testing that was ordered for you and has not yet been completed:  Orders Placed This Encounter      Physiatry Referral - In Network      XR LUMBAR SPINE (MIN 4 VIEWS) (CPT=72110)      XR KNEE COMPLETE BILAT EM(CPT=73564-50)    To provide you with the best possible care, please complete these orders at your earliest convenience. If you have recently completed these orders please disregard this letter. If you have any questions please call the office at Dept: [phone].      Thank you,       Magalys Acosta MD

## (undated) NOTE — LETTER
Date: 8/16/2017    Patient Name: Shelli Ventura          To Whom it may concern: This letter has been written at the patient's request. The above patient was seen at the [facility] for treatment of a medical condition.     This letter is in

## (undated) NOTE — LETTER
[facility] [address] , [place]   Date:   5/16/2023     Name:   Corbin Khan    YOB: 1990   MRN:   [id number]       WHERE IS YOUR PAIN NOW? Wilfredo the areas on your body where you feel the described sensations. Use the appropriate symbol. Dionneice Cedar the areas of radiation. Include all affected areas. Just to complete the picture, please draw in the face. ACHE:  ^ ^ ^   NUMBNESS:  0000   PINS & NEEDLES:  = = = =                              ^ ^ ^                       0000              = = = =                                    ^ ^ ^                       0000            = = = =      BURNING:  XXXX   STABBING: ////                  XXXX                ////                         XXXX          ////     Please wilfredo the line below indicating your degree of pain right now  with 0 being no pain 10 being the worst pain possible.                                          0             1             2              3             4              5              6              7             8             9             10         Patient Signature:

## (undated) NOTE — LETTER
21          Jasmine Soledad  :  1990      To Whom It May Concern: This patient was seen in our office on 21 .       Work Duty Status:   Work Related: Yes  MMI:No  Work Status: Modified Work with Restrictions: (Note: If these restri

## (undated) NOTE — Clinical Note
IUP at 21w6d  Normal level 2 ultrasound  Class III obesity complicating pregnancy  H/o 28 wk  due to severe preeclampsia; followed by term repeat   History of severe preeclampsia  COVID infection in the second trimester     RECOMMENDATION

## (undated) NOTE — LETTER
Date & Time: 2/17/2025, 9:04 AM  Patient: Dilcia Garcia  Encounter Provider(s):    Romina Sanchez APRN       To Whom It May Concern:    Dilcia Garcia was seen and treated in our department on 2/17/2025. She can return to work with these limitations: avoid lifting or pushing over 10 lbs until she is re-evaluated and cleared by her doctor .    If you have any questions or concerns, please do not hesitate to call.    Romina Sanchez NP    _____________________________  Physician/APC Signature

## (undated) NOTE — LETTER
Patient Name: Terri Hong  : 1990  MRN: [id number]  Patient Address: [address] [place] [address]      Coronavirus Disease 2019 (COVID-19)     [facility] is committed to the safety and well-being of our patients, member carefully. If your symptoms get worse, call your healthcare provider immediately. 3. Get rest and stay hydrated.    4. If you have a medical appointment, call the healthcare provider ahead of time and tell them that you have or may have COVID-19.  5. For m of fever-reducing medications; and  · Improvement in respiratory symptoms (e.g., cough, shortness of breath); and  · At least 10 days have passed since symptoms first appeared OR if asymptomatic patient or date of symptom onset is unclear then use 10 days donors must:    · Have had a confirmed diagnosis of COVID-19  · Be symptom-free for at least 14 days*    *Some people will be required to have a repeat COVID-19 test in order to be eligible to donate.  If you’re instructed by Darleen that a repeat test is r

## (undated) NOTE — LETTER
Date & Time: 5/15/2024, 11:44 AM  Patient: Dilcia Garcia  Encounter Provider(s):    Chen Melvin APRN       To Whom It May Concern:    Dilcia Garcia was seen and treated in our department on 5/15/2024. She should not return to work until 5/17/24 .    If you have any questions or concerns, please do not hesitate to call.        _____________________________  Physician/APC Signature

## (undated) NOTE — LETTER
AUTHORIZATION FOR SURGICAL OPERATION OR OTHER PROCEDURE    1. I hereby authorize Dr. Karo Fermin and the [facility] staff assigned to my case to perform the following operation and/or procedure at the [facility]:    Left wrist tendon sheath injection    2.   My p []  Parent    Responsible person                          []  Spouse  In case of minor or                    [] Other  _____________   Incompetent name:  __________________________________________________                               (please print)      _

## (undated) NOTE — LETTER
Date & Time: 1/27/2020, 7:35 PM  Patient: Mariano Murillo  Encounter Provider(s):    Cindi Moyer MD       To Whom It May Concern:    Mariano Murillo was seen and treated in our department on 1/27/2020.  Advised minimal ambulation until seen by PCP in

## (undated) NOTE — LETTER
[address]   Date:   7/30/2021     Name:   Elroy Parker    YOB: 1990   MRN:   [id number]       WHERE IS YOUR PAIN NOW? Maurice the areas on your body where you feel the described sensations.   Use the appropriate

## (undated) NOTE — Clinical Note
Wendi Callejas completed the six month pre surgical program. Can you please put in a referral to Dr María Acuña (patient preference) She will also need referrals to a cardiologist and pulmonologist for pre surgical clearance.  She has an appointment with you tomorro